# Patient Record
Sex: MALE | Race: WHITE | NOT HISPANIC OR LATINO | Employment: OTHER | URBAN - METROPOLITAN AREA
[De-identification: names, ages, dates, MRNs, and addresses within clinical notes are randomized per-mention and may not be internally consistent; named-entity substitution may affect disease eponyms.]

---

## 2017-01-01 ENCOUNTER — APPOINTMENT (OUTPATIENT)
Dept: NUCLEAR MEDICINE | Facility: HOSPITAL | Age: 77
End: 2017-01-01

## 2017-01-01 ENCOUNTER — HOSPITAL ENCOUNTER (INPATIENT)
Facility: HOSPITAL | Age: 77
LOS: 6 days | End: 2017-07-28
Attending: INTERNAL MEDICINE | Admitting: INTERNAL MEDICINE

## 2017-01-01 ENCOUNTER — HOSPITAL ENCOUNTER (OUTPATIENT)
Dept: INFUSION THERAPY | Facility: HOSPITAL | Age: 77
Discharge: HOME | End: 2017-03-14
Attending: INTERNAL MEDICINE | Admitting: PHYSICAL MEDICINE & REHABILITATION

## 2017-01-01 ENCOUNTER — INFUSION (OUTPATIENT)
Dept: ONCOLOGY | Facility: HOSPITAL | Age: 77
End: 2017-01-01

## 2017-01-01 ENCOUNTER — APPOINTMENT (OUTPATIENT)
Dept: CT IMAGING | Facility: HOSPITAL | Age: 77
End: 2017-01-01

## 2017-01-01 ENCOUNTER — ANESTHESIA EVENT (OUTPATIENT)
Dept: GASTROENTEROLOGY | Facility: HOSPITAL | Age: 77
End: 2017-01-01

## 2017-01-01 ENCOUNTER — TRANSCRIBE ORDERS (OUTPATIENT)
Dept: ADMINISTRATIVE | Facility: HOSPITAL | Age: 77
End: 2017-01-01

## 2017-01-01 ENCOUNTER — APPOINTMENT (OUTPATIENT)
Dept: RESPIRATORY THERAPY | Facility: HOSPITAL | Age: 77
End: 2017-01-01
Attending: INTERNAL MEDICINE

## 2017-01-01 ENCOUNTER — APPOINTMENT (OUTPATIENT)
Dept: ONCOLOGY | Facility: HOSPITAL | Age: 77
End: 2017-01-01

## 2017-01-01 ENCOUNTER — APPOINTMENT (OUTPATIENT)
Dept: MRI IMAGING | Facility: HOSPITAL | Age: 77
End: 2017-01-01
Attending: INTERNAL MEDICINE

## 2017-01-01 ENCOUNTER — APPOINTMENT (OUTPATIENT)
Dept: ONCOLOGY | Facility: CLINIC | Age: 77
End: 2017-01-01

## 2017-01-01 ENCOUNTER — INPATIENT HOSPITAL (OUTPATIENT)
Dept: URBAN - METROPOLITAN AREA HOSPITAL 113 | Facility: HOSPITAL | Age: 77
End: 2017-01-01

## 2017-01-01 ENCOUNTER — LAB (OUTPATIENT)
Dept: LAB | Facility: HOSPITAL | Age: 77
End: 2017-01-01

## 2017-01-01 ENCOUNTER — APPOINTMENT (OUTPATIENT)
Dept: CARDIOLOGY | Facility: HOSPITAL | Age: 77
End: 2017-01-01
Attending: INTERNAL MEDICINE

## 2017-01-01 ENCOUNTER — APPOINTMENT (OUTPATIENT)
Dept: GENERAL RADIOLOGY | Facility: HOSPITAL | Age: 77
End: 2017-01-01
Attending: INTERNAL MEDICINE

## 2017-01-01 ENCOUNTER — APPOINTMENT (OUTPATIENT)
Dept: NEUROLOGY | Facility: HOSPITAL | Age: 77
End: 2017-01-01
Attending: INTERNAL MEDICINE

## 2017-01-01 ENCOUNTER — TELEPHONE (OUTPATIENT)
Dept: ONCOLOGY | Facility: CLINIC | Age: 77
End: 2017-01-01

## 2017-01-01 ENCOUNTER — OFFICE VISIT (OUTPATIENT)
Dept: ONCOLOGY | Facility: CLINIC | Age: 77
End: 2017-01-01

## 2017-01-01 ENCOUNTER — APPOINTMENT (OUTPATIENT)
Dept: ULTRASOUND IMAGING | Facility: HOSPITAL | Age: 77
End: 2017-01-01

## 2017-01-01 ENCOUNTER — HOSPITAL ENCOUNTER (OUTPATIENT)
Dept: CARDIOLOGY | Facility: HOSPITAL | Age: 77
Discharge: HOME | End: 2017-04-28
Attending: INTERNAL MEDICINE | Admitting: INTERNAL MEDICINE

## 2017-01-01 ENCOUNTER — APPOINTMENT (OUTPATIENT)
Dept: LAB | Facility: HOSPITAL | Age: 77
End: 2017-01-01

## 2017-01-01 ENCOUNTER — ANESTHESIA (OUTPATIENT)
Dept: GASTROENTEROLOGY | Facility: HOSPITAL | Age: 77
End: 2017-01-01

## 2017-01-01 ENCOUNTER — HOSPITAL ENCOUNTER (INPATIENT)
Facility: HOSPITAL | Age: 77
LOS: 8 days | Discharge: NURSING, REHAB OR LONG-TERM CARE | End: 2017-07-11
Attending: EMERGENCY MEDICINE | Admitting: INTERNAL MEDICINE

## 2017-01-01 ENCOUNTER — HOSPITAL ENCOUNTER (OUTPATIENT)
Dept: PET IMAGING | Facility: HOSPITAL | Age: 77
Discharge: HOME | End: 2017-03-29
Attending: INTERNAL MEDICINE | Admitting: INTERNAL MEDICINE

## 2017-01-01 VITALS
RESPIRATION RATE: 17 BRPM | HEIGHT: 69 IN | TEMPERATURE: 97.5 F | DIASTOLIC BLOOD PRESSURE: 62 MMHG | OXYGEN SATURATION: 93 % | BODY MASS INDEX: 23.52 KG/M2 | HEART RATE: 88 BPM | SYSTOLIC BLOOD PRESSURE: 112 MMHG | WEIGHT: 158.8 LBS

## 2017-01-01 VITALS
DIASTOLIC BLOOD PRESSURE: 41 MMHG | OXYGEN SATURATION: 93 % | HEIGHT: 68 IN | BODY MASS INDEX: 24.32 KG/M2 | WEIGHT: 160.5 LBS | SYSTOLIC BLOOD PRESSURE: 68 MMHG | TEMPERATURE: 97 F

## 2017-01-01 VITALS
BODY MASS INDEX: 25.33 KG/M2 | HEART RATE: 73 BPM | HEIGHT: 69 IN | OXYGEN SATURATION: 95 % | DIASTOLIC BLOOD PRESSURE: 68 MMHG | WEIGHT: 171 LBS | RESPIRATION RATE: 12 BRPM | TEMPERATURE: 98.2 F | SYSTOLIC BLOOD PRESSURE: 128 MMHG

## 2017-01-01 VITALS — WEIGHT: 163.6 LBS | BODY MASS INDEX: 24.16 KG/M2

## 2017-01-01 VITALS — SYSTOLIC BLOOD PRESSURE: 137 MMHG | HEART RATE: 60 BPM | DIASTOLIC BLOOD PRESSURE: 72 MMHG

## 2017-01-01 VITALS
BODY MASS INDEX: 24.5 KG/M2 | RESPIRATION RATE: 14 BRPM | OXYGEN SATURATION: 95 % | HEART RATE: 58 BPM | TEMPERATURE: 98 F | SYSTOLIC BLOOD PRESSURE: 112 MMHG | HEIGHT: 69 IN | WEIGHT: 165.4 LBS | DIASTOLIC BLOOD PRESSURE: 62 MMHG

## 2017-01-01 VITALS
TEMPERATURE: 97.6 F | OXYGEN SATURATION: 96 % | DIASTOLIC BLOOD PRESSURE: 76 MMHG | HEART RATE: 76 BPM | RESPIRATION RATE: 18 BRPM | HEIGHT: 69 IN | WEIGHT: 165.6 LBS | SYSTOLIC BLOOD PRESSURE: 144 MMHG | BODY MASS INDEX: 24.53 KG/M2

## 2017-01-01 VITALS
HEIGHT: 69 IN | DIASTOLIC BLOOD PRESSURE: 72 MMHG | OXYGEN SATURATION: 98 % | SYSTOLIC BLOOD PRESSURE: 110 MMHG | RESPIRATION RATE: 16 BRPM | WEIGHT: 168.8 LBS | HEART RATE: 62 BPM | BODY MASS INDEX: 25 KG/M2 | TEMPERATURE: 98 F

## 2017-01-01 VITALS — DIASTOLIC BLOOD PRESSURE: 84 MMHG | SYSTOLIC BLOOD PRESSURE: 168 MMHG

## 2017-01-01 DIAGNOSIS — D50.9 MICROCYTIC HYPOCHROMIC ANEMIA: Primary | ICD-10-CM

## 2017-01-01 DIAGNOSIS — D50.9 MICROCYTIC HYPOCHROMIC ANEMIA: ICD-10-CM

## 2017-01-01 DIAGNOSIS — D51.8 OTHER VITAMIN B12 DEFICIENCY ANEMIA: ICD-10-CM

## 2017-01-01 DIAGNOSIS — I73.9 PAD (PERIPHERAL ARTERY DISEASE) (HCC): Primary | ICD-10-CM

## 2017-01-01 DIAGNOSIS — D51.9 ANEMIA DUE TO VITAMIN B12 DEFICIENCY, UNSPECIFIED B12 DEFICIENCY TYPE: ICD-10-CM

## 2017-01-01 DIAGNOSIS — K26.9 DUODENAL ULCER, UNSPECIFIED AS ACUTE OR CHRONIC, WITHOUT HEM: ICD-10-CM

## 2017-01-01 DIAGNOSIS — D51.8 OTHER VITAMIN B12 DEFICIENCY ANEMIA: Primary | ICD-10-CM

## 2017-01-01 DIAGNOSIS — R53.1 GENERAL WEAKNESS: ICD-10-CM

## 2017-01-01 DIAGNOSIS — R53.1 WEAKNESS GENERALIZED: Primary | ICD-10-CM

## 2017-01-01 DIAGNOSIS — D50.9 IRON DEFICIENCY ANEMIA, UNSPECIFIED IRON DEFICIENCY ANEMIA TYPE: Primary | ICD-10-CM

## 2017-01-01 DIAGNOSIS — D50.9 IRON DEFICIENCY ANEMIA, UNSPECIFIED IRON DEFICIENCY ANEMIA TYPE: ICD-10-CM

## 2017-01-01 DIAGNOSIS — G60.8 PERIPHERAL SENSORY NEUROPATHY: ICD-10-CM

## 2017-01-01 DIAGNOSIS — D50.9 IRON DEFICIENCY ANEMIA, UNSPECIFIED: ICD-10-CM

## 2017-01-01 DIAGNOSIS — I85.00 ESOPHAGEAL VARICES WITHOUT BLEEDING: ICD-10-CM

## 2017-01-01 DIAGNOSIS — R10.9 UNSPECIFIED ABDOMINAL PAIN: ICD-10-CM

## 2017-01-01 DIAGNOSIS — I82.90 ACUTE VENOUS THROMBOSIS: Primary | ICD-10-CM

## 2017-01-01 DIAGNOSIS — C34.90 NON-SMALL CELL LUNG CANCER, UNSPECIFIED LATERALITY (HCC): Primary | ICD-10-CM

## 2017-01-01 DIAGNOSIS — D51.0 PERNICIOUS ANEMIA: ICD-10-CM

## 2017-01-01 DIAGNOSIS — R63.4 ABNORMAL WEIGHT LOSS: ICD-10-CM

## 2017-01-01 DIAGNOSIS — I81 PORTAL VEIN THROMBOSIS: ICD-10-CM

## 2017-01-01 DIAGNOSIS — R63.0 ANOREXIA: ICD-10-CM

## 2017-01-01 DIAGNOSIS — K29.70 GASTRITIS, UNSPECIFIED, WITHOUT BLEEDING: ICD-10-CM

## 2017-01-01 DIAGNOSIS — C34.90 NON-SMALL CELL LUNG CANCER, UNSPECIFIED LATERALITY (HCC): ICD-10-CM

## 2017-01-01 DIAGNOSIS — I82.0 HEPATIC VEIN THROMBOSIS (HCC): ICD-10-CM

## 2017-01-01 DIAGNOSIS — G60.8 PERIPHERAL SENSORY NEUROPATHY: Primary | ICD-10-CM

## 2017-01-01 DIAGNOSIS — R10.84 GENERALIZED ABDOMINAL PAIN: ICD-10-CM

## 2017-01-01 DIAGNOSIS — I86.4 GASTRIC VARICES: ICD-10-CM

## 2017-01-01 DIAGNOSIS — D68.51 ACTIVATED PROTEIN C RESISTANCE: ICD-10-CM

## 2017-01-01 DIAGNOSIS — K55.9 VASCULAR DISORDER OF INTESTINE, UNSPECIFIED: ICD-10-CM

## 2017-01-01 DIAGNOSIS — I73.9 PAD (PERIPHERAL ARTERY DISEASE) (HCC): ICD-10-CM

## 2017-01-01 LAB
AFP-TM SERPL-MCNC: 1.2 NG/ML (ref 0–8.3)
ALBUMIN SERPL-MCNC: 2.2 G/DL (ref 3.5–5.2)
ALBUMIN SERPL-MCNC: 2.3 G/DL (ref 3.5–5.2)
ALBUMIN SERPL-MCNC: 2.3 G/DL (ref 3.5–5.2)
ALBUMIN SERPL-MCNC: 2.4 G/DL (ref 3.5–5.2)
ALBUMIN SERPL-MCNC: 2.5 G/DL (ref 2.9–4.4)
ALBUMIN SERPL-MCNC: 2.7 G/DL (ref 3.5–5.2)
ALBUMIN SERPL-MCNC: 2.9 G/DL (ref 3.5–5.2)
ALBUMIN SERPL-MCNC: 3 G/DL (ref 3.5–5.2)
ALBUMIN SERPL-MCNC: 3 G/DL (ref 3.5–5.2)
ALBUMIN/GLOB SERPL: 0.6 G/DL
ALBUMIN/GLOB SERPL: 0.6 G/DL
ALBUMIN/GLOB SERPL: 0.7 G/DL
ALBUMIN/GLOB SERPL: 0.8 G/DL
ALBUMIN/GLOB SERPL: 0.8 G/DL
ALBUMIN/GLOB SERPL: 0.8 {RATIO} (ref 0.7–1.7)
ALBUMIN/GLOB SERPL: 0.9 G/DL
ALBUMIN/GLOB SERPL: 0.9 G/DL
ALP SERPL-CCNC: 111 U/L (ref 39–117)
ALP SERPL-CCNC: 122 U/L (ref 39–117)
ALP SERPL-CCNC: 126 U/L (ref 39–117)
ALP SERPL-CCNC: 129 U/L (ref 39–117)
ALP SERPL-CCNC: 142 U/L (ref 39–117)
ALP SERPL-CCNC: 153 U/L (ref 39–117)
ALP SERPL-CCNC: 158 U/L (ref 39–117)
ALP SERPL-CCNC: 171 U/L (ref 39–117)
ALP SERPL-CCNC: 184 U/L (ref 39–117)
ALP SERPL-CCNC: 201 U/L (ref 39–117)
ALPHA1 GLOB FLD ELPH-MCNC: 0.4 G/DL (ref 0–0.4)
ALPHA2 GLOB SERPL ELPH-MCNC: 1 G/DL (ref 0.4–1)
ALT SERPL W P-5'-P-CCNC: 18 U/L (ref 1–41)
ALT SERPL W P-5'-P-CCNC: 19 U/L (ref 1–41)
ALT SERPL W P-5'-P-CCNC: 19 U/L (ref 1–41)
ALT SERPL W P-5'-P-CCNC: 20 U/L (ref 1–41)
ALT SERPL W P-5'-P-CCNC: 20 U/L (ref 1–41)
ALT SERPL W P-5'-P-CCNC: 24 U/L (ref 1–41)
ALT SERPL W P-5'-P-CCNC: 26 U/L (ref 1–41)
ALT SERPL W P-5'-P-CCNC: 34 U/L (ref 1–41)
ALT SERPL W P-5'-P-CCNC: 47 U/L (ref 1–41)
ALT SERPL W P-5'-P-CCNC: 60 U/L (ref 1–41)
AMMONIA BLD-SCNC: 37 UMOL/L (ref 16–60)
AMMONIA BLD-SCNC: 46 UMOL/L (ref 16–60)
ANION GAP SERPL CALCULATED.3IONS-SCNC: 10.6 MMOL/L
ANION GAP SERPL CALCULATED.3IONS-SCNC: 12.1 MMOL/L
ANION GAP SERPL CALCULATED.3IONS-SCNC: 12.3 MMOL/L
ANION GAP SERPL CALCULATED.3IONS-SCNC: 12.5 MMOL/L
ANION GAP SERPL CALCULATED.3IONS-SCNC: 12.5 MMOL/L
ANION GAP SERPL CALCULATED.3IONS-SCNC: 13 MMOL/L
ANION GAP SERPL CALCULATED.3IONS-SCNC: 13.4 MMOL/L
ANION GAP SERPL CALCULATED.3IONS-SCNC: 13.7 MMOL/L
ANION GAP SERPL CALCULATED.3IONS-SCNC: 13.9 MMOL/L
ANION GAP SERPL CALCULATED.3IONS-SCNC: 14.3 MMOL/L
ANION GAP SERPL CALCULATED.3IONS-SCNC: 14.5 MMOL/L
ANION GAP SERPL CALCULATED.3IONS-SCNC: 15 MMOL/L
ANION GAP SERPL CALCULATED.3IONS-SCNC: 16.5 MMOL/L
ANISOCYTOSIS BLD QL: NORMAL
APPEARANCE FLD: CLEAR
APPEARANCE FLD: CLEAR
APTT HEX PL PPP: 8 SEC
APTT IMM NP PPP: 28.7 SEC
APTT PPP 1:1 SALINE: 51.1 SEC
APTT PPP: 101.2 SECONDS (ref 22.7–35.4)
APTT PPP: 105.5 SECONDS (ref 22.7–35.4)
APTT PPP: 116.8 SECONDS (ref 22.7–35.4)
APTT PPP: 127.7 SECONDS (ref 22.7–35.4)
APTT PPP: 135.8 SECONDS (ref 22.7–35.4)
APTT PPP: 143.1 SECONDS (ref 22.7–35.4)
APTT PPP: 38.8 SECONDS (ref 22.7–35.4)
APTT PPP: 39.8 SECONDS (ref 22.7–35.4)
APTT PPP: 40.9 SECONDS (ref 22.7–35.4)
APTT PPP: 41.1 SECONDS (ref 22.7–35.4)
APTT PPP: 51.5 SEC
APTT PPP: 58.8 SECONDS (ref 22.7–35.4)
APTT PPP: 59.6 SECONDS (ref 22.7–35.4)
APTT PPP: 64.3 SECONDS (ref 22.7–35.4)
APTT PPP: 64.5 SECONDS (ref 22.7–35.4)
APTT PPP: 65.4 SECONDS (ref 22.7–35.4)
APTT PPP: 66.9 SECONDS (ref 22.7–35.4)
APTT PPP: 67.4 SECONDS (ref 22.7–35.4)
APTT PPP: 69.5 SECONDS (ref 22.7–35.4)
APTT PPP: 71.8 SECONDS (ref 22.7–35.4)
APTT PPP: 75 SECONDS (ref 22.7–35.4)
APTT PPP: 77.9 SECONDS (ref 22.7–35.4)
APTT PPP: 78.4 SECONDS (ref 22.7–35.4)
APTT PPP: 79.1 SECONDS (ref 22.7–35.4)
APTT PPP: 79.2 SECONDS (ref 22.7–35.4)
APTT PPP: 80.1 SECONDS (ref 22.7–35.4)
APTT PPP: 82.2 SECONDS (ref 22.7–35.4)
APTT PPP: 86.1 SECONDS (ref 22.7–35.4)
APTT PPP: 94.6 SECONDS (ref 22.7–35.4)
APTT PPP: 95.6 SECONDS (ref 22.7–35.4)
AST SERPL-CCNC: 26 U/L (ref 1–40)
AST SERPL-CCNC: 34 U/L (ref 1–40)
AST SERPL-CCNC: 34 U/L (ref 1–40)
AST SERPL-CCNC: 35 U/L (ref 1–40)
AST SERPL-CCNC: 36 U/L (ref 1–40)
AST SERPL-CCNC: 40 U/L (ref 1–40)
AST SERPL-CCNC: 40 U/L (ref 1–40)
AST SERPL-CCNC: 46 U/L (ref 1–40)
AST SERPL-CCNC: 57 U/L (ref 1–40)
AST SERPL-CCNC: 67 U/L (ref 1–40)
B-GLOBULIN SERPL ELPH-MCNC: 0.6 G/DL (ref 0.7–1.3)
B2 GLYCOPROT1 IGA SER-ACNC: <10 SAU
B2 GLYCOPROT1 IGA SER-ACNC: <9 GPI IGA UNITS (ref 0–25)
B2 GLYCOPROT1 IGG SER-ACNC: <10 SGU
B2 GLYCOPROT1 IGG SER-ACNC: <9 GPI IGG UNITS (ref 0–20)
B2 GLYCOPROT1 IGM SER-ACNC: <10 SMU
B2 GLYCOPROT1 IGM SER-ACNC: <9 GPI IGM UNITS (ref 0–32)
BACTERIA FLD CULT: NORMAL
BACTERIA SPEC AEROBE CULT: NORMAL
BACTERIA SPEC AEROBE CULT: NORMAL
BACTERIA UR QL AUTO: ABNORMAL /HPF
BACTERIA UR QL AUTO: ABNORMAL /HPF
BASOPHILS # BLD AUTO: 0.05 10*3/MM3 (ref 0–0.2)
BASOPHILS # BLD AUTO: 0.07 10*3/MM3 (ref 0–0.2)
BASOPHILS # BLD AUTO: 0.08 10*3/MM3 (ref 0–0.2)
BASOPHILS # BLD AUTO: 0.08 10*3/MM3 (ref 0–0.2)
BASOPHILS # BLD AUTO: 0.09 10*3/MM3 (ref 0–0.2)
BASOPHILS # BLD AUTO: 0.1 10*3/MM3 (ref 0–0.2)
BASOPHILS # BLD AUTO: 0.11 10*3/MM3 (ref 0–0.2)
BASOPHILS # BLD AUTO: 0.12 10*3/MM3 (ref 0–0.2)
BASOPHILS # BLD AUTO: 0.13 10*3/MM3 (ref 0–0.2)
BASOPHILS # BLD AUTO: 0.14 10*3/MM3 (ref 0–0.2)
BASOPHILS # BLD AUTO: 0.15 10*3/MM3 (ref 0–0.2)
BASOPHILS # BLD AUTO: 0.16 10*3/MM3 (ref 0–0.2)
BASOPHILS # BLD AUTO: 0.17 10*3/MM3 (ref 0–0.1)
BASOPHILS # BLD AUTO: 0.19 10*3/MM3 (ref 0–0.1)
BASOPHILS # BLD AUTO: 0.2 10*3/MM3 (ref 0–0.1)
BASOPHILS # BLD AUTO: 0.21 10*3/MM3 (ref 0–0.1)
BASOPHILS NFR BLD AUTO: 0.4 % (ref 0–1.5)
BASOPHILS NFR BLD AUTO: 0.5 % (ref 0–1.5)
BASOPHILS NFR BLD AUTO: 0.5 % (ref 0–1.5)
BASOPHILS NFR BLD AUTO: 0.6 % (ref 0–1.5)
BASOPHILS NFR BLD AUTO: 0.7 % (ref 0–1.5)
BASOPHILS NFR BLD AUTO: 0.8 % (ref 0–1.5)
BASOPHILS NFR BLD AUTO: 1.1 % (ref 0–1.1)
BASOPHILS NFR BLD AUTO: 1.2 % (ref 0–1.1)
BASOPHILS NFR BLD AUTO: 1.4 % (ref 0–1.1)
BASOPHILS NFR BLD AUTO: 1.5 % (ref 0–1.1)
BH CV ECHO MEAS - ACS: 1.5 CM
BH CV ECHO MEAS - AO MAX PG: 9 MMHG
BH CV ECHO MEAS - AO MEAN PG (FULL): 3 MMHG
BH CV ECHO MEAS - AO MEAN PG: 5 MMHG
BH CV ECHO MEAS - AO ROOT AREA (BSA CORRECTED): 1.8
BH CV ECHO MEAS - AO ROOT AREA: 9.1 CM^2
BH CV ECHO MEAS - AO ROOT DIAM: 3.4 CM
BH CV ECHO MEAS - AO V2 MAX: 149 CM/SEC
BH CV ECHO MEAS - AO V2 MEAN: 105 CM/SEC
BH CV ECHO MEAS - AO V2 VTI: 36 CM
BH CV ECHO MEAS - AVA(I,A): 2.2 CM^2
BH CV ECHO MEAS - AVA(I,D): 2.2 CM^2
BH CV ECHO MEAS - BSA(HAYCOCK): 1.9 M^2
BH CV ECHO MEAS - BSA: 1.9 M^2
BH CV ECHO MEAS - BZI_BMI: 23.3 KILOGRAMS/M^2
BH CV ECHO MEAS - BZI_METRIC_HEIGHT: 175.3 CM
BH CV ECHO MEAS - BZI_METRIC_WEIGHT: 71.7 KG
BH CV ECHO MEAS - CONTRAST EF (2CH): 57.9 ML/M^2
BH CV ECHO MEAS - CONTRAST EF 4CH: 48.9 ML/M^2
BH CV ECHO MEAS - EDV(CUBED): 85.2 ML
BH CV ECHO MEAS - EDV(MOD-SP2): 95 ML
BH CV ECHO MEAS - EDV(MOD-SP4): 139 ML
BH CV ECHO MEAS - EDV(TEICH): 87.7 ML
BH CV ECHO MEAS - EF(CUBED): 57.8 %
BH CV ECHO MEAS - EF(MOD-SP2): 57.9 %
BH CV ECHO MEAS - EF(MOD-SP4): 48.9 %
BH CV ECHO MEAS - EF(TEICH): 49.7 %
BH CV ECHO MEAS - ESV(CUBED): 35.9 ML
BH CV ECHO MEAS - ESV(MOD-SP2): 40 ML
BH CV ECHO MEAS - ESV(MOD-SP4): 71 ML
BH CV ECHO MEAS - ESV(TEICH): 44.1 ML
BH CV ECHO MEAS - FS: 25 %
BH CV ECHO MEAS - IVS/LVPW: 1
BH CV ECHO MEAS - IVSD: 1.1 CM
BH CV ECHO MEAS - LA DIMENSION(2D): 18 CM
BH CV ECHO MEAS - LA DIMENSION: 6 CM
BH CV ECHO MEAS - LAT PEAK E' VEL: 9 CM/SEC
BH CV ECHO MEAS - LV DIASTOLIC VOL/BSA (35-75): 74.4 ML/M^2
BH CV ECHO MEAS - LV MASS(C)D: 168.9 GRAMS
BH CV ECHO MEAS - LV MASS(C)DI: 90.4 GRAMS/M^2
BH CV ECHO MEAS - LV MEAN PG: 2 MMHG
BH CV ECHO MEAS - LV SYSTOLIC VOL/BSA (12-30): 38 ML/M^2
BH CV ECHO MEAS - LV V1 MEAN: 68.6 CM/SEC
BH CV ECHO MEAS - LV V1 VTI: 25.3 CM
BH CV ECHO MEAS - LVIDD: 4.4 CM
BH CV ECHO MEAS - LVIDS: 3.3 CM
BH CV ECHO MEAS - LVLD AP2: 7.7 CM
BH CV ECHO MEAS - LVLD AP4: 8.4 CM
BH CV ECHO MEAS - LVLS AP2: 6.3 CM
BH CV ECHO MEAS - LVLS AP4: 7.6 CM
BH CV ECHO MEAS - LVOT AREA (M): 3.1 CM^2
BH CV ECHO MEAS - LVOT AREA: 3.1 CM^2
BH CV ECHO MEAS - LVOT DIAM: 2 CM
BH CV ECHO MEAS - LVPWD: 1.1 CM
BH CV ECHO MEAS - MED PEAK E' VEL: 7 CM/SEC
BH CV ECHO MEAS - MV A DUR: 0.13 SEC
BH CV ECHO MEAS - MV A MAX VEL: 87.5 CM/SEC
BH CV ECHO MEAS - MV DEC SLOPE: 387 CM/SEC^2
BH CV ECHO MEAS - MV DEC TIME: 0.28 SEC
BH CV ECHO MEAS - MV E MAX VEL: 74 CM/SEC
BH CV ECHO MEAS - MV E/A: 0.85
BH CV ECHO MEAS - MV MEAN PG: 1 MMHG
BH CV ECHO MEAS - MV P1/2T MAX VEL: 106 CM/SEC
BH CV ECHO MEAS - MV P1/2T: 80.2 MSEC
BH CV ECHO MEAS - MV V2 MEAN: 52.6 CM/SEC
BH CV ECHO MEAS - MV V2 VTI: 41.3 CM
BH CV ECHO MEAS - MVA P1/2T LCG: 2.1 CM^2
BH CV ECHO MEAS - MVA(P1/2T): 2.7 CM^2
BH CV ECHO MEAS - MVA(VTI): 1.9 CM^2
BH CV ECHO MEAS - PA ACC SLOPE: 883 CM/SEC^2
BH CV ECHO MEAS - PA ACC TIME: 0.11 SEC
BH CV ECHO MEAS - PA MAX PG (FULL): 2.4 MMHG
BH CV ECHO MEAS - PA MAX PG: 3.7 MMHG
BH CV ECHO MEAS - PA PR(ACCEL): 30 MMHG
BH CV ECHO MEAS - PA V2 MAX: 95.7 CM/SEC
BH CV ECHO MEAS - PULM A REVS DUR: 0.12 SEC
BH CV ECHO MEAS - PULM A REVS VEL: 30.5 CM/SEC
BH CV ECHO MEAS - PULM DIAS VEL: 52.4 CM/SEC
BH CV ECHO MEAS - PULM S/D: 1.3
BH CV ECHO MEAS - PULM SYS VEL: 69 CM/SEC
BH CV ECHO MEAS - PVA(V,A): 1.5 CM^2
BH CV ECHO MEAS - PVA(V,D): 1.5 CM^2
BH CV ECHO MEAS - QP/QS: 0.38
BH CV ECHO MEAS - RV MAX PG: 1.3 MMHG
BH CV ECHO MEAS - RV MEAN PG: 1 MMHG
BH CV ECHO MEAS - RV V1 MAX: 57.1 CM/SEC
BH CV ECHO MEAS - RV V1 MEAN: 34.4 CM/SEC
BH CV ECHO MEAS - RV V1 VTI: 12 CM
BH CV ECHO MEAS - RVOT AREA: 2.5 CM^2
BH CV ECHO MEAS - RVOT DIAM: 1.8 CM
BH CV ECHO MEAS - SI(AO): 174.9 ML/M^2
BH CV ECHO MEAS - SI(CUBED): 26.4 ML/M^2
BH CV ECHO MEAS - SI(LVOT): 42.5 ML/M^2
BH CV ECHO MEAS - SI(MOD-SP2): 29.4 ML/M^2
BH CV ECHO MEAS - SI(MOD-SP4): 36.4 ML/M^2
BH CV ECHO MEAS - SI(TEICH): 23.3 ML/M^2
BH CV ECHO MEAS - SV(AO): 326.9 ML
BH CV ECHO MEAS - SV(CUBED): 49.2 ML
BH CV ECHO MEAS - SV(LVOT): 79.5 ML
BH CV ECHO MEAS - SV(MOD-SP2): 55 ML
BH CV ECHO MEAS - SV(MOD-SP4): 68 ML
BH CV ECHO MEAS - SV(RVOT): 30.5 ML
BH CV ECHO MEAS - SV(TEICH): 43.6 ML
BH CV ECHO MEAS - TAPSE (>1.6): 2.4 CM2
BH CV ECHO MEAS - TR MAX VEL: 222 CM/SEC
BH CV LOWER ARTERIAL LEFT ABI RATIO: 1.05
BH CV LOWER ARTERIAL LEFT DORSALIS PEDIS SYS MAX: 176 MMHG
BH CV LOWER ARTERIAL LEFT GREAT TOE SYS MAX: 162 MMHG
BH CV LOWER ARTERIAL LEFT HIGH THIGH SYS MAX: 201 MMHG
BH CV LOWER ARTERIAL LEFT LOW THIGH SYS MAX: 180 MMHG
BH CV LOWER ARTERIAL LEFT POPLITEAL SYS MAX: 175 MMHG
BH CV LOWER ARTERIAL LEFT POST TIBIAL SYS MAX: 175 MMHG
BH CV LOWER ARTERIAL LEFT TBI RATIO: 0.96
BH CV LOWER ARTERIAL RIGHT ABI RATIO: 0.69
BH CV LOWER ARTERIAL RIGHT DORSALIS PEDIS SYS MAX: 100 MMHG
BH CV LOWER ARTERIAL RIGHT GREAT TOE SYS MAX: 47 MMHG
BH CV LOWER ARTERIAL RIGHT HIGH THIGH SYS MAX: 150 MMHG
BH CV LOWER ARTERIAL RIGHT LOW THIGH SYS MAX: 164 MMHG
BH CV LOWER ARTERIAL RIGHT POPLITEAL SYS MAX: 120 MMHG
BH CV LOWER ARTERIAL RIGHT POST TIBIAL SYS MAX: 116 MMHG
BH CV LOWER ARTERIAL RIGHT TBI RATIO: 0.28
BH CV LOWER VASCULAR LEFT COMMON FEMORAL AUGMENT: NORMAL
BH CV LOWER VASCULAR LEFT COMMON FEMORAL COMPETENT: NORMAL
BH CV LOWER VASCULAR LEFT COMMON FEMORAL COMPRESS: NORMAL
BH CV LOWER VASCULAR LEFT COMMON FEMORAL PHASIC: NORMAL
BH CV LOWER VASCULAR LEFT COMMON FEMORAL SPONT: NORMAL
BH CV LOWER VASCULAR LEFT DISTAL FEMORAL COMPRESS: NORMAL
BH CV LOWER VASCULAR LEFT GASTRONEMIUS COMPRESS: NORMAL
BH CV LOWER VASCULAR LEFT GREATER SAPH AK COMPRESS: NORMAL
BH CV LOWER VASCULAR LEFT GREATER SAPH BK COMPRESS: NORMAL
BH CV LOWER VASCULAR LEFT MID FEMORAL AUGMENT: NORMAL
BH CV LOWER VASCULAR LEFT MID FEMORAL COMPETENT: NORMAL
BH CV LOWER VASCULAR LEFT MID FEMORAL COMPRESS: NORMAL
BH CV LOWER VASCULAR LEFT MID FEMORAL PHASIC: NORMAL
BH CV LOWER VASCULAR LEFT MID FEMORAL SPONT: NORMAL
BH CV LOWER VASCULAR LEFT PERONEAL COMPRESS: NORMAL
BH CV LOWER VASCULAR LEFT POPLITEAL AUGMENT: NORMAL
BH CV LOWER VASCULAR LEFT POPLITEAL COMPETENT: NORMAL
BH CV LOWER VASCULAR LEFT POPLITEAL COMPRESS: NORMAL
BH CV LOWER VASCULAR LEFT POPLITEAL PHASIC: NORMAL
BH CV LOWER VASCULAR LEFT POPLITEAL SPONT: NORMAL
BH CV LOWER VASCULAR LEFT POSTERIOR TIBIAL COMPRESS: NORMAL
BH CV LOWER VASCULAR LEFT PROXIMAL FEMORAL COMPRESS: NORMAL
BH CV LOWER VASCULAR LEFT SAPHENOFEMORAL JUNCTION AUGMENT: NORMAL
BH CV LOWER VASCULAR LEFT SAPHENOFEMORAL JUNCTION COMPETENT: NORMAL
BH CV LOWER VASCULAR LEFT SAPHENOFEMORAL JUNCTION COMPRESS: NORMAL
BH CV LOWER VASCULAR LEFT SAPHENOFEMORAL JUNCTION PHASIC: NORMAL
BH CV LOWER VASCULAR LEFT SAPHENOFEMORAL JUNCTION SPONT: NORMAL
BH CV LOWER VASCULAR RIGHT COMMON FEMORAL AUGMENT: NORMAL
BH CV LOWER VASCULAR RIGHT COMMON FEMORAL COMPETENT: NORMAL
BH CV LOWER VASCULAR RIGHT COMMON FEMORAL COMPRESS: NORMAL
BH CV LOWER VASCULAR RIGHT COMMON FEMORAL PHASIC: NORMAL
BH CV LOWER VASCULAR RIGHT COMMON FEMORAL SPONT: NORMAL
BH CV LOWER VASCULAR RIGHT DISTAL FEMORAL COMPRESS: NORMAL
BH CV LOWER VASCULAR RIGHT GASTRONEMIUS COMPRESS: NORMAL
BH CV LOWER VASCULAR RIGHT GREATER SAPH AK COMPRESS: NORMAL
BH CV LOWER VASCULAR RIGHT GREATER SAPH BK COMPRESS: NORMAL
BH CV LOWER VASCULAR RIGHT MID FEMORAL AUGMENT: NORMAL
BH CV LOWER VASCULAR RIGHT MID FEMORAL COMPETENT: NORMAL
BH CV LOWER VASCULAR RIGHT MID FEMORAL COMPRESS: NORMAL
BH CV LOWER VASCULAR RIGHT MID FEMORAL PHASIC: NORMAL
BH CV LOWER VASCULAR RIGHT MID FEMORAL SPONT: NORMAL
BH CV LOWER VASCULAR RIGHT PERONEAL COMPRESS: NORMAL
BH CV LOWER VASCULAR RIGHT POPLITEAL AUGMENT: NORMAL
BH CV LOWER VASCULAR RIGHT POPLITEAL COMPETENT: NORMAL
BH CV LOWER VASCULAR RIGHT POPLITEAL COMPRESS: NORMAL
BH CV LOWER VASCULAR RIGHT POPLITEAL PHASIC: NORMAL
BH CV LOWER VASCULAR RIGHT POPLITEAL SPONT: NORMAL
BH CV LOWER VASCULAR RIGHT POSTERIOR TIBIAL COMPRESS: NORMAL
BH CV LOWER VASCULAR RIGHT PROXIMAL FEMORAL COMPRESS: NORMAL
BH CV LOWER VASCULAR RIGHT SAPHENOFEMORAL JUNCTION AUGMENT: NORMAL
BH CV LOWER VASCULAR RIGHT SAPHENOFEMORAL JUNCTION COMPETENT: NORMAL
BH CV LOWER VASCULAR RIGHT SAPHENOFEMORAL JUNCTION COMPRESS: NORMAL
BH CV LOWER VASCULAR RIGHT SAPHENOFEMORAL JUNCTION PHASIC: NORMAL
BH CV LOWER VASCULAR RIGHT SAPHENOFEMORAL JUNCTION SPONT: NORMAL
BH CV VAS BP RIGHT ARM: NORMAL MMHG
BH CV VAS HEPATIC - EXTRAHEPATIC: NORMAL
BH CV VAS HEPATIC - MAIN: NORMAL
BH CV VAS SMA 1ST PP TIME: 15 MIN
BH CV VAS SMA 2ND PP TIME: 30 MIN
BH CV VAS SMA 3RD PP TIME: 45 MIN
BH CV VAS SMA AORTA PSV: 74 CM/S
BH CV VAS SMA CELIAC DIST EDV: 15 CM/S
BH CV VAS SMA CELIAC DIST PSV: 129 CM/S
BH CV VAS SMA CELIAC MID EDV: 22 CM/S
BH CV VAS SMA CELIAC MID PSV: 123 CM/S
BH CV VAS SMA CELIAC ORIGIN EDV: 24 CM/S
BH CV VAS SMA CELIAC ORIGIN PSV: 180 CM/S
BH CV VAS SMA CELIAC PROX EDV: 20 CM/S
BH CV VAS SMA CELIAC PROX PSV: 153 CM/S
BH CV VAS SMA HEPATIC EDV: 36 CM/S
BH CV VAS SMA HEPATIC PSV: 113 CM/S
BH CV VAS SMA IMA PSV: 137 CM/S
BH CV VAS SMA ORIGIN EDV: 18 CM/S
BH CV VAS SMA ORIGIN PSV: 152 CM/S
BH CV VAS SMA SMA DIST PSV: 140 CM/S
BH CV VAS SMA SMA MID EDV: 20 CM/S
BH CV VAS SMA SMA MID PSV: 117 CM/S
BH CV VAS SMA SMA PROX EDV: 25 CM/S
BH CV VAS SMA SMA PROX PSV: 174 CM/S
BH CV VAS SMA SPLENIC EDV: 35 CM/S
BH CV VAS SMA SPLENIC PSV: 114 CM/S
BH CV XLRA - RV BASE: 4 CM
BH CV XLRA - TDI S': 14 CM/SEC
BILIRUB SERPL-MCNC: 0.3 MG/DL (ref 0.1–1.2)
BILIRUB SERPL-MCNC: 0.4 MG/DL (ref 0.1–1.2)
BILIRUB SERPL-MCNC: 0.4 MG/DL (ref 0.1–1.2)
BILIRUB SERPL-MCNC: 0.5 MG/DL (ref 0.1–1.2)
BILIRUB SERPL-MCNC: 0.5 MG/DL (ref 0.1–1.2)
BILIRUB SERPL-MCNC: 0.6 MG/DL (ref 0.1–1.2)
BILIRUB SERPL-MCNC: 0.7 MG/DL (ref 0.1–1.2)
BILIRUB SERPL-MCNC: 0.7 MG/DL (ref 0.1–1.2)
BILIRUB SERPL-MCNC: 0.9 MG/DL (ref 0.1–1.2)
BILIRUB SERPL-MCNC: 0.9 MG/DL (ref 0.1–1.2)
BILIRUB UR QL STRIP: NEGATIVE
BILIRUB UR QL STRIP: NEGATIVE
BUN BLD-MCNC: 17 MG/DL (ref 8–23)
BUN BLD-MCNC: 21 MG/DL (ref 8–23)
BUN BLD-MCNC: 24 MG/DL (ref 8–23)
BUN BLD-MCNC: 27 MG/DL (ref 8–23)
BUN BLD-MCNC: 27 MG/DL (ref 8–23)
BUN BLD-MCNC: 29 MG/DL (ref 8–23)
BUN BLD-MCNC: 30 MG/DL (ref 8–23)
BUN BLD-MCNC: 31 MG/DL (ref 8–23)
BUN BLD-MCNC: 32 MG/DL (ref 8–23)
BUN BLD-MCNC: 34 MG/DL (ref 8–23)
BUN BLD-MCNC: 34 MG/DL (ref 8–23)
BUN BLD-MCNC: 40 MG/DL (ref 8–23)
BUN BLD-MCNC: 42 MG/DL (ref 8–23)
BUN/CREAT SERPL: 11.6 (ref 7–25)
BUN/CREAT SERPL: 12.1 (ref 7–25)
BUN/CREAT SERPL: 12.5 (ref 7–25)
BUN/CREAT SERPL: 14.3 (ref 7–25)
BUN/CREAT SERPL: 14.9 (ref 7–25)
BUN/CREAT SERPL: 15.1 (ref 7–25)
BUN/CREAT SERPL: 15.7 (ref 7–25)
BUN/CREAT SERPL: 16.4 (ref 7–25)
BUN/CREAT SERPL: 18.6 (ref 7–25)
BUN/CREAT SERPL: 18.7 (ref 7–25)
BUN/CREAT SERPL: 20.3 (ref 7–25)
BUN/CREAT SERPL: 21.5 (ref 7–25)
BUN/CREAT SERPL: 22.8 (ref 7–25)
CALCIUM SPEC-SCNC: 8 MG/DL (ref 8.6–10.5)
CALCIUM SPEC-SCNC: 8.1 MG/DL (ref 8.6–10.5)
CALCIUM SPEC-SCNC: 8.3 MG/DL (ref 8.6–10.5)
CALCIUM SPEC-SCNC: 8.5 MG/DL (ref 8.6–10.5)
CALCIUM SPEC-SCNC: 8.6 MG/DL (ref 8.6–10.5)
CALCIUM SPEC-SCNC: 8.6 MG/DL (ref 8.6–10.5)
CALCIUM SPEC-SCNC: 8.9 MG/DL (ref 8.6–10.5)
CALCIUM SPEC-SCNC: 9.3 MG/DL (ref 8.6–10.5)
CALCIUM SPEC-SCNC: 9.4 MG/DL (ref 8.6–10.5)
CANCER AG19-9 SERPL-ACNC: 16 U/ML (ref 0–35)
CARDIOLIPIN IGA SER IA-ACNC: <9 APL U/ML (ref 0–11)
CARDIOLIPIN IGA SER IA-ACNC: <9 APL U/ML (ref 0–11)
CARDIOLIPIN IGG SER IA-ACNC: <10 GPL
CARDIOLIPIN IGG SER IA-ACNC: <9 GPL U/ML (ref 0–14)
CARDIOLIPIN IGM SER IA-ACNC: <10 MPL
CARDIOLIPIN IGM SER IA-ACNC: <9 MPL U/ML (ref 0–12)
CEA SERPL-MCNC: 1.34 NG/ML
CENTROMERE B AB SER-ACNC: <0.2 AI (ref 0–0.9)
CHLORIDE SERPL-SCNC: 100 MMOL/L (ref 98–107)
CHLORIDE SERPL-SCNC: 101 MMOL/L (ref 98–107)
CHLORIDE SERPL-SCNC: 101 MMOL/L (ref 98–107)
CHLORIDE SERPL-SCNC: 102 MMOL/L (ref 98–107)
CHLORIDE SERPL-SCNC: 103 MMOL/L (ref 98–107)
CHLORIDE SERPL-SCNC: 103 MMOL/L (ref 98–107)
CHLORIDE SERPL-SCNC: 104 MMOL/L (ref 98–107)
CHLORIDE SERPL-SCNC: 105 MMOL/L (ref 98–107)
CHLORIDE SERPL-SCNC: 96 MMOL/L (ref 98–107)
CHLORIDE SERPL-SCNC: 99 MMOL/L (ref 98–107)
CHROMATIN AB SERPL-ACNC: <0.2 AI (ref 0–0.9)
CLARITY UR: ABNORMAL
CLARITY UR: ABNORMAL
CLUMPED PLATELETS: PRESENT
CO2 SERPL-SCNC: 18.5 MMOL/L (ref 22–29)
CO2 SERPL-SCNC: 21.3 MMOL/L (ref 22–29)
CO2 SERPL-SCNC: 21.9 MMOL/L (ref 22–29)
CO2 SERPL-SCNC: 22.6 MMOL/L (ref 22–29)
CO2 SERPL-SCNC: 22.7 MMOL/L (ref 22–29)
CO2 SERPL-SCNC: 23 MMOL/L (ref 22–29)
CO2 SERPL-SCNC: 23.1 MMOL/L (ref 22–29)
CO2 SERPL-SCNC: 23.5 MMOL/L (ref 22–29)
CO2 SERPL-SCNC: 23.5 MMOL/L (ref 22–29)
CO2 SERPL-SCNC: 23.7 MMOL/L (ref 22–29)
CO2 SERPL-SCNC: 24.4 MMOL/L (ref 22–29)
CO2 SERPL-SCNC: 25 MMOL/L (ref 22–29)
CO2 SERPL-SCNC: 25.5 MMOL/L (ref 22–29)
COLOR FLD: YELLOW
COLOR FLD: YELLOW
COLOR UR: ABNORMAL
COLOR UR: ABNORMAL
CONFIRM DRVVT: 66.5 SEC
CREAT BLD-MCNC: 1.28 MG/DL (ref 0.76–1.27)
CREAT BLD-MCNC: 1.33 MG/DL (ref 0.76–1.27)
CREAT BLD-MCNC: 1.44 MG/DL (ref 0.76–1.27)
CREAT BLD-MCNC: 1.47 MG/DL (ref 0.76–1.27)
CREAT BLD-MCNC: 1.53 MG/DL (ref 0.76–1.27)
CREAT BLD-MCNC: 1.55 MG/DL (ref 0.76–1.27)
CREAT BLD-MCNC: 1.81 MG/DL (ref 0.76–1.27)
CREAT BLD-MCNC: 1.83 MG/DL (ref 0.76–1.27)
CREAT BLD-MCNC: 1.84 MG/DL (ref 0.76–1.27)
CREAT BLD-MCNC: 1.99 MG/DL (ref 0.76–1.27)
CREAT BLD-MCNC: 2.24 MG/DL (ref 0.76–1.27)
CREAT BLD-MCNC: 2.81 MG/DL (ref 0.76–1.27)
CREAT BLD-MCNC: 3.19 MG/DL (ref 0.76–1.27)
CREAT UR-MCNC: 340.8 MG/DL
CRP SERPL-MCNC: 10.73 MG/DL (ref 0–0.5)
CRP SERPL-MCNC: 2.76 MG/DL (ref 0–0.5)
CYTO UR: NORMAL
CYTO UR: NORMAL
D-LACTATE SERPL-SCNC: 1.3 MMOL/L (ref 0.5–2)
D-LACTATE SERPL-SCNC: 1.5 MMOL/L (ref 0.5–2)
D-LACTATE SERPL-SCNC: 2.1 MMOL/L (ref 0.5–2)
DACRYOCYTES BLD QL SMEAR: NORMAL
DEPRECATED RDW RBC AUTO: 45.9 FL (ref 37–54)
DEPRECATED RDW RBC AUTO: 46.2 FL (ref 37–54)
DEPRECATED RDW RBC AUTO: 46.4 FL (ref 37–54)
DEPRECATED RDW RBC AUTO: 46.6 FL (ref 37–54)
DEPRECATED RDW RBC AUTO: 47 FL (ref 37–54)
DEPRECATED RDW RBC AUTO: 47.8 FL (ref 37–54)
DEPRECATED RDW RBC AUTO: 48.3 FL (ref 37–54)
DEPRECATED RDW RBC AUTO: 48.4 FL (ref 37–54)
DEPRECATED RDW RBC AUTO: 48.7 FL (ref 37–54)
DEPRECATED RDW RBC AUTO: 50.4 FL (ref 37–49)
DEPRECATED RDW RBC AUTO: 52 FL (ref 37–54)
DEPRECATED RDW RBC AUTO: 52.3 FL (ref 37–54)
DEPRECATED RDW RBC AUTO: 52.8 FL (ref 37–54)
DEPRECATED RDW RBC AUTO: 53.4 FL (ref 37–54)
DEPRECATED RDW RBC AUTO: 53.6 FL (ref 37–54)
DEPRECATED RDW RBC AUTO: 53.7 FL (ref 37–54)
DEPRECATED RDW RBC AUTO: 54.1 FL (ref 37–54)
DEPRECATED RDW RBC AUTO: 54.5 FL (ref 37–49)
DEPRECATED RDW RBC AUTO: 58.3 FL (ref 37–49)
DEPRECATED RDW RBC AUTO: 73.9 FL (ref 37–49)
DRVVT IMM 1:2 NP PPP: 42.4 SEC (ref 0–47)
DSDNA AB SER-ACNC: 1 IU/ML (ref 0–9)
E/E' RATIO: 10
ENA JO1 AB SER-ACNC: <0.2 AI (ref 0–0.9)
ENA RNP AB SER-ACNC: <0.2 AI (ref 0–0.9)
ENA SCL70 AB SER-ACNC: <0.2 AI (ref 0–0.9)
ENA SM AB SER-ACNC: <0.2 AI (ref 0–0.9)
ENA SS-A AB SER-ACNC: <0.2 AI (ref 0–0.9)
ENA SS-B AB SER-ACNC: <0.2 AI (ref 0–0.9)
EOSINOPHIL # BLD AUTO: 0.17 10*3/MM3 (ref 0–0.7)
EOSINOPHIL # BLD AUTO: 0.21 10*3/MM3 (ref 0–0.7)
EOSINOPHIL # BLD AUTO: 0.21 10*3/MM3 (ref 0–0.7)
EOSINOPHIL # BLD AUTO: 0.24 10*3/MM3 (ref 0–0.7)
EOSINOPHIL # BLD AUTO: 0.26 10*3/MM3 (ref 0–0.7)
EOSINOPHIL # BLD AUTO: 0.26 10*3/MM3 (ref 0–0.7)
EOSINOPHIL # BLD AUTO: 0.27 10*3/MM3 (ref 0–0.7)
EOSINOPHIL # BLD AUTO: 0.27 10*3/MM3 (ref 0–0.7)
EOSINOPHIL # BLD AUTO: 0.3 10*3/MM3 (ref 0–0.7)
EOSINOPHIL # BLD AUTO: 0.32 10*3/MM3 (ref 0–0.7)
EOSINOPHIL # BLD AUTO: 0.32 10*3/MM3 (ref 0–0.7)
EOSINOPHIL # BLD AUTO: 0.35 10*3/MM3 (ref 0–0.7)
EOSINOPHIL # BLD AUTO: 0.37 10*3/MM3 (ref 0–0.36)
EOSINOPHIL # BLD AUTO: 0.38 10*3/MM3 (ref 0–0.7)
EOSINOPHIL # BLD AUTO: 0.4 10*3/MM3 (ref 0–0.36)
EOSINOPHIL # BLD AUTO: 0.43 10*3/MM3 (ref 0–0.7)
EOSINOPHIL # BLD AUTO: 0.54 10*3/MM3 (ref 0–0.36)
EOSINOPHIL # BLD AUTO: 0.56 10*3/MM3 (ref 0–0.36)
EOSINOPHIL NFR BLD AUTO: 1 % (ref 0.3–6.2)
EOSINOPHIL NFR BLD AUTO: 1.4 % (ref 0.3–6.2)
EOSINOPHIL NFR BLD AUTO: 1.6 % (ref 0.3–6.2)
EOSINOPHIL NFR BLD AUTO: 1.6 % (ref 0.3–6.2)
EOSINOPHIL NFR BLD AUTO: 1.8 % (ref 0.3–6.2)
EOSINOPHIL NFR BLD AUTO: 1.8 % (ref 0.3–6.2)
EOSINOPHIL NFR BLD AUTO: 1.9 % (ref 0.3–6.2)
EOSINOPHIL NFR BLD AUTO: 2 % (ref 0.3–6.2)
EOSINOPHIL NFR BLD AUTO: 2 % (ref 0.3–6.2)
EOSINOPHIL NFR BLD AUTO: 2.1 % (ref 0.3–6.2)
EOSINOPHIL NFR BLD AUTO: 2.2 % (ref 0.3–6.2)
EOSINOPHIL NFR BLD AUTO: 2.2 % (ref 1–5)
EOSINOPHIL NFR BLD AUTO: 2.4 % (ref 0.3–6.2)
EOSINOPHIL NFR BLD AUTO: 2.7 % (ref 0.3–6.2)
EOSINOPHIL NFR BLD AUTO: 2.7 % (ref 1–5)
EOSINOPHIL NFR BLD AUTO: 3.4 % (ref 0.3–6.2)
EOSINOPHIL NFR BLD AUTO: 3.6 % (ref 1–5)
EOSINOPHIL NFR BLD AUTO: 4.4 % (ref 1–5)
ERYTHROCYTE [DISTWIDTH] IN BLOOD BY AUTOMATED COUNT: 15.4 % (ref 11.5–14.5)
ERYTHROCYTE [DISTWIDTH] IN BLOOD BY AUTOMATED COUNT: 15.5 % (ref 11.5–14.5)
ERYTHROCYTE [DISTWIDTH] IN BLOOD BY AUTOMATED COUNT: 15.6 % (ref 11.5–14.5)
ERYTHROCYTE [DISTWIDTH] IN BLOOD BY AUTOMATED COUNT: 15.6 % (ref 11.5–14.5)
ERYTHROCYTE [DISTWIDTH] IN BLOOD BY AUTOMATED COUNT: 15.8 % (ref 11.5–14.5)
ERYTHROCYTE [DISTWIDTH] IN BLOOD BY AUTOMATED COUNT: 15.8 % (ref 11.5–14.5)
ERYTHROCYTE [DISTWIDTH] IN BLOOD BY AUTOMATED COUNT: 16 % (ref 11.5–14.5)
ERYTHROCYTE [DISTWIDTH] IN BLOOD BY AUTOMATED COUNT: 16.1 % (ref 11.5–14.5)
ERYTHROCYTE [DISTWIDTH] IN BLOOD BY AUTOMATED COUNT: 16.2 % (ref 11.5–14.5)
ERYTHROCYTE [DISTWIDTH] IN BLOOD BY AUTOMATED COUNT: 16.4 % (ref 11.7–14.5)
ERYTHROCYTE [DISTWIDTH] IN BLOOD BY AUTOMATED COUNT: 17 % (ref 11.5–14.5)
ERYTHROCYTE [DISTWIDTH] IN BLOOD BY AUTOMATED COUNT: 17.1 % (ref 11.5–14.5)
ERYTHROCYTE [DISTWIDTH] IN BLOOD BY AUTOMATED COUNT: 17.1 % (ref 11.5–14.5)
ERYTHROCYTE [DISTWIDTH] IN BLOOD BY AUTOMATED COUNT: 17.4 % (ref 11.5–14.5)
ERYTHROCYTE [DISTWIDTH] IN BLOOD BY AUTOMATED COUNT: 17.5 % (ref 11.5–14.5)
ERYTHROCYTE [DISTWIDTH] IN BLOOD BY AUTOMATED COUNT: 17.5 % (ref 11.5–14.5)
ERYTHROCYTE [DISTWIDTH] IN BLOOD BY AUTOMATED COUNT: 17.6 % (ref 11.5–14.5)
ERYTHROCYTE [DISTWIDTH] IN BLOOD BY AUTOMATED COUNT: 19.9 % (ref 11.7–14.5)
ERYTHROCYTE [DISTWIDTH] IN BLOOD BY AUTOMATED COUNT: 21.8 % (ref 11.7–14.5)
ERYTHROCYTE [DISTWIDTH] IN BLOOD BY AUTOMATED COUNT: 28.1 % (ref 11.7–14.5)
ERYTHROCYTE [SEDIMENTATION RATE] IN BLOOD: 6 MM/HR (ref 0–20)
FERRITIN SERPL-MCNC: 228.3 NG/ML (ref 30–400)
FERRITIN SERPL-MCNC: 32.6 NG/ML (ref 30–400)
GAMMA GLOB SERPL ELPH-MCNC: 1.1 G/DL (ref 0.4–1.8)
GFR SERPL CREATININE-BSD FRML MDRD: 19 ML/MIN/1.73
GFR SERPL CREATININE-BSD FRML MDRD: 22 ML/MIN/1.73
GFR SERPL CREATININE-BSD FRML MDRD: 29 ML/MIN/1.73
GFR SERPL CREATININE-BSD FRML MDRD: 33 ML/MIN/1.73
GFR SERPL CREATININE-BSD FRML MDRD: 36 ML/MIN/1.73
GFR SERPL CREATININE-BSD FRML MDRD: 36 ML/MIN/1.73
GFR SERPL CREATININE-BSD FRML MDRD: 37 ML/MIN/1.73
GFR SERPL CREATININE-BSD FRML MDRD: 44 ML/MIN/1.73
GFR SERPL CREATININE-BSD FRML MDRD: 44 ML/MIN/1.73
GFR SERPL CREATININE-BSD FRML MDRD: 46 ML/MIN/1.73
GFR SERPL CREATININE-BSD FRML MDRD: 48 ML/MIN/1.73
GFR SERPL CREATININE-BSD FRML MDRD: 52 ML/MIN/1.73
GFR SERPL CREATININE-BSD FRML MDRD: 54 ML/MIN/1.73
GLOBULIN SER CALC-MCNC: 3.2 G/DL (ref 2.2–3.9)
GLOBULIN UR ELPH-MCNC: 3.2 GM/DL
GLOBULIN UR ELPH-MCNC: 3.3 GM/DL
GLOBULIN UR ELPH-MCNC: 3.3 GM/DL
GLOBULIN UR ELPH-MCNC: 3.4 GM/DL
GLOBULIN UR ELPH-MCNC: 3.5 GM/DL
GLOBULIN UR ELPH-MCNC: 3.5 GM/DL
GLOBULIN UR ELPH-MCNC: 3.6 GM/DL
GLOBULIN UR ELPH-MCNC: 3.8 GM/DL
GLUCOSE BLD-MCNC: 151 MG/DL (ref 65–99)
GLUCOSE BLD-MCNC: 167 MG/DL (ref 65–99)
GLUCOSE BLD-MCNC: 177 MG/DL (ref 65–99)
GLUCOSE BLD-MCNC: 183 MG/DL (ref 65–99)
GLUCOSE BLD-MCNC: 189 MG/DL (ref 65–99)
GLUCOSE BLD-MCNC: 192 MG/DL (ref 65–99)
GLUCOSE BLD-MCNC: 195 MG/DL (ref 65–99)
GLUCOSE BLD-MCNC: 199 MG/DL (ref 65–99)
GLUCOSE BLD-MCNC: 206 MG/DL (ref 65–99)
GLUCOSE BLD-MCNC: 286 MG/DL (ref 65–99)
GLUCOSE BLD-MCNC: 339 MG/DL (ref 65–99)
GLUCOSE BLD-MCNC: 417 MG/DL (ref 65–99)
GLUCOSE BLD-MCNC: 85 MG/DL (ref 65–99)
GLUCOSE BLDC GLUCOMTR-MCNC: 115 MG/DL (ref 70–130)
GLUCOSE BLDC GLUCOMTR-MCNC: 116 MG/DL (ref 70–130)
GLUCOSE BLDC GLUCOMTR-MCNC: 116 MG/DL (ref 70–130)
GLUCOSE BLDC GLUCOMTR-MCNC: 126 MG/DL (ref 70–130)
GLUCOSE BLDC GLUCOMTR-MCNC: 130 MG/DL (ref 70–130)
GLUCOSE BLDC GLUCOMTR-MCNC: 134 MG/DL (ref 70–130)
GLUCOSE BLDC GLUCOMTR-MCNC: 138 MG/DL (ref 70–130)
GLUCOSE BLDC GLUCOMTR-MCNC: 149 MG/DL (ref 70–130)
GLUCOSE BLDC GLUCOMTR-MCNC: 149 MG/DL (ref 70–130)
GLUCOSE BLDC GLUCOMTR-MCNC: 153 MG/DL (ref 70–130)
GLUCOSE BLDC GLUCOMTR-MCNC: 156 MG/DL (ref 70–130)
GLUCOSE BLDC GLUCOMTR-MCNC: 166 MG/DL (ref 70–130)
GLUCOSE BLDC GLUCOMTR-MCNC: 168 MG/DL (ref 70–130)
GLUCOSE BLDC GLUCOMTR-MCNC: 169 MG/DL (ref 70–130)
GLUCOSE BLDC GLUCOMTR-MCNC: 173 MG/DL (ref 70–130)
GLUCOSE BLDC GLUCOMTR-MCNC: 174 MG/DL (ref 70–130)
GLUCOSE BLDC GLUCOMTR-MCNC: 174 MG/DL (ref 70–130)
GLUCOSE BLDC GLUCOMTR-MCNC: 181 MG/DL (ref 70–130)
GLUCOSE BLDC GLUCOMTR-MCNC: 184 MG/DL (ref 70–130)
GLUCOSE BLDC GLUCOMTR-MCNC: 185 MG/DL (ref 70–130)
GLUCOSE BLDC GLUCOMTR-MCNC: 187 MG/DL (ref 70–130)
GLUCOSE BLDC GLUCOMTR-MCNC: 195 MG/DL (ref 70–130)
GLUCOSE BLDC GLUCOMTR-MCNC: 197 MG/DL (ref 70–130)
GLUCOSE BLDC GLUCOMTR-MCNC: 207 MG/DL (ref 70–130)
GLUCOSE BLDC GLUCOMTR-MCNC: 211 MG/DL (ref 70–130)
GLUCOSE BLDC GLUCOMTR-MCNC: 213 MG/DL (ref 70–130)
GLUCOSE BLDC GLUCOMTR-MCNC: 213 MG/DL (ref 70–130)
GLUCOSE BLDC GLUCOMTR-MCNC: 214 MG/DL (ref 70–130)
GLUCOSE BLDC GLUCOMTR-MCNC: 216 MG/DL (ref 70–130)
GLUCOSE BLDC GLUCOMTR-MCNC: 217 MG/DL (ref 70–130)
GLUCOSE BLDC GLUCOMTR-MCNC: 218 MG/DL (ref 70–130)
GLUCOSE BLDC GLUCOMTR-MCNC: 220 MG/DL (ref 70–130)
GLUCOSE BLDC GLUCOMTR-MCNC: 221 MG/DL (ref 70–130)
GLUCOSE BLDC GLUCOMTR-MCNC: 223 MG/DL (ref 70–130)
GLUCOSE BLDC GLUCOMTR-MCNC: 227 MG/DL (ref 70–130)
GLUCOSE BLDC GLUCOMTR-MCNC: 233 MG/DL (ref 70–130)
GLUCOSE BLDC GLUCOMTR-MCNC: 237 MG/DL (ref 70–130)
GLUCOSE BLDC GLUCOMTR-MCNC: 238 MG/DL (ref 70–130)
GLUCOSE BLDC GLUCOMTR-MCNC: 241 MG/DL (ref 70–130)
GLUCOSE BLDC GLUCOMTR-MCNC: 246 MG/DL (ref 70–130)
GLUCOSE BLDC GLUCOMTR-MCNC: 252 MG/DL (ref 70–130)
GLUCOSE BLDC GLUCOMTR-MCNC: 255 MG/DL (ref 70–130)
GLUCOSE BLDC GLUCOMTR-MCNC: 261 MG/DL (ref 70–130)
GLUCOSE BLDC GLUCOMTR-MCNC: 263 MG/DL (ref 70–130)
GLUCOSE BLDC GLUCOMTR-MCNC: 273 MG/DL (ref 70–130)
GLUCOSE BLDC GLUCOMTR-MCNC: 274 MG/DL (ref 70–130)
GLUCOSE BLDC GLUCOMTR-MCNC: 276 MG/DL (ref 70–130)
GLUCOSE BLDC GLUCOMTR-MCNC: 297 MG/DL (ref 70–130)
GLUCOSE BLDC GLUCOMTR-MCNC: 298 MG/DL (ref 70–130)
GLUCOSE BLDC GLUCOMTR-MCNC: 303 MG/DL (ref 70–130)
GLUCOSE BLDC GLUCOMTR-MCNC: 320 MG/DL (ref 70–130)
GLUCOSE BLDC GLUCOMTR-MCNC: 339 MG/DL (ref 70–130)
GLUCOSE BLDC GLUCOMTR-MCNC: 39 MG/DL (ref 70–130)
GLUCOSE BLDC GLUCOMTR-MCNC: 41 MG/DL (ref 70–130)
GLUCOSE BLDC GLUCOMTR-MCNC: 81 MG/DL (ref 70–130)
GLUCOSE BLDC GLUCOMTR-MCNC: 85 MG/DL (ref 70–130)
GLUCOSE BLDC GLUCOMTR-MCNC: 87 MG/DL (ref 70–130)
GLUCOSE BLDC GLUCOMTR-MCNC: 91 MG/DL (ref 70–130)
GLUCOSE BLDC GLUCOMTR-MCNC: 92 MG/DL (ref 70–130)
GLUCOSE UR STRIP-MCNC: NEGATIVE MG/DL
GLUCOSE UR STRIP-MCNC: NEGATIVE MG/DL
GRAM STN SPEC: NORMAL
GRAM STN SPEC: NORMAL
HBA1C MFR BLD: 7.56 % (ref 4.8–5.6)
HBA1C MFR BLD: 8.62 % (ref 4.8–5.6)
HCT VFR BLD AUTO: 37.6 % (ref 40.4–52.2)
HCT VFR BLD AUTO: 39.7 % (ref 40.4–52.2)
HCT VFR BLD AUTO: 39.8 % (ref 40.4–52.2)
HCT VFR BLD AUTO: 40.2 % (ref 40.4–52.2)
HCT VFR BLD AUTO: 40.5 % (ref 40.4–52.2)
HCT VFR BLD AUTO: 40.9 % (ref 40.4–52.2)
HCT VFR BLD AUTO: 41.1 % (ref 40.4–52.2)
HCT VFR BLD AUTO: 41.2 % (ref 40.4–52.2)
HCT VFR BLD AUTO: 41.3 % (ref 40.4–52.2)
HCT VFR BLD AUTO: 41.9 % (ref 40.4–52.2)
HCT VFR BLD AUTO: 43.3 % (ref 40.4–52.2)
HCT VFR BLD AUTO: 43.4 % (ref 40.4–52.2)
HCT VFR BLD AUTO: 43.4 % (ref 40.4–52.2)
HCT VFR BLD AUTO: 43.8 % (ref 40.4–52.2)
HCT VFR BLD AUTO: 43.9 % (ref 40.4–52.2)
HCT VFR BLD AUTO: 45.2 % (ref 40–49)
HCT VFR BLD AUTO: 47 % (ref 40.4–52.2)
HCT VFR BLD AUTO: 47.3 % (ref 40–49)
HCT VFR BLD AUTO: 48.8 % (ref 40–49)
HCT VFR BLD AUTO: 49.6 % (ref 40–49)
HGB BLD-MCNC: 12.1 G/DL (ref 13.7–17.6)
HGB BLD-MCNC: 12.2 G/DL (ref 13.7–17.6)
HGB BLD-MCNC: 12.5 G/DL (ref 13.7–17.6)
HGB BLD-MCNC: 12.6 G/DL (ref 13.7–17.6)
HGB BLD-MCNC: 12.6 G/DL (ref 13.7–17.6)
HGB BLD-MCNC: 13 G/DL (ref 13.7–17.6)
HGB BLD-MCNC: 13 G/DL (ref 13.7–17.6)
HGB BLD-MCNC: 13.1 G/DL (ref 13.7–17.6)
HGB BLD-MCNC: 13.2 G/DL (ref 13.7–17.6)
HGB BLD-MCNC: 13.3 G/DL (ref 13.7–17.6)
HGB BLD-MCNC: 13.4 G/DL (ref 13.7–17.6)
HGB BLD-MCNC: 13.5 G/DL (ref 13.5–16.5)
HGB BLD-MCNC: 13.5 G/DL (ref 13.7–17.6)
HGB BLD-MCNC: 13.9 G/DL (ref 13.7–17.6)
HGB BLD-MCNC: 14.1 G/DL (ref 13.7–17.6)
HGB BLD-MCNC: 15.2 G/DL (ref 13.5–16.5)
HGB BLD-MCNC: 15.7 G/DL (ref 13.5–16.5)
HGB BLD-MCNC: 15.9 G/DL (ref 13.5–16.5)
HGB RETIC QN: 25.2 PG (ref 29.8–36.1)
HGB RETIC QN: 29.7 PG (ref 29.8–36.1)
HGB RETIC QN: 32.1 PG (ref 29.8–36.1)
HGB UR QL STRIP.AUTO: NEGATIVE
HGB UR QL STRIP.AUTO: NEGATIVE
HOLD SPECIMEN: NORMAL
HOLD SPECIMEN: NORMAL
HYALINE CASTS UR QL AUTO: ABNORMAL /LPF
HYALINE CASTS UR QL AUTO: ABNORMAL /LPF
IF BLOCK AB SER-ACNC: 1.1 AU/ML (ref 0–1.1)
IGA SERPL-MCNC: 129 MG/DL (ref 61–437)
IGG SERPL-MCNC: 1129 MG/DL (ref 700–1600)
IGM SERPL-MCNC: 46 MG/DL (ref 15–143)
IMM GRANULOCYTES # BLD: 0.07 10*3/MM3 (ref 0–0.03)
IMM GRANULOCYTES # BLD: 0.09 10*3/MM3 (ref 0–0.03)
IMM GRANULOCYTES # BLD: 0.1 10*3/MM3 (ref 0–0.03)
IMM GRANULOCYTES # BLD: 0.1 10*3/MM3 (ref 0–0.03)
IMM GRANULOCYTES # BLD: 0.11 10*3/MM3 (ref 0–0.03)
IMM GRANULOCYTES # BLD: 0.13 10*3/MM3 (ref 0–0.03)
IMM GRANULOCYTES # BLD: 0.14 10*3/MM3 (ref 0–0.03)
IMM GRANULOCYTES # BLD: 0.14 10*3/MM3 (ref 0–0.03)
IMM GRANULOCYTES # BLD: 0.15 10*3/MM3 (ref 0–0.03)
IMM GRANULOCYTES # BLD: 0.15 10*3/MM3 (ref 0–0.03)
IMM GRANULOCYTES # BLD: 0.18 10*3/MM3 (ref 0–0.03)
IMM GRANULOCYTES # BLD: 0.21 10*3/MM3 (ref 0–0.03)
IMM GRANULOCYTES # BLD: 0.25 10*3/MM3 (ref 0–0.03)
IMM GRANULOCYTES # BLD: 0.32 10*3/MM3 (ref 0–0.03)
IMM GRANULOCYTES # BLD: 0.35 10*3/MM3 (ref 0–0.03)
IMM GRANULOCYTES # BLD: 0.52 10*3/MM3 (ref 0–0.03)
IMM GRANULOCYTES # BLD: 0.63 10*3/MM3 (ref 0–0.03)
IMM GRANULOCYTES # BLD: 0.66 10*3/MM3 (ref 0–0.03)
IMM GRANULOCYTES # BLD: 0.66 10*3/MM3 (ref 0–0.03)
IMM GRANULOCYTES # BLD: 0.72 10*3/MM3 (ref 0–0.03)
IMM GRANULOCYTES NFR BLD: 0.6 % (ref 0–0.5)
IMM GRANULOCYTES NFR BLD: 0.8 % (ref 0–0.5)
IMM GRANULOCYTES NFR BLD: 0.8 % (ref 0–0.5)
IMM GRANULOCYTES NFR BLD: 1 % (ref 0–0.5)
IMM GRANULOCYTES NFR BLD: 1 % (ref 0–0.5)
IMM GRANULOCYTES NFR BLD: 1.1 % (ref 0–0.5)
IMM GRANULOCYTES NFR BLD: 1.2 % (ref 0–0.5)
IMM GRANULOCYTES NFR BLD: 1.2 % (ref 0–0.5)
IMM GRANULOCYTES NFR BLD: 1.4 % (ref 0–0.5)
IMM GRANULOCYTES NFR BLD: 1.5 % (ref 0–0.5)
IMM GRANULOCYTES NFR BLD: 1.6 % (ref 0–0.5)
IMM GRANULOCYTES NFR BLD: 2 % (ref 0–0.5)
IMM GRANULOCYTES NFR BLD: 2.4 % (ref 0–0.5)
IMM GRANULOCYTES NFR BLD: 3.1 % (ref 0–0.5)
IMM GRANULOCYTES NFR BLD: 3.3 % (ref 0–0.5)
IMM GRANULOCYTES NFR BLD: 3.6 % (ref 0–0.5)
IMM GRANULOCYTES NFR BLD: 3.7 % (ref 0–0.5)
IMM RETICS NFR: 4 % (ref 3–15.8)
IMM RETICS NFR: 4.9 % (ref 3–15.8)
IMM RETICS NFR: 5.4 % (ref 3–15.8)
INR PPP: 1.4 RATIO
INR PPP: 1.55 (ref 0.9–1.1)
INR PPP: 1.57 (ref 0.9–1.1)
INR PPP: 1.61 (ref 0.9–1.1)
INR PPP: 1.64 (ref 0.9–1.1)
INR PPP: 1.68 (ref 0.9–1.1)
INR PPP: 1.69 (ref 0.9–1.1)
INR PPP: 1.96 (ref 0.9–1.1)
INR PPP: 2.72 (ref 0.9–1.1)
INR PPP: 3.08 (ref 0.9–1.1)
INR PPP: 3.64 (ref 0.9–1.1)
INR PPP: 3.93 (ref 0.9–1.1)
INR PPP: 4.49 (ref 0.9–1.1)
INTERPRETATION SERPL IEP-IMP: ABNORMAL
IRON 24H UR-MRATE: 13 MCG/DL (ref 59–158)
IRON 24H UR-MRATE: 28 MCG/DL (ref 59–158)
IRON 24H UR-MRATE: 34 MCG/DL (ref 59–158)
IRON SATN MFR SERPL: 6 % (ref 20–50)
IRON SATN MFR SERPL: 8 % (ref 14–48)
IRON SATN MFR SERPL: 9 % (ref 14–48)
KAPPA LC SERPL-MCNC: 94.6 MG/L (ref 3.3–19.4)
KAPPA LC/LAMBDA SER: 2.62 {RATIO} (ref 0.26–1.65)
KETONES UR QL STRIP: ABNORMAL
KETONES UR QL STRIP: NEGATIVE
LA NT DPL PPP: 67.7 SEC (ref 0–55)
LA NT DPL/LA NT HPL PPP-RTO: 0.87 RATIO (ref 0–1.4)
LA NT PLATELET PPP: 48.8 SEC (ref 0–51.9)
LAB AP CASE REPORT: NORMAL
LAB AP CASE REPORT: NORMAL
LAC INTERPRETATION: ABNORMAL
LAMBDA LC FREE SERPL-MCNC: 36.1 MG/L (ref 5.7–26.3)
LARGE PLATELETS: NORMAL
LDH FLD-CCNC: 69 U/L
LEFT ATRIUM VOLUME INDEX: 29 ML/M2
LEFT ATRIUM VOLUME: 49 CM3
LEUKOCYTE ESTERASE UR QL STRIP.AUTO: NEGATIVE
LEUKOCYTE ESTERASE UR QL STRIP.AUTO: NEGATIVE
LIPASE SERPL-CCNC: 10 U/L (ref 13–60)
LIPASE SERPL-CCNC: 12 U/L (ref 13–60)
LIPASE SERPL-CCNC: 27 U/L (ref 13–60)
LUPUS ANTICOAGULANT REFLEX: ABNORMAL
LV EF 2D ECHO EST: 55 %
LYMPHOCYTES # BLD AUTO: 0.66 10*3/MM3 (ref 0.9–4.8)
LYMPHOCYTES # BLD AUTO: 0.81 10*3/MM3 (ref 0.9–4.8)
LYMPHOCYTES # BLD AUTO: 0.96 10*3/MM3 (ref 0.9–4.8)
LYMPHOCYTES # BLD AUTO: 1.02 10*3/MM3 (ref 0.9–4.8)
LYMPHOCYTES # BLD AUTO: 1.11 10*3/MM3 (ref 0.9–4.8)
LYMPHOCYTES # BLD AUTO: 1.16 10*3/MM3 (ref 0.9–4.8)
LYMPHOCYTES # BLD AUTO: 1.21 10*3/MM3 (ref 0.9–4.8)
LYMPHOCYTES # BLD AUTO: 1.35 10*3/MM3 (ref 1–3.5)
LYMPHOCYTES # BLD AUTO: 1.36 10*3/MM3 (ref 0.9–4.8)
LYMPHOCYTES # BLD AUTO: 1.48 10*3/MM3 (ref 1–3.5)
LYMPHOCYTES # BLD AUTO: 1.57 10*3/MM3 (ref 0.9–4.8)
LYMPHOCYTES # BLD AUTO: 1.69 10*3/MM3 (ref 1–3.5)
LYMPHOCYTES # BLD AUTO: 1.69 10*3/MM3 (ref 1–3.5)
LYMPHOCYTES # BLD AUTO: 1.73 10*3/MM3 (ref 0.9–4.8)
LYMPHOCYTES # BLD AUTO: 1.9 10*3/MM3 (ref 0.9–4.8)
LYMPHOCYTES # BLD AUTO: 2.06 10*3/MM3 (ref 0.9–4.8)
LYMPHOCYTES # BLD AUTO: 2.24 10*3/MM3 (ref 0.9–4.8)
LYMPHOCYTES # BLD AUTO: 2.3 10*3/MM3 (ref 0.9–4.8)
LYMPHOCYTES NFR BLD AUTO: 10.1 % (ref 19.6–45.3)
LYMPHOCYTES NFR BLD AUTO: 10.4 % (ref 19.6–45.3)
LYMPHOCYTES NFR BLD AUTO: 10.9 % (ref 19.6–45.3)
LYMPHOCYTES NFR BLD AUTO: 10.9 % (ref 19.6–45.3)
LYMPHOCYTES NFR BLD AUTO: 11.3 % (ref 20–49)
LYMPHOCYTES NFR BLD AUTO: 11.5 % (ref 19.6–45.3)
LYMPHOCYTES NFR BLD AUTO: 12.2 % (ref 19.6–45.3)
LYMPHOCYTES NFR BLD AUTO: 13.2 % (ref 20–49)
LYMPHOCYTES NFR BLD AUTO: 5.4 % (ref 19.6–45.3)
LYMPHOCYTES NFR BLD AUTO: 6.7 % (ref 19.6–45.3)
LYMPHOCYTES NFR BLD AUTO: 7.1 % (ref 19.6–45.3)
LYMPHOCYTES NFR BLD AUTO: 7.5 % (ref 19.6–45.3)
LYMPHOCYTES NFR BLD AUTO: 8.3 % (ref 19.6–45.3)
LYMPHOCYTES NFR BLD AUTO: 8.4 % (ref 19.6–45.3)
LYMPHOCYTES NFR BLD AUTO: 8.7 % (ref 19.6–45.3)
LYMPHOCYTES NFR BLD AUTO: 8.9 % (ref 19.6–45.3)
LYMPHOCYTES NFR BLD AUTO: 9 % (ref 20–49)
LYMPHOCYTES NFR BLD AUTO: 9 % (ref 20–49)
LYMPHOCYTES NFR BLD AUTO: 9.6 % (ref 19.6–45.3)
LYMPHOCYTES NFR BLD AUTO: 9.9 % (ref 19.6–45.3)
LYMPHOCYTES NFR FLD MANUAL: 43 %
LYMPHOCYTES NFR FLD MANUAL: 46 %
Lab: ABNORMAL
Lab: NORMAL
M-SPIKE: ABNORMAL G/DL
MAGNESIUM SERPL-MCNC: 1.9 MG/DL (ref 1.6–2.4)
MAGNESIUM SERPL-MCNC: 2 MG/DL (ref 1.6–2.4)
MCH RBC QN AUTO: 22.9 PG (ref 27–33)
MCH RBC QN AUTO: 25.2 PG (ref 27–33)
MCH RBC QN AUTO: 25.5 PG (ref 27–32.7)
MCH RBC QN AUTO: 25.7 PG (ref 27–32.7)
MCH RBC QN AUTO: 25.7 PG (ref 27–33)
MCH RBC QN AUTO: 25.8 PG (ref 27–32.7)
MCH RBC QN AUTO: 25.9 PG (ref 27–32.7)
MCH RBC QN AUTO: 26 PG (ref 27–32.7)
MCH RBC QN AUTO: 26.1 PG (ref 27–32.7)
MCH RBC QN AUTO: 26.2 PG (ref 27–32.7)
MCH RBC QN AUTO: 26.5 PG (ref 27–32.7)
MCH RBC QN AUTO: 26.7 PG (ref 27–32.7)
MCH RBC QN AUTO: 26.7 PG (ref 27–32.7)
MCH RBC QN AUTO: 27.4 PG (ref 27–33)
MCHC RBC AUTO-ENTMCNC: 29.9 G/DL (ref 32.6–36.4)
MCHC RBC AUTO-ENTMCNC: 29.9 G/DL (ref 32–35)
MCHC RBC AUTO-ENTMCNC: 30 G/DL (ref 32.6–36.4)
MCHC RBC AUTO-ENTMCNC: 30 G/DL (ref 32.6–36.4)
MCHC RBC AUTO-ENTMCNC: 30.1 G/DL (ref 32.6–36.4)
MCHC RBC AUTO-ENTMCNC: 30.1 G/DL (ref 32.6–36.4)
MCHC RBC AUTO-ENTMCNC: 30.3 G/DL (ref 32.6–36.4)
MCHC RBC AUTO-ENTMCNC: 30.5 G/DL (ref 32.6–36.4)
MCHC RBC AUTO-ENTMCNC: 31.5 G/DL (ref 32.6–36.4)
MCHC RBC AUTO-ENTMCNC: 31.7 G/DL (ref 32.6–36.4)
MCHC RBC AUTO-ENTMCNC: 32 G/DL (ref 32.6–36.4)
MCHC RBC AUTO-ENTMCNC: 32 G/DL (ref 32.6–36.4)
MCHC RBC AUTO-ENTMCNC: 32.1 G/DL (ref 32.6–36.4)
MCHC RBC AUTO-ENTMCNC: 32.1 G/DL (ref 32–35)
MCHC RBC AUTO-ENTMCNC: 32.1 G/DL (ref 32–35)
MCHC RBC AUTO-ENTMCNC: 32.2 G/DL (ref 32.6–36.4)
MCHC RBC AUTO-ENTMCNC: 32.2 G/DL (ref 32–35)
MCHC RBC AUTO-ENTMCNC: 32.5 G/DL (ref 32.6–36.4)
MCHC RBC AUTO-ENTMCNC: 32.5 G/DL (ref 32.6–36.4)
MCHC RBC AUTO-ENTMCNC: 32.8 G/DL (ref 32.6–36.4)
MCV RBC AUTO: 76.7 FL (ref 83–97)
MCV RBC AUTO: 78.3 FL (ref 83–97)
MCV RBC AUTO: 80.3 FL (ref 83–97)
MCV RBC AUTO: 81.2 FL (ref 79.8–96.2)
MCV RBC AUTO: 81.4 FL (ref 79.8–96.2)
MCV RBC AUTO: 81.5 FL (ref 79.8–96.2)
MCV RBC AUTO: 81.5 FL (ref 79.8–96.2)
MCV RBC AUTO: 81.7 FL (ref 79.8–96.2)
MCV RBC AUTO: 81.8 FL (ref 79.8–96.2)
MCV RBC AUTO: 82.1 FL (ref 79.8–96.2)
MCV RBC AUTO: 82.2 FL (ref 79.8–96.2)
MCV RBC AUTO: 82.4 FL (ref 79.8–96.2)
MCV RBC AUTO: 84.4 FL (ref 79.8–96.2)
MCV RBC AUTO: 84.6 FL (ref 79.8–96.2)
MCV RBC AUTO: 84.9 FL (ref 79.8–96.2)
MCV RBC AUTO: 85 FL (ref 79.8–96.2)
MCV RBC AUTO: 85 FL (ref 79.8–96.2)
MCV RBC AUTO: 85.2 FL (ref 79.8–96.2)
MCV RBC AUTO: 85.2 FL (ref 83–97)
MCV RBC AUTO: 85.3 FL (ref 79.8–96.2)
METHOD: NORMAL
METHOD: NORMAL
METHYLMALONATE SERPL-SCNC: 286 NMOL/L (ref 0–378)
MONOCYTES # BLD AUTO: 0.97 10*3/MM3 (ref 0.2–1.2)
MONOCYTES # BLD AUTO: 1.11 10*3/MM3 (ref 0.2–1.2)
MONOCYTES # BLD AUTO: 1.13 10*3/MM3 (ref 0.2–1.2)
MONOCYTES # BLD AUTO: 1.13 10*3/MM3 (ref 0.2–1.2)
MONOCYTES # BLD AUTO: 1.14 10*3/MM3 (ref 0.25–0.8)
MONOCYTES # BLD AUTO: 1.16 10*3/MM3 (ref 0.2–1.2)
MONOCYTES # BLD AUTO: 1.18 10*3/MM3 (ref 0.2–1.2)
MONOCYTES # BLD AUTO: 1.18 10*3/MM3 (ref 0.2–1.2)
MONOCYTES # BLD AUTO: 1.19 10*3/MM3 (ref 0.25–0.8)
MONOCYTES # BLD AUTO: 1.23 10*3/MM3 (ref 0.25–0.8)
MONOCYTES # BLD AUTO: 1.3 10*3/MM3 (ref 0.25–0.8)
MONOCYTES # BLD AUTO: 1.35 10*3/MM3 (ref 0.2–1.2)
MONOCYTES # BLD AUTO: 1.43 10*3/MM3 (ref 0.2–1.2)
MONOCYTES # BLD AUTO: 1.62 10*3/MM3 (ref 0.2–1.2)
MONOCYTES # BLD AUTO: 1.94 10*3/MM3 (ref 0.2–1.2)
MONOCYTES # BLD AUTO: 2.04 10*3/MM3 (ref 0.2–1.2)
MONOCYTES # BLD AUTO: 2.15 10*3/MM3 (ref 0.2–1.2)
MONOCYTES # BLD AUTO: 2.33 10*3/MM3 (ref 0.2–1.2)
MONOCYTES NFR BLD AUTO: 10 % (ref 5–12)
MONOCYTES NFR BLD AUTO: 10.1 % (ref 5–12)
MONOCYTES NFR BLD AUTO: 10.4 % (ref 5–12)
MONOCYTES NFR BLD AUTO: 10.8 % (ref 5–12)
MONOCYTES NFR BLD AUTO: 11.1 % (ref 5–12)
MONOCYTES NFR BLD AUTO: 11.3 % (ref 5–12)
MONOCYTES NFR BLD AUTO: 11.7 % (ref 5–12)
MONOCYTES NFR BLD AUTO: 13.2 % (ref 5–12)
MONOCYTES NFR BLD AUTO: 5.3 % (ref 5–12)
MONOCYTES NFR BLD AUTO: 6.7 % (ref 5–12)
MONOCYTES NFR BLD AUTO: 7.5 % (ref 4–12)
MONOCYTES NFR BLD AUTO: 8 % (ref 4–12)
MONOCYTES NFR BLD AUTO: 8.7 % (ref 4–12)
MONOCYTES NFR BLD AUTO: 8.7 % (ref 5–12)
MONOCYTES NFR BLD AUTO: 8.9 % (ref 4–12)
MONOCYTES NFR BLD AUTO: 9 % (ref 5–12)
MONOCYTES NFR BLD AUTO: 9.2 % (ref 5–12)
MONOCYTES NFR BLD AUTO: 9.2 % (ref 5–12)
MONOCYTES NFR BLD AUTO: 9.6 % (ref 5–12)
MONOCYTES NFR BLD AUTO: 9.8 % (ref 5–12)
MONOCYTES NFR FLD: 14 %
MONOCYTES NFR FLD: 21 %
MONOS+MACROS NFR FLD: 8 %
NEUTROPHILS # BLD AUTO: 10.39 10*3/MM3 (ref 1.9–8.1)
NEUTROPHILS # BLD AUTO: 10.9 10*3/MM3 (ref 1.9–8.1)
NEUTROPHILS # BLD AUTO: 11.14 10*3/MM3 (ref 1.5–7)
NEUTROPHILS # BLD AUTO: 11.46 10*3/MM3 (ref 1.5–7)
NEUTROPHILS # BLD AUTO: 12.88 10*3/MM3 (ref 1.9–8.1)
NEUTROPHILS # BLD AUTO: 12.91 10*3/MM3 (ref 1.9–8.1)
NEUTROPHILS # BLD AUTO: 12.92 10*3/MM3 (ref 1.5–7)
NEUTROPHILS # BLD AUTO: 13.69 10*3/MM3 (ref 1.9–8.1)
NEUTROPHILS # BLD AUTO: 15.59 10*3/MM3 (ref 1.9–8.1)
NEUTROPHILS # BLD AUTO: 16.58 10*3/MM3 (ref 1.9–8.1)
NEUTROPHILS # BLD AUTO: 16.74 10*3/MM3 (ref 1.9–8.1)
NEUTROPHILS # BLD AUTO: 17.8 10*3/MM3 (ref 1.9–8.1)
NEUTROPHILS # BLD AUTO: 6.89 10*3/MM3 (ref 1.9–8.1)
NEUTROPHILS # BLD AUTO: 8.26 10*3/MM3 (ref 1.9–8.1)
NEUTROPHILS # BLD AUTO: 8.26 10*3/MM3 (ref 1.9–8.1)
NEUTROPHILS # BLD AUTO: 8.79 10*3/MM3 (ref 1.9–8.1)
NEUTROPHILS # BLD AUTO: 9.08 10*3/MM3 (ref 1.5–7)
NEUTROPHILS # BLD AUTO: 9.37 10*3/MM3 (ref 1.9–8.1)
NEUTROPHILS # BLD AUTO: 9.8 10*3/MM3 (ref 1.9–8.1)
NEUTROPHILS # BLD AUTO: 9.83 10*3/MM3 (ref 1.9–8.1)
NEUTROPHILS NFR BLD AUTO: 70.3 % (ref 42.7–76)
NEUTROPHILS NFR BLD AUTO: 70.8 % (ref 39–75)
NEUTROPHILS NFR BLD AUTO: 71.9 % (ref 42.7–76)
NEUTROPHILS NFR BLD AUTO: 73.5 % (ref 42.7–76)
NEUTROPHILS NFR BLD AUTO: 74.7 % (ref 39–75)
NEUTROPHILS NFR BLD AUTO: 74.8 % (ref 42.7–76)
NEUTROPHILS NFR BLD AUTO: 74.8 % (ref 42.7–76)
NEUTROPHILS NFR BLD AUTO: 75.6 % (ref 42.7–76)
NEUTROPHILS NFR BLD AUTO: 76.3 % (ref 42.7–76)
NEUTROPHILS NFR BLD AUTO: 76.5 % (ref 42.7–76)
NEUTROPHILS NFR BLD AUTO: 76.5 % (ref 42.7–76)
NEUTROPHILS NFR BLD AUTO: 76.9 % (ref 39–75)
NEUTROPHILS NFR BLD AUTO: 77.6 % (ref 42.7–76)
NEUTROPHILS NFR BLD AUTO: 77.9 % (ref 42.7–76)
NEUTROPHILS NFR BLD AUTO: 77.9 % (ref 42.7–76)
NEUTROPHILS NFR BLD AUTO: 78.6 % (ref 39–75)
NEUTROPHILS NFR BLD AUTO: 80.5 % (ref 42.7–76)
NEUTROPHILS NFR BLD AUTO: 80.7 % (ref 42.7–76)
NEUTROPHILS NFR BLD AUTO: 81 % (ref 42.7–76)
NEUTROPHILS NFR BLD AUTO: 82.6 % (ref 42.7–76)
NEUTROPHILS NFR FLD MANUAL: 32 %
NEUTROPHILS NFR FLD MANUAL: 36 %
NITRITE UR QL STRIP: NEGATIVE
NITRITE UR QL STRIP: NEGATIVE
NORMAL PLASMA PTT: 13.7 SECONDS
NRBC BLD MANUAL-RTO: 0 /100 WBC (ref 0–0)
NUC CELL # FLD: 24 /MM3
NUC CELL # FLD: 419 /MM3
OVALOCYTES BLD QL SMEAR: NORMAL
PATH REPORT.FINAL DX SPEC: NORMAL
PATH REPORT.FINAL DX SPEC: NORMAL
PATH REPORT.GROSS SPEC: NORMAL
PATH REPORT.GROSS SPEC: NORMAL
PCA AB SER-ACNC: 79.4 UNITS (ref 0–20)
PH UR STRIP.AUTO: 5.5 [PH] (ref 5–8)
PH UR STRIP.AUTO: <=5 [PH] (ref 5–8)
PHOSPHATE SERPL-MCNC: 2.6 MG/DL (ref 2.5–4.5)
PHOSPHATE SERPL-MCNC: 4.9 MG/DL (ref 2.5–4.5)
PLAT MORPH BLD: NORMAL
PLAT MORPH BLD: NORMAL
PLATELET # BLD AUTO: 161 10*3/MM3 (ref 140–500)
PLATELET # BLD AUTO: 163 10*3/MM3 (ref 140–500)
PLATELET # BLD AUTO: 165 10*3/MM3 (ref 140–500)
PLATELET # BLD AUTO: 168 10*3/MM3 (ref 140–500)
PLATELET # BLD AUTO: 170 10*3/MM3 (ref 140–500)
PLATELET # BLD AUTO: 183 10*3/MM3 (ref 140–500)
PLATELET # BLD AUTO: 183 10*3/MM3 (ref 140–500)
PLATELET # BLD AUTO: 185 10*3/MM3 (ref 140–500)
PLATELET # BLD AUTO: 186 10*3/MM3 (ref 140–500)
PLATELET # BLD AUTO: 191 10*3/MM3 (ref 140–500)
PLATELET # BLD AUTO: 193 10*3/MM3 (ref 140–500)
PLATELET # BLD AUTO: 193 10*3/MM3 (ref 140–500)
PLATELET # BLD AUTO: 202 10*3/MM3 (ref 140–500)
PLATELET # BLD AUTO: 203 10*3/MM3 (ref 140–500)
PLATELET # BLD AUTO: 211 10*3/MM3 (ref 140–500)
PLATELET # BLD AUTO: 216 10*3/MM3 (ref 140–500)
PLATELET # BLD AUTO: 299 10*3/MM3 (ref 150–375)
PLATELET # BLD AUTO: 325 10*3/MM3 (ref 150–375)
PLATELET # BLD AUTO: 349 10*3/MM3 (ref 150–375)
PLATELET # BLD AUTO: 359 10*3/MM3 (ref 150–375)
PMV BLD AUTO: 12.8 FL (ref 8.9–12.1)
PMV BLD AUTO: ABNORMAL FL (ref 6–12)
POTASSIUM BLD-SCNC: 3.6 MMOL/L (ref 3.5–5.2)
POTASSIUM BLD-SCNC: 3.8 MMOL/L (ref 3.5–5.2)
POTASSIUM BLD-SCNC: 3.9 MMOL/L (ref 3.5–5.2)
POTASSIUM BLD-SCNC: 3.9 MMOL/L (ref 3.5–5.2)
POTASSIUM BLD-SCNC: 4.1 MMOL/L (ref 3.5–5.2)
POTASSIUM BLD-SCNC: 4.1 MMOL/L (ref 3.5–5.2)
POTASSIUM BLD-SCNC: 4.2 MMOL/L (ref 3.5–5.2)
POTASSIUM BLD-SCNC: 4.2 MMOL/L (ref 3.5–5.2)
POTASSIUM BLD-SCNC: 4.3 MMOL/L (ref 3.5–5.2)
POTASSIUM BLD-SCNC: 4.4 MMOL/L (ref 3.5–5.2)
POTASSIUM BLD-SCNC: 4.5 MMOL/L (ref 3.5–5.2)
POTASSIUM BLD-SCNC: 4.5 MMOL/L (ref 3.5–5.2)
POTASSIUM BLD-SCNC: 4.8 MMOL/L (ref 3.5–5.2)
PROCALCITONIN SERPL-MCNC: 0.22 NG/ML (ref 0.1–0.25)
PROT FLD-MCNC: <1 G/DL
PROT SERPL-MCNC: 5.5 G/DL (ref 6–8.5)
PROT SERPL-MCNC: 5.6 G/DL (ref 6–8.5)
PROT SERPL-MCNC: 5.7 G/DL (ref 6–8.5)
PROT SERPL-MCNC: 5.8 G/DL (ref 6–8.5)
PROT SERPL-MCNC: 5.9 G/DL (ref 6–8.5)
PROT SERPL-MCNC: 6.2 G/DL (ref 6–8.5)
PROT SERPL-MCNC: 6.3 G/DL (ref 6–8.5)
PROT SERPL-MCNC: 6.5 G/DL (ref 6–8.5)
PROT SERPL-MCNC: 6.8 G/DL (ref 6–8.5)
PROT UR QL STRIP: ABNORMAL
PROT UR QL STRIP: ABNORMAL
PROTHROMBIN TIME: 14.6 SEC
PROTHROMBIN TIME: 18 SECONDS (ref 11.7–14.2)
PROTHROMBIN TIME: 18.2 SECONDS (ref 11.7–14.2)
PROTHROMBIN TIME: 18.6 SECONDS (ref 11.7–14.2)
PROTHROMBIN TIME: 18.8 SECONDS (ref 11.7–14.2)
PROTHROMBIN TIME: 19.2 SECONDS (ref 11.7–14.2)
PROTHROMBIN TIME: 19.2 SECONDS (ref 11.7–14.2)
PROTHROMBIN TIME: 21.6 SECONDS (ref 11.7–14.2)
PROTHROMBIN TIME: 23.7 SECONDS (ref 11.7–14.2)
PROTHROMBIN TIME: 27.9 SECONDS (ref 11.7–14.2)
PROTHROMBIN TIME: 30.9 SECONDS (ref 11.7–14.2)
PROTHROMBIN TIME: 35.1 SECONDS (ref 11.7–14.2)
PROTHROMBIN TIME: 37.3 SECONDS (ref 11.7–14.2)
PROTHROMBIN TIME: 41.4 SECONDS (ref 11.7–14.2)
PSA SERPL-MCNC: <0.014 NG/ML (ref 0–4)
PT MIX W PLASMA: 15.5 SECONDS (ref 11.7–14.2)
RBC # BLD AUTO: 4.62 10*6/MM3 (ref 4.6–6)
RBC # BLD AUTO: 4.73 10*6/MM3 (ref 4.6–6)
RBC # BLD AUTO: 4.82 10*6/MM3 (ref 4.6–6)
RBC # BLD AUTO: 4.84 10*6/MM3 (ref 4.6–6)
RBC # BLD AUTO: 4.95 10*6/MM3 (ref 4.6–6)
RBC # BLD AUTO: 4.97 10*6/MM3 (ref 4.6–6)
RBC # BLD AUTO: 5.02 10*6/MM3 (ref 4.6–6)
RBC # BLD AUTO: 5.02 10*6/MM3 (ref 4.6–6)
RBC # BLD AUTO: 5.05 10*6/MM3 (ref 4.6–6)
RBC # BLD AUTO: 5.06 10*6/MM3 (ref 4.6–6)
RBC # BLD AUTO: 5.09 10*6/MM3 (ref 4.6–6)
RBC # BLD AUTO: 5.13 10*6/MM3 (ref 4.6–6)
RBC # BLD AUTO: 5.14 10*6/MM3 (ref 4.6–6)
RBC # BLD AUTO: 5.17 10*6/MM3 (ref 4.6–6)
RBC # BLD AUTO: 5.31 10*6/MM3 (ref 4.6–6)
RBC # BLD AUTO: 5.53 10*6/MM3 (ref 4.6–6)
RBC # BLD AUTO: 5.73 10*6/MM3 (ref 4.3–5.5)
RBC # BLD AUTO: 5.89 10*6/MM3 (ref 4.3–5.5)
RBC # BLD AUTO: 6.04 10*6/MM3 (ref 4.3–5.5)
RBC # BLD AUTO: 6.18 10*6/MM3 (ref 4.3–5.5)
RBC # FLD AUTO: 136 /MM3
RBC # FLD AUTO: 1688 /MM3
RBC # UR: ABNORMAL /HPF
RBC # UR: ABNORMAL /HPF
REF LAB TEST METHOD: ABNORMAL
REF LAB TEST METHOD: ABNORMAL
REF LAB TEST METHOD: NORMAL
REF LAB TEST METHOD: NORMAL
RETICS/RBC NFR AUTO: 1.06 % (ref 0.6–2)
RETICS/RBC NFR AUTO: 1.3 % (ref 0.6–2)
RETICS/RBC NFR AUTO: 1.45 % (ref 0.6–2)
SCHISTOCYTES BLD QL SMEAR: NORMAL
SCREEN DRVVT/NORMAL: 0.8 RATIO
SCREEN DRVVT: 55.6 SEC (ref 0–47)
SCREEN DRVVT: 61.1 SEC
SODIUM BLD-SCNC: 134 MMOL/L (ref 136–145)
SODIUM BLD-SCNC: 135 MMOL/L (ref 136–145)
SODIUM BLD-SCNC: 136 MMOL/L (ref 136–145)
SODIUM BLD-SCNC: 138 MMOL/L (ref 136–145)
SODIUM BLD-SCNC: 139 MMOL/L (ref 136–145)
SODIUM BLD-SCNC: 140 MMOL/L (ref 136–145)
SODIUM BLD-SCNC: 140 MMOL/L (ref 136–145)
SODIUM BLD-SCNC: 142 MMOL/L (ref 136–145)
SODIUM UR-SCNC: <20 MMOL/L
SP GR UR STRIP: 1.02 (ref 1–1.03)
SP GR UR STRIP: 1.03 (ref 1–1.03)
SPHEROCYTES BLD QL SMEAR: NORMAL
SQUAMOUS #/AREA URNS HPF: ABNORMAL /HPF
SQUAMOUS #/AREA URNS HPF: ABNORMAL /HPF
TARGETS BLD QL SMEAR: NORMAL
THROMBIN TIME: 20.6 SEC (ref 0–23)
THROMBIN TIME: 29.5 SEC
THROMBIN TIME: 30 SECONDS (ref 15.7–20.4)
TIBC SERPL-MCNC: 207 MCG/DL (ref 298–536)
TIBC SERPL-MCNC: 356 MCG/DL (ref 249–505)
TIBC SERPL-MCNC: 365 MCG/DL (ref 249–505)
TRANSFERRIN SERPL-MCNC: 139 MG/DL (ref 200–360)
TRANSFERRIN SERPL-MCNC: 254 MG/DL (ref 200–360)
TRANSFERRIN SERPL-MCNC: 261 MG/DL (ref 200–360)
TSH SERPL DL<=0.05 MIU/L-ACNC: 1.61 MIU/ML (ref 0.27–4.2)
UPPER ARTERIAL LEFT ARM BRACHIAL SYS MAX: 154 MMHG
UPPER ARTERIAL RIGHT ARM BRACHIAL SYS MAX: 168 MMHG
URATE SERPL-MCNC: 9.9 MG/DL (ref 3.4–7)
UROBILINOGEN UR QL STRIP: ABNORMAL
UROBILINOGEN UR QL STRIP: ABNORMAL
VIT B12 BLD-MCNC: 1633 PG/ML (ref 250–999)
VIT B12 BLD-MCNC: >2000 PG/ML (ref 211–946)
WBC MORPH BLD: NORMAL
WBC NRBC COR # BLD: 10.83 10*3/MM3 (ref 4.5–10.7)
WBC NRBC COR # BLD: 11.06 10*3/MM3 (ref 4.5–10.7)
WBC NRBC COR # BLD: 11.5 10*3/MM3 (ref 4.5–10.7)
WBC NRBC COR # BLD: 12.17 10*3/MM3 (ref 4.5–10.7)
WBC NRBC COR # BLD: 12.25 10*3/MM3 (ref 4.5–10.7)
WBC NRBC COR # BLD: 12.61 10*3/MM3 (ref 4.5–10.7)
WBC NRBC COR # BLD: 12.81 10*3/MM3 (ref 4–10)
WBC NRBC COR # BLD: 13.39 10*3/MM3 (ref 4.5–10.7)
WBC NRBC COR # BLD: 13.51 10*3/MM3 (ref 4.5–10.7)
WBC NRBC COR # BLD: 14.92 10*3/MM3 (ref 4–10)
WBC NRBC COR # BLD: 14.92 10*3/MM3 (ref 4–10)
WBC NRBC COR # BLD: 16.45 10*3/MM3 (ref 4–10)
WBC NRBC COR # BLD: 17.58 10*3/MM3 (ref 4.5–10.7)
WBC NRBC COR # BLD: 17.96 10*3/MM3 (ref 4.5–10.7)
WBC NRBC COR # BLD: 18.34 10*3/MM3 (ref 4.5–10.7)
WBC NRBC COR # BLD: 20.25 10*3/MM3 (ref 4.5–10.7)
WBC NRBC COR # BLD: 20.61 10*3/MM3 (ref 4.5–10.7)
WBC NRBC COR # BLD: 21.95 10*3/MM3 (ref 4.5–10.7)
WBC NRBC COR # BLD: 22.15 10*3/MM3 (ref 4.5–10.7)
WBC NRBC COR # BLD: 9.37 10*3/MM3 (ref 4.5–10.7)
WBC UR QL AUTO: ABNORMAL /HPF
WBC UR QL AUTO: ABNORMAL /HPF
WHOLE BLOOD HOLD SPECIMEN: NORMAL
WHOLE BLOOD HOLD SPECIMEN: NORMAL

## 2017-01-01 PROCEDURE — 63710000001 DIPHENHYDRAMINE PER 50 MG: Performed by: INTERNAL MEDICINE

## 2017-01-01 PROCEDURE — 86334 IMMUNOFIX E-PHORESIS SERUM: CPT | Performed by: INTERNAL MEDICINE

## 2017-01-01 PROCEDURE — 96372 THER/PROPH/DIAG INJ SC/IM: CPT | Performed by: INTERNAL MEDICINE

## 2017-01-01 PROCEDURE — 85007 BL SMEAR W/DIFF WBC COUNT: CPT | Performed by: EMERGENCY MEDICINE

## 2017-01-01 PROCEDURE — 85730 THROMBOPLASTIN TIME PARTIAL: CPT | Performed by: SURGERY

## 2017-01-01 PROCEDURE — 85730 THROMBOPLASTIN TIME PARTIAL: CPT | Performed by: INTERNAL MEDICINE

## 2017-01-01 PROCEDURE — 80053 COMPREHEN METABOLIC PANEL: CPT | Performed by: INTERNAL MEDICINE

## 2017-01-01 PROCEDURE — 85046 RETICYTE/HGB CONCENTRATE: CPT | Performed by: INTERNAL MEDICINE

## 2017-01-01 PROCEDURE — 25010000002 HEPARIN (PORCINE) PER 1000 UNITS: Performed by: INTERNAL MEDICINE

## 2017-01-01 PROCEDURE — 85025 COMPLETE CBC W/AUTO DIFF WBC: CPT | Performed by: INTERNAL MEDICINE

## 2017-01-01 PROCEDURE — 71010 HC CHEST PA OR AP: CPT

## 2017-01-01 PROCEDURE — 25010000002 ENOXAPARIN PER 10 MG: Performed by: INTERNAL MEDICINE

## 2017-01-01 PROCEDURE — 63710000001 INSULIN ASPART PER 5 UNITS: Performed by: INTERNAL MEDICINE

## 2017-01-01 PROCEDURE — 95819 EEG AWAKE AND ASLEEP: CPT

## 2017-01-01 PROCEDURE — 87040 BLOOD CULTURE FOR BACTERIA: CPT | Performed by: INTERNAL MEDICINE

## 2017-01-01 PROCEDURE — 97110 THERAPEUTIC EXERCISES: CPT

## 2017-01-01 PROCEDURE — 83036 HEMOGLOBIN GLYCOSYLATED A1C: CPT | Performed by: INTERNAL MEDICINE

## 2017-01-01 PROCEDURE — 94010 BREATHING CAPACITY TEST: CPT

## 2017-01-01 PROCEDURE — 84466 ASSAY OF TRANSFERRIN: CPT | Performed by: INTERNAL MEDICINE

## 2017-01-01 PROCEDURE — 82962 GLUCOSE BLOOD TEST: CPT

## 2017-01-01 PROCEDURE — 86235 NUCLEAR ANTIGEN ANTIBODY: CPT | Performed by: INTERNAL MEDICINE

## 2017-01-01 PROCEDURE — 25010000002 CYANOCOBALAMIN PER 1000 MCG: Performed by: INTERNAL MEDICINE

## 2017-01-01 PROCEDURE — 85610 PROTHROMBIN TIME: CPT | Performed by: INTERNAL MEDICINE

## 2017-01-01 PROCEDURE — 99284 EMERGENCY DEPT VISIT MOD MDM: CPT

## 2017-01-01 PROCEDURE — 96375 TX/PRO/DX INJ NEW DRUG ADDON: CPT

## 2017-01-01 PROCEDURE — 87015 SPECIMEN INFECT AGNT CONCNTJ: CPT | Performed by: INTERNAL MEDICINE

## 2017-01-01 PROCEDURE — 83735 ASSAY OF MAGNESIUM: CPT | Performed by: INTERNAL MEDICINE

## 2017-01-01 PROCEDURE — 99231 SBSQ HOSP IP/OBS SF/LOW 25: CPT | Performed by: INTERNAL MEDICINE

## 2017-01-01 PROCEDURE — 87205 SMEAR GRAM STAIN: CPT | Performed by: INTERNAL MEDICINE

## 2017-01-01 PROCEDURE — 85598 HEXAGNAL PHOSPH PLTLT NEUTRL: CPT | Performed by: INTERNAL MEDICINE

## 2017-01-01 PROCEDURE — 25010000002 HYDROMORPHONE PER 4 MG: Performed by: INTERNAL MEDICINE

## 2017-01-01 PROCEDURE — 88185 FLOWCYTOMETRY/TC ADD-ON: CPT | Performed by: INTERNAL MEDICINE

## 2017-01-01 PROCEDURE — 81001 URINALYSIS AUTO W/SCOPE: CPT | Performed by: INTERNAL MEDICINE

## 2017-01-01 PROCEDURE — 81219 CALR GENE COM VARIANTS: CPT | Performed by: INTERNAL MEDICINE

## 2017-01-01 PROCEDURE — 85007 BL SMEAR W/DIFF WBC COUNT: CPT | Performed by: INTERNAL MEDICINE

## 2017-01-01 PROCEDURE — 70551 MRI BRAIN STEM W/O DYE: CPT

## 2017-01-01 PROCEDURE — 74177 CT ABD & PELVIS W/CONTRAST: CPT

## 2017-01-01 PROCEDURE — 25010000002 PROPOFOL 10 MG/ML EMULSION: Performed by: ANESTHESIOLOGY

## 2017-01-01 PROCEDURE — 25010000003 CEFTRIAXONE PER 250 MG: Performed by: INTERNAL MEDICINE

## 2017-01-01 PROCEDURE — 84157 ASSAY OF PROTEIN OTHER: CPT | Performed by: INTERNAL MEDICINE

## 2017-01-01 PROCEDURE — 25810000003 POTASSIUM CHLORIDE PER 2 MEQ: Performed by: INTERNAL MEDICINE

## 2017-01-01 PROCEDURE — 71250 CT THORAX DX C-: CPT

## 2017-01-01 PROCEDURE — 83690 ASSAY OF LIPASE: CPT | Performed by: INTERNAL MEDICINE

## 2017-01-01 PROCEDURE — 25010000002 HEPARIN (PORCINE) PER 1000 UNITS: Performed by: EMERGENCY MEDICINE

## 2017-01-01 PROCEDURE — 83605 ASSAY OF LACTIC ACID: CPT | Performed by: INTERNAL MEDICINE

## 2017-01-01 PROCEDURE — 86301 IMMUNOASSAY TUMOR CA 19-9: CPT | Performed by: INTERNAL MEDICINE

## 2017-01-01 PROCEDURE — 82570 ASSAY OF URINE CREATININE: CPT | Performed by: INTERNAL MEDICINE

## 2017-01-01 PROCEDURE — 99214 OFFICE O/P EST MOD 30 MIN: CPT | Performed by: INTERNAL MEDICINE

## 2017-01-01 PROCEDURE — 83615 LACTATE (LD) (LDH) ENZYME: CPT | Performed by: INTERNAL MEDICINE

## 2017-01-01 PROCEDURE — 99232 SBSQ HOSP IP/OBS MODERATE 35: CPT | Performed by: INTERNAL MEDICINE

## 2017-01-01 PROCEDURE — 80053 COMPREHEN METABOLIC PANEL: CPT | Performed by: EMERGENCY MEDICINE

## 2017-01-01 PROCEDURE — 96375 TX/PRO/DX INJ NEW DRUG ADDON: CPT | Performed by: INTERNAL MEDICINE

## 2017-01-01 PROCEDURE — 84153 ASSAY OF PSA TOTAL: CPT | Performed by: INTERNAL MEDICINE

## 2017-01-01 PROCEDURE — 36416 COLLJ CAPILLARY BLOOD SPEC: CPT

## 2017-01-01 PROCEDURE — 36415 COLL VENOUS BLD VENIPUNCTURE: CPT

## 2017-01-01 PROCEDURE — 84100 ASSAY OF PHOSPHORUS: CPT | Performed by: INTERNAL MEDICINE

## 2017-01-01 PROCEDURE — 85025 COMPLETE CBC W/AUTO DIFF WBC: CPT | Performed by: EMERGENCY MEDICINE

## 2017-01-01 PROCEDURE — 81270 JAK2 GENE: CPT | Performed by: INTERNAL MEDICINE

## 2017-01-01 PROCEDURE — 93010 ELECTROCARDIOGRAM REPORT: CPT | Performed by: INTERNAL MEDICINE

## 2017-01-01 PROCEDURE — 88305 TISSUE EXAM BY PATHOLOGIST: CPT | Performed by: INTERNAL MEDICINE

## 2017-01-01 PROCEDURE — 85046 RETICYTE/HGB CONCENTRATE: CPT

## 2017-01-01 PROCEDURE — 96374 THER/PROPH/DIAG INJ IV PUSH: CPT

## 2017-01-01 PROCEDURE — 25010000002 FERUMOXYTOL 510 MG/17ML SOLUTION 510 MG VIAL: Performed by: INTERNAL MEDICINE

## 2017-01-01 PROCEDURE — 83883 ASSAY NEPHELOMETRY NOT SPEC: CPT | Performed by: INTERNAL MEDICINE

## 2017-01-01 PROCEDURE — 85705 THROMBOPLASTIN INHIBITION: CPT | Performed by: INTERNAL MEDICINE

## 2017-01-01 PROCEDURE — 93005 ELECTROCARDIOGRAM TRACING: CPT | Performed by: INTERNAL MEDICINE

## 2017-01-01 PROCEDURE — 0W9G3ZX DRAINAGE OF PERITONEAL CAVITY, PERCUTANEOUS APPROACH, DIAGNOSTIC: ICD-10-PCS | Performed by: RADIOLOGY

## 2017-01-01 PROCEDURE — 83690 ASSAY OF LIPASE: CPT | Performed by: EMERGENCY MEDICINE

## 2017-01-01 PROCEDURE — 99291 CRITICAL CARE FIRST HOUR: CPT

## 2017-01-01 PROCEDURE — 88189 FLOWCYTOMETRY/READ 16 & >: CPT | Performed by: INTERNAL MEDICINE

## 2017-01-01 PROCEDURE — 85025 COMPLETE CBC W/AUTO DIFF WBC: CPT

## 2017-01-01 PROCEDURE — 76942 ECHO GUIDE FOR BIOPSY: CPT

## 2017-01-01 PROCEDURE — 85732 THROMBOPLASTIN TIME PARTIAL: CPT | Performed by: INTERNAL MEDICINE

## 2017-01-01 PROCEDURE — 86140 C-REACTIVE PROTEIN: CPT | Performed by: INTERNAL MEDICINE

## 2017-01-01 PROCEDURE — 0 IOPAMIDOL 61 % SOLUTION: Performed by: EMERGENCY MEDICINE

## 2017-01-01 PROCEDURE — 94799 UNLISTED PULMONARY SVC/PX: CPT

## 2017-01-01 PROCEDURE — 0 DIATRIZOATE MEGLUMINE & SODIUM PER 1 ML: Performed by: INTERNAL MEDICINE

## 2017-01-01 PROCEDURE — 93923 UPR/LXTR ART STDY 3+ LVLS: CPT

## 2017-01-01 PROCEDURE — 93979 VASCULAR STUDY: CPT

## 2017-01-01 PROCEDURE — 86225 DNA ANTIBODY NATIVE: CPT | Performed by: INTERNAL MEDICINE

## 2017-01-01 PROCEDURE — 99222 1ST HOSP IP/OBS MODERATE 55: CPT | Performed by: INTERNAL MEDICINE

## 2017-01-01 PROCEDURE — 88112 CYTOPATH CELL ENHANCE TECH: CPT | Performed by: INTERNAL MEDICINE

## 2017-01-01 PROCEDURE — 74176 CT ABD & PELVIS W/O CONTRAST: CPT

## 2017-01-01 PROCEDURE — 82607 VITAMIN B-12: CPT | Performed by: INTERNAL MEDICINE

## 2017-01-01 PROCEDURE — 87070 CULTURE OTHR SPECIMN AEROBIC: CPT | Performed by: INTERNAL MEDICINE

## 2017-01-01 PROCEDURE — 85611 PROTHROMBIN TEST: CPT | Performed by: INTERNAL MEDICINE

## 2017-01-01 PROCEDURE — 71020 HC CHEST PA AND LATERAL: CPT

## 2017-01-01 PROCEDURE — 99223 1ST HOSP IP/OBS HIGH 75: CPT | Performed by: INTERNAL MEDICINE

## 2017-01-01 PROCEDURE — 82140 ASSAY OF AMMONIA: CPT | Performed by: INTERNAL MEDICINE

## 2017-01-01 PROCEDURE — 85613 RUSSELL VIPER VENOM DILUTED: CPT | Performed by: INTERNAL MEDICINE

## 2017-01-01 PROCEDURE — 82378 CARCINOEMBRYONIC ANTIGEN: CPT | Performed by: INTERNAL MEDICINE

## 2017-01-01 PROCEDURE — 99233 SBSQ HOSP IP/OBS HIGH 50: CPT | Performed by: INTERNAL MEDICINE

## 2017-01-01 PROCEDURE — 80048 BASIC METABOLIC PNL TOTAL CA: CPT | Performed by: INTERNAL MEDICINE

## 2017-01-01 PROCEDURE — G0378 HOSPITAL OBSERVATION PER HR: HCPCS

## 2017-01-01 PROCEDURE — 89051 BODY FLUID CELL COUNT: CPT | Performed by: INTERNAL MEDICINE

## 2017-01-01 PROCEDURE — 83540 ASSAY OF IRON: CPT | Performed by: INTERNAL MEDICINE

## 2017-01-01 PROCEDURE — 96374 THER/PROPH/DIAG INJ IV PUSH: CPT | Performed by: INTERNAL MEDICINE

## 2017-01-01 PROCEDURE — 95909 NRV CNDJ TST 5-6 STUDIES: CPT

## 2017-01-01 PROCEDURE — 88184 FLOWCYTOMETRY/ TC 1 MARKER: CPT | Performed by: INTERNAL MEDICINE

## 2017-01-01 PROCEDURE — 85670 THROMBIN TIME PLASMA: CPT | Performed by: INTERNAL MEDICINE

## 2017-01-01 PROCEDURE — 82728 ASSAY OF FERRITIN: CPT | Performed by: INTERNAL MEDICINE

## 2017-01-01 PROCEDURE — 94729 DIFFUSING CAPACITY: CPT

## 2017-01-01 PROCEDURE — 95816 EEG AWAKE AND DROWSY: CPT | Performed by: PSYCHIATRY & NEUROLOGY

## 2017-01-01 PROCEDURE — 0DJ08ZZ INSPECTION OF UPPER INTESTINAL TRACT, VIA NATURAL OR ARTIFICIAL OPENING ENDOSCOPIC: ICD-10-PCS | Performed by: INTERNAL MEDICINE

## 2017-01-01 PROCEDURE — 88291 CYTO/MOLECULAR REPORT: CPT | Performed by: INTERNAL MEDICINE

## 2017-01-01 PROCEDURE — 63710000001 INSULIN DETEMER PER 5 UNITS: Performed by: INTERNAL MEDICINE

## 2017-01-01 PROCEDURE — 84300 ASSAY OF URINE SODIUM: CPT | Performed by: INTERNAL MEDICINE

## 2017-01-01 PROCEDURE — 97162 PT EVAL MOD COMPLEX 30 MIN: CPT

## 2017-01-01 PROCEDURE — 88271 CYTOGENETICS DNA PROBE: CPT | Performed by: INTERNAL MEDICINE

## 2017-01-01 PROCEDURE — 36415 COLL VENOUS BLD VENIPUNCTURE: CPT | Performed by: INTERNAL MEDICINE

## 2017-01-01 PROCEDURE — 84550 ASSAY OF BLOOD/URIC ACID: CPT | Performed by: INTERNAL MEDICINE

## 2017-01-01 PROCEDURE — 81402 MOPATH PROCEDURE LEVEL 3: CPT

## 2017-01-01 PROCEDURE — 88275 CYTOGENETICS 100-300: CPT | Performed by: INTERNAL MEDICINE

## 2017-01-01 PROCEDURE — 99222 1ST HOSP IP/OBS MODERATE 55: CPT

## 2017-01-01 PROCEDURE — 43235 EGD DIAGNOSTIC BRUSH WASH: CPT

## 2017-01-01 PROCEDURE — 84155 ASSAY OF PROTEIN SERUM: CPT | Performed by: INTERNAL MEDICINE

## 2017-01-01 PROCEDURE — 93306 TTE W/DOPPLER COMPLETE: CPT | Performed by: INTERNAL MEDICINE

## 2017-01-01 PROCEDURE — 97110 THERAPEUTIC EXERCISES: CPT | Performed by: PHYSICAL THERAPIST

## 2017-01-01 PROCEDURE — 99231 SBSQ HOSP IP/OBS SF/LOW 25: CPT | Performed by: SURGERY

## 2017-01-01 PROCEDURE — 94726 PLETHYSMOGRAPHY LUNG VOLUMES: CPT

## 2017-01-01 PROCEDURE — 93005 ELECTROCARDIOGRAM TRACING: CPT | Performed by: EMERGENCY MEDICINE

## 2017-01-01 PROCEDURE — 84145 PROCALCITONIN (PCT): CPT | Performed by: INTERNAL MEDICINE

## 2017-01-01 PROCEDURE — 93306 TTE W/DOPPLER COMPLETE: CPT

## 2017-01-01 PROCEDURE — 99213 OFFICE O/P EST LOW 20 MIN: CPT | Performed by: INTERNAL MEDICINE

## 2017-01-01 PROCEDURE — 84443 ASSAY THYROID STIM HORMONE: CPT | Performed by: INTERNAL MEDICINE

## 2017-01-01 PROCEDURE — 88237 TISSUE CULTURE BONE MARROW: CPT | Performed by: INTERNAL MEDICINE

## 2017-01-01 PROCEDURE — 95886 MUSC TEST DONE W/N TEST COMP: CPT

## 2017-01-01 PROCEDURE — 99232 SBSQ HOSP IP/OBS MODERATE 35: CPT

## 2017-01-01 PROCEDURE — 82105 ALPHA-FETOPROTEIN SERUM: CPT | Performed by: INTERNAL MEDICINE

## 2017-01-01 PROCEDURE — 25010000002 CEFTRIAXONE PER 250 MG: Performed by: INTERNAL MEDICINE

## 2017-01-01 PROCEDURE — 85652 RBC SED RATE AUTOMATED: CPT | Performed by: INTERNAL MEDICINE

## 2017-01-01 PROCEDURE — 25010000002 ONDANSETRON PER 1 MG: Performed by: INTERNAL MEDICINE

## 2017-01-01 PROCEDURE — 93970 EXTREMITY STUDY: CPT

## 2017-01-01 PROCEDURE — 84165 PROTEIN E-PHORESIS SERUM: CPT | Performed by: INTERNAL MEDICINE

## 2017-01-01 PROCEDURE — 81402 MOPATH PROCEDURE LEVEL 3: CPT | Performed by: INTERNAL MEDICINE

## 2017-01-01 PROCEDURE — 97161 PT EVAL LOW COMPLEX 20 MIN: CPT

## 2017-01-01 PROCEDURE — 81001 URINALYSIS AUTO W/SCOPE: CPT | Performed by: EMERGENCY MEDICINE

## 2017-01-01 RX ORDER — CYANOCOBALAMIN 1000 UG/ML
1000 INJECTION, SOLUTION INTRAMUSCULAR; SUBCUTANEOUS ONCE
Status: COMPLETED | OUTPATIENT
Start: 2017-01-01 | End: 2017-01-01

## 2017-01-01 RX ORDER — LEVETIRACETAM 500 MG/1
500 TABLET ORAL 2 TIMES DAILY
Status: DISCONTINUED | OUTPATIENT
Start: 2017-01-01 | End: 2017-01-01 | Stop reason: HOSPADM

## 2017-01-01 RX ORDER — HEPARIN SODIUM 5000 [USP'U]/ML
40-80 INJECTION, SOLUTION INTRAVENOUS; SUBCUTANEOUS EVERY 6 HOURS PRN
Status: DISCONTINUED | OUTPATIENT
Start: 2017-01-01 | End: 2017-01-01

## 2017-01-01 RX ORDER — DIPHENHYDRAMINE HCL 25 MG
25 CAPSULE ORAL ONCE
Status: CANCELLED | OUTPATIENT
Start: 2017-01-01

## 2017-01-01 RX ORDER — LORAZEPAM 2 MG/ML
2 CONCENTRATE ORAL
Status: DISCONTINUED | OUTPATIENT
Start: 2017-01-01 | End: 2017-07-29 | Stop reason: HOSPADM

## 2017-01-01 RX ORDER — MORPHINE SULFATE 2 MG/ML
1 INJECTION, SOLUTION INTRAMUSCULAR; INTRAVENOUS EVERY 4 HOURS PRN
Status: DISCONTINUED | OUTPATIENT
Start: 2017-01-01 | End: 2017-01-01 | Stop reason: HOSPADM

## 2017-01-01 RX ORDER — HYDROCODONE BITARTRATE AND ACETAMINOPHEN 5; 325 MG/1; MG/1
1 TABLET ORAL EVERY 4 HOURS PRN
Status: DISCONTINUED | OUTPATIENT
Start: 2017-01-01 | End: 2017-01-01 | Stop reason: HOSPADM

## 2017-01-01 RX ORDER — LORAZEPAM 2 MG/ML
1 INJECTION INTRAMUSCULAR
Status: DISCONTINUED | OUTPATIENT
Start: 2017-01-01 | End: 2017-07-29 | Stop reason: HOSPADM

## 2017-01-01 RX ORDER — LORAZEPAM 0.5 MG/1
0.5 TABLET ORAL
Status: DISCONTINUED | OUTPATIENT
Start: 2017-01-01 | End: 2017-07-29 | Stop reason: HOSPADM

## 2017-01-01 RX ORDER — LEVETIRACETAM 500 MG/1
500 TABLET ORAL EVERY 12 HOURS SCHEDULED
Status: DISCONTINUED | OUTPATIENT
Start: 2017-01-01 | End: 2017-01-01

## 2017-01-01 RX ORDER — PANTOPRAZOLE SODIUM 40 MG/1
40 TABLET, DELAYED RELEASE ORAL
Status: CANCELLED | OUTPATIENT
Start: 2017-01-01

## 2017-01-01 RX ORDER — LORAZEPAM 2 MG/ML
0.5 INJECTION INTRAMUSCULAR
Status: DISCONTINUED | OUTPATIENT
Start: 2017-01-01 | End: 2017-07-29 | Stop reason: HOSPADM

## 2017-01-01 RX ORDER — SODIUM CHLORIDE 0.9 % (FLUSH) 0.9 %
1-10 SYRINGE (ML) INJECTION AS NEEDED
Status: DISCONTINUED | OUTPATIENT
Start: 2017-01-01 | End: 2017-07-29 | Stop reason: HOSPADM

## 2017-01-01 RX ORDER — SODIUM CHLORIDE 9 MG/ML
250 INJECTION, SOLUTION INTRAVENOUS ONCE
Status: COMPLETED | OUTPATIENT
Start: 2017-01-01 | End: 2017-01-01

## 2017-01-01 RX ORDER — NITROGLYCERIN 0.4 MG/1
0.4 TABLET SUBLINGUAL
Status: DISCONTINUED | OUTPATIENT
Start: 2017-01-01 | End: 2017-07-29 | Stop reason: HOSPADM

## 2017-01-01 RX ORDER — DONEPEZIL HYDROCHLORIDE 5 MG/1
5 TABLET, FILM COATED ORAL NIGHTLY
Status: CANCELLED | OUTPATIENT
Start: 2017-01-01

## 2017-01-01 RX ORDER — CILOSTAZOL 50 MG/1
TABLET ORAL
COMMUNITY
Start: 2017-01-01 | End: 2017-01-01 | Stop reason: HOSPADM

## 2017-01-01 RX ORDER — CEFTRIAXONE SODIUM 2 G/50ML
2 INJECTION, SOLUTION INTRAVENOUS EVERY 24 HOURS
Status: DISCONTINUED | OUTPATIENT
Start: 2017-01-01 | End: 2017-01-01

## 2017-01-01 RX ORDER — ONDANSETRON 2 MG/ML
4 INJECTION INTRAMUSCULAR; INTRAVENOUS EVERY 6 HOURS PRN
Status: DISCONTINUED | OUTPATIENT
Start: 2017-01-01 | End: 2017-07-29 | Stop reason: HOSPADM

## 2017-01-01 RX ORDER — DIPHENHYDRAMINE HCL 25 MG
25 CAPSULE ORAL ONCE
Status: COMPLETED | OUTPATIENT
Start: 2017-01-01 | End: 2017-01-01

## 2017-01-01 RX ORDER — INSULIN GLARGINE 100 [IU]/ML
12 INJECTION, SOLUTION SUBCUTANEOUS
COMMUNITY
End: 2017-01-01

## 2017-01-01 RX ORDER — CYANOCOBALAMIN 1000 UG/ML
1000 INJECTION, SOLUTION INTRAMUSCULAR; SUBCUTANEOUS ONCE
Status: CANCELLED | OUTPATIENT
Start: 2017-01-01

## 2017-01-01 RX ORDER — DEXTROSE, SODIUM CHLORIDE, AND POTASSIUM CHLORIDE 5; .45; .15 G/100ML; G/100ML; G/100ML
100 INJECTION INTRAVENOUS CONTINUOUS
Status: DISCONTINUED | OUTPATIENT
Start: 2017-01-01 | End: 2017-01-01

## 2017-01-01 RX ORDER — CHOLECALCIFEROL (VITAMIN D3) 125 MCG
5 CAPSULE ORAL NIGHTLY PRN
COMMUNITY

## 2017-01-01 RX ORDER — LEVETIRACETAM 500 MG/1
500 TABLET ORAL 2 TIMES DAILY
Status: CANCELLED | OUTPATIENT
Start: 2017-01-01

## 2017-01-01 RX ORDER — HEPARIN SODIUM 5000 [USP'U]/ML
80 INJECTION, SOLUTION INTRAVENOUS; SUBCUTANEOUS ONCE
Status: COMPLETED | OUTPATIENT
Start: 2017-01-01 | End: 2017-01-01

## 2017-01-01 RX ORDER — VALSARTAN 160 MG/1
160 TABLET ORAL
Status: DISCONTINUED | OUTPATIENT
Start: 2017-01-01 | End: 2017-01-01 | Stop reason: HOSPADM

## 2017-01-01 RX ORDER — HYDROMORPHONE HYDROCHLORIDE 1 MG/ML
0.5 INJECTION, SOLUTION INTRAMUSCULAR; INTRAVENOUS; SUBCUTANEOUS
Status: DISCONTINUED | OUTPATIENT
Start: 2017-01-01 | End: 2017-07-29 | Stop reason: HOSPADM

## 2017-01-01 RX ORDER — DIPHENOXYLATE HYDROCHLORIDE AND ATROPINE SULFATE 2.5; .025 MG/1; MG/1
1 TABLET ORAL
Status: DISCONTINUED | OUTPATIENT
Start: 2017-01-01 | End: 2017-07-29 | Stop reason: HOSPADM

## 2017-01-01 RX ORDER — LIDOCAINE HYDROCHLORIDE 10 MG/ML
0.5 INJECTION, SOLUTION INFILTRATION; PERINEURAL ONCE AS NEEDED
Status: DISCONTINUED | OUTPATIENT
Start: 2017-01-01 | End: 2017-01-01

## 2017-01-01 RX ORDER — NALOXONE HCL 0.4 MG/ML
0.4 VIAL (ML) INJECTION
Status: DISCONTINUED | OUTPATIENT
Start: 2017-01-01 | End: 2017-01-01 | Stop reason: HOSPADM

## 2017-01-01 RX ORDER — WARFARIN SODIUM 5 MG/1
5 TABLET ORAL
Status: DISCONTINUED | OUTPATIENT
Start: 2017-01-01 | End: 2017-01-01

## 2017-01-01 RX ORDER — SODIUM CHLORIDE 0.9 % (FLUSH) 0.9 %
3 SYRINGE (ML) INJECTION AS NEEDED
Status: DISCONTINUED | OUTPATIENT
Start: 2017-01-01 | End: 2017-01-01

## 2017-01-01 RX ORDER — DONEPEZIL HYDROCHLORIDE 5 MG/1
TABLET, FILM COATED ORAL
COMMUNITY
Start: 2017-01-01 | End: 2017-01-01

## 2017-01-01 RX ORDER — NALOXONE HCL 0.4 MG/ML
0.4 VIAL (ML) INJECTION
Status: CANCELLED | OUTPATIENT
Start: 2017-01-01

## 2017-01-01 RX ORDER — DONEPEZIL HYDROCHLORIDE 5 MG/1
5 TABLET, FILM COATED ORAL NIGHTLY
Status: DISCONTINUED | OUTPATIENT
Start: 2017-01-01 | End: 2017-01-01 | Stop reason: HOSPADM

## 2017-01-01 RX ORDER — OXYBUTYNIN CHLORIDE 5 MG/1
5 TABLET ORAL DAILY
Status: DISCONTINUED | OUTPATIENT
Start: 2017-01-01 | End: 2017-01-01 | Stop reason: HOSPADM

## 2017-01-01 RX ORDER — PANTOPRAZOLE SODIUM 40 MG/1
40 TABLET, DELAYED RELEASE ORAL
Status: DISCONTINUED | OUTPATIENT
Start: 2017-01-01 | End: 2017-07-29 | Stop reason: HOSPADM

## 2017-01-01 RX ORDER — LORAZEPAM 1 MG/1
1 TABLET ORAL
Status: DISCONTINUED | OUTPATIENT
Start: 2017-01-01 | End: 2017-07-29 | Stop reason: HOSPADM

## 2017-01-01 RX ORDER — TRAZODONE HYDROCHLORIDE 50 MG/1
50 TABLET ORAL NIGHTLY
Status: DISCONTINUED | OUTPATIENT
Start: 2017-01-01 | End: 2017-01-01 | Stop reason: HOSPADM

## 2017-01-01 RX ORDER — SODIUM CHLORIDE 9 MG/ML
250 INJECTION, SOLUTION INTRAVENOUS ONCE
Status: CANCELLED | OUTPATIENT
Start: 2017-01-01

## 2017-01-01 RX ORDER — TELMISARTAN AND HYDROCHLORTHIAZIDE 80; 12.5 MG/1; MG/1
1 TABLET ORAL
COMMUNITY
End: 2017-01-01

## 2017-01-01 RX ORDER — WARFARIN SODIUM 1 MG/1
0.5 TABLET ORAL
Qty: 30 TABLET | Refills: 6 | Status: SHIPPED | OUTPATIENT
Start: 2017-01-01

## 2017-01-01 RX ORDER — SACCHAROMYCES BOULARDII 250 MG
250 CAPSULE ORAL 2 TIMES DAILY
Status: DISCONTINUED | OUTPATIENT
Start: 2017-01-01 | End: 2017-01-01

## 2017-01-01 RX ORDER — OXYBUTYNIN CHLORIDE 5 MG/1
5 TABLET ORAL DAILY
Status: CANCELLED | OUTPATIENT
Start: 2017-01-01

## 2017-01-01 RX ORDER — SODIUM CHLORIDE 0.9 % (FLUSH) 0.9 %
10 SYRINGE (ML) INJECTION AS NEEDED
Status: DISCONTINUED | OUTPATIENT
Start: 2017-01-01 | End: 2017-01-01 | Stop reason: HOSPADM

## 2017-01-01 RX ORDER — CEFTRIAXONE SODIUM 1 G/50ML
1 INJECTION, SOLUTION INTRAVENOUS EVERY 24 HOURS
Status: DISCONTINUED | OUTPATIENT
Start: 2017-01-01 | End: 2017-01-01

## 2017-01-01 RX ORDER — NICOTINE POLACRILEX 4 MG
15 LOZENGE BUCCAL
Status: DISCONTINUED | OUTPATIENT
Start: 2017-01-01 | End: 2017-01-01 | Stop reason: HOSPADM

## 2017-01-01 RX ORDER — CARVEDILOL 12.5 MG/1
TABLET ORAL
COMMUNITY
Start: 2017-01-01 | End: 2017-01-01 | Stop reason: HOSPADM

## 2017-01-01 RX ORDER — LIDOCAINE HYDROCHLORIDE 10 MG/ML
20 INJECTION, SOLUTION INFILTRATION; PERINEURAL ONCE
Status: COMPLETED | OUTPATIENT
Start: 2017-01-01 | End: 2017-01-01

## 2017-01-01 RX ORDER — LEVETIRACETAM 500 MG/1
500 TABLET ORAL 2 TIMES DAILY
Qty: 60 TABLET | Refills: 11 | Status: SHIPPED | OUTPATIENT
Start: 2017-01-01

## 2017-01-01 RX ORDER — LORAZEPAM 2 MG/ML
0.5 CONCENTRATE ORAL
Status: DISCONTINUED | OUTPATIENT
Start: 2017-01-01 | End: 2017-07-29 | Stop reason: HOSPADM

## 2017-01-01 RX ORDER — DEXTROSE MONOHYDRATE 25 G/50ML
25 INJECTION, SOLUTION INTRAVENOUS
Status: DISCONTINUED | OUTPATIENT
Start: 2017-01-01 | End: 2017-01-01 | Stop reason: HOSPADM

## 2017-01-01 RX ORDER — SODIUM CHLORIDE 450 MG/100ML
100 INJECTION, SOLUTION INTRAVENOUS CONTINUOUS
Status: DISCONTINUED | OUTPATIENT
Start: 2017-01-01 | End: 2017-01-01

## 2017-01-01 RX ORDER — PROPOFOL 10 MG/ML
VIAL (ML) INTRAVENOUS AS NEEDED
Status: DISCONTINUED | OUTPATIENT
Start: 2017-01-01 | End: 2017-01-01 | Stop reason: SURG

## 2017-01-01 RX ORDER — SODIUM CHLORIDE AND POTASSIUM CHLORIDE 150; 450 MG/100ML; MG/100ML
100 INJECTION, SOLUTION INTRAVENOUS CONTINUOUS
Status: DISCONTINUED | OUTPATIENT
Start: 2017-01-01 | End: 2017-01-01

## 2017-01-01 RX ORDER — NICOTINE POLACRILEX 4 MG
15 LOZENGE BUCCAL
Qty: 5 EACH | Refills: 6 | Status: SHIPPED | OUTPATIENT
Start: 2017-01-01

## 2017-01-01 RX ORDER — PANTOPRAZOLE SODIUM 40 MG/1
40 TABLET, DELAYED RELEASE ORAL DAILY
Qty: 30 TABLET | Refills: 11 | Status: SHIPPED | OUTPATIENT
Start: 2017-01-01

## 2017-01-01 RX ORDER — VALSARTAN 160 MG/1
160 TABLET ORAL DAILY
Status: DISCONTINUED | OUTPATIENT
Start: 2017-01-01 | End: 2017-01-01

## 2017-01-01 RX ORDER — DEXTROSE, SODIUM CHLORIDE, AND POTASSIUM CHLORIDE 5; .45; .15 G/100ML; G/100ML; G/100ML
100 INJECTION INTRAVENOUS CONTINUOUS
Status: CANCELLED | OUTPATIENT
Start: 2017-01-01

## 2017-01-01 RX ORDER — PANTOPRAZOLE SODIUM 40 MG/1
40 TABLET, DELAYED RELEASE ORAL
Status: DISCONTINUED | OUTPATIENT
Start: 2017-01-01 | End: 2017-01-01 | Stop reason: HOSPADM

## 2017-01-01 RX ORDER — OXYBUTYNIN CHLORIDE 5 MG/1
5 TABLET ORAL DAILY
Qty: 30 TABLET | Refills: 11 | Status: SHIPPED | OUTPATIENT
Start: 2017-01-01

## 2017-01-01 RX ORDER — TRAZODONE HYDROCHLORIDE 50 MG/1
50 TABLET ORAL NIGHTLY
Qty: 30 TABLET | Refills: 11 | Status: SHIPPED | OUTPATIENT
Start: 2017-01-01

## 2017-01-01 RX ORDER — VALSARTAN 160 MG/1
160 TABLET ORAL
Status: CANCELLED | OUTPATIENT
Start: 2017-01-01

## 2017-01-01 RX ORDER — LORAZEPAM 2 MG/ML
2 INJECTION INTRAMUSCULAR
Status: DISCONTINUED | OUTPATIENT
Start: 2017-01-01 | End: 2017-07-29 | Stop reason: HOSPADM

## 2017-01-01 RX ORDER — SODIUM CHLORIDE, SODIUM LACTATE, POTASSIUM CHLORIDE, CALCIUM CHLORIDE 600; 310; 30; 20 MG/100ML; MG/100ML; MG/100ML; MG/100ML
1000 INJECTION, SOLUTION INTRAVENOUS CONTINUOUS PRN
Status: DISCONTINUED | OUTPATIENT
Start: 2017-01-01 | End: 2017-01-01

## 2017-01-01 RX ORDER — HYDROCODONE BITARTRATE AND ACETAMINOPHEN 5; 325 MG/1; MG/1
1 TABLET ORAL EVERY 4 HOURS PRN
Status: CANCELLED | OUTPATIENT
Start: 2017-01-01 | End: 2017-01-01

## 2017-01-01 RX ORDER — WARFARIN SODIUM 2.5 MG/1
2.5 TABLET ORAL
Status: DISCONTINUED | OUTPATIENT
Start: 2017-01-01 | End: 2017-01-01

## 2017-01-01 RX ORDER — SODIUM CHLORIDE 0.9 % (FLUSH) 0.9 %
1-10 SYRINGE (ML) INJECTION AS NEEDED
Status: CANCELLED | OUTPATIENT
Start: 2017-01-01

## 2017-01-01 RX ORDER — VALSARTAN 160 MG/1
160 TABLET ORAL
Qty: 30 TABLET | Refills: 11 | Status: SHIPPED | OUTPATIENT
Start: 2017-01-01

## 2017-01-01 RX ORDER — MORPHINE SULFATE 2 MG/ML
1 INJECTION, SOLUTION INTRAMUSCULAR; INTRAVENOUS EVERY 4 HOURS PRN
Status: CANCELLED | OUTPATIENT
Start: 2017-01-01 | End: 2017-01-01

## 2017-01-01 RX ORDER — WARFARIN SODIUM 3 MG/1
3 TABLET ORAL
COMMUNITY
End: 2017-01-01

## 2017-01-01 RX ORDER — NICOTINE POLACRILEX 4 MG
15 LOZENGE BUCCAL
Status: DISCONTINUED | OUTPATIENT
Start: 2017-01-01 | End: 2017-07-29 | Stop reason: HOSPADM

## 2017-01-01 RX ORDER — LORAZEPAM 2 MG/ML
1 CONCENTRATE ORAL
Status: DISCONTINUED | OUTPATIENT
Start: 2017-01-01 | End: 2017-07-29 | Stop reason: HOSPADM

## 2017-01-01 RX ORDER — ACETAMINOPHEN 650 MG/1
650 SUPPOSITORY RECTAL EVERY 4 HOURS PRN
Status: DISCONTINUED | OUTPATIENT
Start: 2017-01-01 | End: 2017-07-29 | Stop reason: HOSPADM

## 2017-01-01 RX ORDER — CLOTRIMAZOLE AND BETAMETHASONE DIPROPIONATE 10; .64 MG/G; MG/G
CREAM TOPICAL
COMMUNITY
Start: 2017-01-01 | End: 2017-01-01 | Stop reason: HOSPADM

## 2017-01-01 RX ORDER — ACETAMINOPHEN 325 MG/1
650 TABLET ORAL EVERY 4 HOURS PRN
Status: DISCONTINUED | OUTPATIENT
Start: 2017-01-01 | End: 2017-07-29 | Stop reason: HOSPADM

## 2017-01-01 RX ORDER — MELOXICAM 7.5 MG/1
7.5 TABLET ORAL
COMMUNITY
End: 2017-01-01

## 2017-01-01 RX ORDER — LOPERAMIDE HYDROCHLORIDE 2 MG/1
2 CAPSULE ORAL 4 TIMES DAILY PRN
Status: DISCONTINUED | OUTPATIENT
Start: 2017-01-01 | End: 2017-07-29 | Stop reason: HOSPADM

## 2017-01-01 RX ORDER — NADOLOL 20 MG/1
20 TABLET ORAL
Qty: 30 TABLET | Refills: 11 | Status: SHIPPED | OUTPATIENT
Start: 2017-01-01

## 2017-01-01 RX ORDER — PROPOFOL 10 MG/ML
VIAL (ML) INTRAVENOUS CONTINUOUS PRN
Status: DISCONTINUED | OUTPATIENT
Start: 2017-01-01 | End: 2017-01-01 | Stop reason: SURG

## 2017-01-01 RX ORDER — TRAZODONE HYDROCHLORIDE 50 MG/1
50 TABLET ORAL NIGHTLY
Status: CANCELLED | OUTPATIENT
Start: 2017-01-01

## 2017-01-01 RX ORDER — ACETAMINOPHEN 160 MG/5ML
650 SOLUTION ORAL EVERY 4 HOURS PRN
Status: DISCONTINUED | OUTPATIENT
Start: 2017-01-01 | End: 2017-07-29 | Stop reason: HOSPADM

## 2017-01-01 RX ORDER — SODIUM CHLORIDE 9 MG/ML
75 INJECTION, SOLUTION INTRAVENOUS CONTINUOUS
Status: DISCONTINUED | OUTPATIENT
Start: 2017-01-01 | End: 2017-01-01

## 2017-01-01 RX ORDER — ATORVASTATIN CALCIUM 10 MG/1
10 TABLET, FILM COATED ORAL DAILY
Status: DISCONTINUED | OUTPATIENT
Start: 2017-01-01 | End: 2017-01-01

## 2017-01-01 RX ORDER — DEXTROSE MONOHYDRATE 25 G/50ML
25 INJECTION, SOLUTION INTRAVENOUS
Status: DISCONTINUED | OUTPATIENT
Start: 2017-01-01 | End: 2017-07-29 | Stop reason: HOSPADM

## 2017-01-01 RX ORDER — SODIUM CHLORIDE 0.9 % (FLUSH) 0.9 %
1-10 SYRINGE (ML) INJECTION AS NEEDED
Status: DISCONTINUED | OUTPATIENT
Start: 2017-01-01 | End: 2017-01-01 | Stop reason: HOSPADM

## 2017-01-01 RX ORDER — CEFTRIAXONE SODIUM 2 G/50ML
2 INJECTION, SOLUTION INTRAVENOUS ONCE
Status: COMPLETED | OUTPATIENT
Start: 2017-01-01 | End: 2017-01-01

## 2017-01-01 RX ORDER — TRAZODONE HYDROCHLORIDE 50 MG/1
50 TABLET ORAL NIGHTLY
Status: DISCONTINUED | OUTPATIENT
Start: 2017-01-01 | End: 2017-07-29 | Stop reason: HOSPADM

## 2017-01-01 RX ORDER — NADOLOL 40 MG/1
20 TABLET ORAL
Status: DISCONTINUED | OUTPATIENT
Start: 2017-01-01 | End: 2017-01-01

## 2017-01-01 RX ORDER — WARFARIN SODIUM 1 MG/1
1 TABLET ORAL
Status: COMPLETED | OUTPATIENT
Start: 2017-01-01 | End: 2017-01-01

## 2017-01-01 RX ORDER — WARFARIN SODIUM 4 MG/1
4 TABLET ORAL
Status: DISCONTINUED | OUTPATIENT
Start: 2017-01-01 | End: 2017-01-01

## 2017-01-01 RX ORDER — HYDROCODONE BITARTRATE AND ACETAMINOPHEN 10; 325 MG/1; MG/1
1 TABLET ORAL EVERY 6 HOURS PRN
Status: DISCONTINUED | OUTPATIENT
Start: 2017-01-01 | End: 2017-07-29 | Stop reason: HOSPADM

## 2017-01-01 RX ORDER — DONEPEZIL HYDROCHLORIDE 5 MG/1
5 TABLET, FILM COATED ORAL NIGHTLY
Qty: 30 TABLET | Refills: 11 | Status: SHIPPED | OUTPATIENT
Start: 2017-01-01

## 2017-01-01 RX ORDER — NADOLOL 20 MG/1
20 TABLET ORAL
Status: DISCONTINUED | OUTPATIENT
Start: 2017-01-01 | End: 2017-01-01 | Stop reason: HOSPADM

## 2017-01-01 RX ORDER — WARFARIN SODIUM 1 MG/1
0.5 TABLET ORAL
Status: COMPLETED | OUTPATIENT
Start: 2017-01-01 | End: 2017-01-01

## 2017-01-01 RX ORDER — CYANOCOBALAMIN 1000 UG/ML
1000 INJECTION, SOLUTION INTRAMUSCULAR; SUBCUTANEOUS ONCE
OUTPATIENT
Start: 2017-01-01

## 2017-01-01 RX ORDER — WARFARIN SODIUM 2.5 MG/1
2.5 TABLET ORAL
Status: COMPLETED | OUTPATIENT
Start: 2017-01-01 | End: 2017-01-01

## 2017-01-01 RX ORDER — HEPARIN SODIUM 5000 [USP'U]/ML
40-80 INJECTION, SOLUTION INTRAVENOUS; SUBCUTANEOUS EVERY 6 HOURS PRN
Status: DISPENSED | OUTPATIENT
Start: 2017-01-01 | End: 2017-01-01

## 2017-01-01 RX ORDER — LORAZEPAM 1 MG/1
2 TABLET ORAL
Status: DISCONTINUED | OUTPATIENT
Start: 2017-01-01 | End: 2017-07-29 | Stop reason: HOSPADM

## 2017-01-01 RX ADMIN — INSULIN ASPART 3 UNITS: 100 INJECTION, SOLUTION INTRAVENOUS; SUBCUTANEOUS at 09:12

## 2017-01-01 RX ADMIN — SODIUM CHLORIDE 30 ML/HR: 4.5 INJECTION, SOLUTION INTRAVENOUS at 21:29

## 2017-01-01 RX ADMIN — LEVETIRACETAM 500 MG: 500 TABLET, FILM COATED ORAL at 09:35

## 2017-01-01 RX ADMIN — OXYBUTYNIN CHLORIDE 5 MG: 5 TABLET ORAL at 09:00

## 2017-01-01 RX ADMIN — TRAZODONE HYDROCHLORIDE 50 MG: 50 TABLET ORAL at 20:58

## 2017-01-01 RX ADMIN — WARFARIN SODIUM 2.5 MG: 2.5 TABLET ORAL at 19:09

## 2017-01-01 RX ADMIN — POTASSIUM CHLORIDE, DEXTROSE MONOHYDRATE AND SODIUM CHLORIDE 100 ML/HR: 150; 5; 450 INJECTION, SOLUTION INTRAVENOUS at 20:46

## 2017-01-01 RX ADMIN — NADOLOL 20 MG: 20 TABLET ORAL at 09:12

## 2017-01-01 RX ADMIN — FAMOTIDINE 20 MG: 10 INJECTION INTRAVENOUS at 13:34

## 2017-01-01 RX ADMIN — HYDROCODONE BITARTRATE AND ACETAMINOPHEN 1 TABLET: 10; 325 TABLET ORAL at 16:34

## 2017-01-01 RX ADMIN — INSULIN ASPART 3 UNITS: 100 INJECTION, SOLUTION INTRAVENOUS; SUBCUTANEOUS at 08:12

## 2017-01-01 RX ADMIN — PANTOPRAZOLE SODIUM 40 MG: 40 TABLET, DELAYED RELEASE ORAL at 05:34

## 2017-01-01 RX ADMIN — CYANOCOBALAMIN 1000 MCG: 1000 INJECTION, SOLUTION INTRAMUSCULAR; SUBCUTANEOUS at 11:22

## 2017-01-01 RX ADMIN — INSULIN ASPART 6 UNITS: 100 INJECTION, SOLUTION INTRAVENOUS; SUBCUTANEOUS at 07:31

## 2017-01-01 RX ADMIN — SODIUM CHLORIDE 100 ML/HR: 4.5 INJECTION, SOLUTION INTRAVENOUS at 15:04

## 2017-01-01 RX ADMIN — NYSTATIN 500000 UNITS: 100000 SUSPENSION ORAL at 12:03

## 2017-01-01 RX ADMIN — PANTOPRAZOLE SODIUM 40 MG: 40 TABLET, DELAYED RELEASE ORAL at 05:49

## 2017-01-01 RX ADMIN — WARFARIN SODIUM 2.5 MG: 2.5 TABLET ORAL at 19:15

## 2017-01-01 RX ADMIN — SODIUM CHLORIDE 250 ML: 900 INJECTION, SOLUTION INTRAVENOUS at 15:45

## 2017-01-01 RX ADMIN — LEVETIRACETAM 500 MG: 500 TABLET, FILM COATED ORAL at 00:11

## 2017-01-01 RX ADMIN — INSULIN ASPART 3 UNITS: 100 INJECTION, SOLUTION INTRAVENOUS; SUBCUTANEOUS at 13:38

## 2017-01-01 RX ADMIN — SODIUM CHLORIDE 100 ML/HR: 4.5 INJECTION, SOLUTION INTRAVENOUS at 12:05

## 2017-01-01 RX ADMIN — HEPARIN SODIUM 3000 UNITS: 5000 INJECTION, SOLUTION INTRAVENOUS; SUBCUTANEOUS at 15:32

## 2017-01-01 RX ADMIN — VALSARTAN 160 MG: 160 TABLET ORAL at 08:42

## 2017-01-01 RX ADMIN — HEPARIN SODIUM 3000 UNITS: 5000 INJECTION, SOLUTION INTRAVENOUS; SUBCUTANEOUS at 06:15

## 2017-01-01 RX ADMIN — SODIUM CHLORIDE, POTASSIUM CHLORIDE, SODIUM LACTATE AND CALCIUM CHLORIDE 1000 ML: 600; 310; 30; 20 INJECTION, SOLUTION INTRAVENOUS at 13:52

## 2017-01-01 RX ADMIN — HEPARIN SODIUM 15 UNITS/KG/HR: 10000 INJECTION, SOLUTION INTRAVENOUS at 12:57

## 2017-01-01 RX ADMIN — INSULIN DETEMIR 30 UNITS: 100 INJECTION, SOLUTION SUBCUTANEOUS at 21:15

## 2017-01-01 RX ADMIN — HEPARIN SODIUM 12 UNITS/KG/HR: 10000 INJECTION, SOLUTION INTRAVENOUS at 18:40

## 2017-01-01 RX ADMIN — MICONAZOLE NITRATE: 2 PASTE TOPICAL at 21:10

## 2017-01-01 RX ADMIN — VALSARTAN 160 MG: 160 TABLET ORAL at 09:38

## 2017-01-01 RX ADMIN — IOPAMIDOL 100 ML: 612 INJECTION, SOLUTION INTRAVENOUS at 14:47

## 2017-01-01 RX ADMIN — DEXTROSE MONOHYDRATE 25 G: 25 INJECTION, SOLUTION INTRAVENOUS at 07:51

## 2017-01-01 RX ADMIN — HYDROCODONE BITARTRATE AND ACETAMINOPHEN 1 TABLET: 5; 325 TABLET ORAL at 08:18

## 2017-01-01 RX ADMIN — SODIUM CHLORIDE 500 ML: 9 INJECTION, SOLUTION INTRAVENOUS at 12:29

## 2017-01-01 RX ADMIN — DONEPEZIL HYDROCHLORIDE 5 MG: 5 TABLET, FILM COATED ORAL at 20:51

## 2017-01-01 RX ADMIN — POTASSIUM CHLORIDE AND SODIUM CHLORIDE 100 ML/HR: 450; 150 INJECTION, SOLUTION INTRAVENOUS at 23:11

## 2017-01-01 RX ADMIN — INSULIN ASPART 4 UNITS: 100 INJECTION, SOLUTION INTRAVENOUS; SUBCUTANEOUS at 17:24

## 2017-01-01 RX ADMIN — POTASSIUM CHLORIDE AND SODIUM CHLORIDE 100 ML/HR: 450; 150 INJECTION, SOLUTION INTRAVENOUS at 11:00

## 2017-01-01 RX ADMIN — HYDROCODONE BITARTRATE AND ACETAMINOPHEN 1 TABLET: 5; 325 TABLET ORAL at 17:24

## 2017-01-01 RX ADMIN — LEVETIRACETAM 500 MG: 500 TABLET, FILM COATED ORAL at 11:26

## 2017-01-01 RX ADMIN — Medication 250 MG: at 12:03

## 2017-01-01 RX ADMIN — PANTOPRAZOLE SODIUM 40 MG: 40 TABLET, DELAYED RELEASE ORAL at 07:36

## 2017-01-01 RX ADMIN — VALSARTAN 160 MG: 160 TABLET ORAL at 09:07

## 2017-01-01 RX ADMIN — OXYBUTYNIN CHLORIDE 5 MG: 5 TABLET ORAL at 08:41

## 2017-01-01 RX ADMIN — LEVETIRACETAM 500 MG: 500 TABLET, FILM COATED ORAL at 20:16

## 2017-01-01 RX ADMIN — NYSTATIN 500000 UNITS: 100000 SUSPENSION ORAL at 09:18

## 2017-01-01 RX ADMIN — NADOLOL 20 MG: 40 TABLET ORAL at 08:22

## 2017-01-01 RX ADMIN — NYSTATIN 500000 UNITS: 100000 SUSPENSION ORAL at 23:43

## 2017-01-01 RX ADMIN — OXYBUTYNIN CHLORIDE 5 MG: 5 TABLET ORAL at 09:38

## 2017-01-01 RX ADMIN — INSULIN ASPART 6 UNITS: 100 INJECTION, SOLUTION INTRAVENOUS; SUBCUTANEOUS at 12:48

## 2017-01-01 RX ADMIN — INSULIN ASPART 3 UNITS: 100 INJECTION, SOLUTION INTRAVENOUS; SUBCUTANEOUS at 12:25

## 2017-01-01 RX ADMIN — VALSARTAN 160 MG: 160 TABLET ORAL at 09:35

## 2017-01-01 RX ADMIN — INSULIN ASPART 6 UNITS: 100 INJECTION, SOLUTION INTRAVENOUS; SUBCUTANEOUS at 17:32

## 2017-01-01 RX ADMIN — VALSARTAN 160 MG: 160 TABLET ORAL at 08:44

## 2017-01-01 RX ADMIN — OXYBUTYNIN CHLORIDE 5 MG: 5 TABLET ORAL at 09:35

## 2017-01-01 RX ADMIN — TRAZODONE HYDROCHLORIDE 50 MG: 50 TABLET ORAL at 20:52

## 2017-01-01 RX ADMIN — NYSTATIN 500000 UNITS: 100000 SUSPENSION ORAL at 17:32

## 2017-01-01 RX ADMIN — HYDROCODONE BITARTRATE AND ACETAMINOPHEN 1 TABLET: 5; 325 TABLET ORAL at 09:05

## 2017-01-01 RX ADMIN — HYDROCODONE BITARTRATE AND ACETAMINOPHEN 1 TABLET: 5; 325 TABLET ORAL at 20:58

## 2017-01-01 RX ADMIN — PANTOPRAZOLE SODIUM 40 MG: 40 TABLET, DELAYED RELEASE ORAL at 05:53

## 2017-01-01 RX ADMIN — MICONAZOLE NITRATE 1 APPLICATION: 2 PASTE TOPICAL at 08:19

## 2017-01-01 RX ADMIN — INSULIN ASPART 3 UNITS: 100 INJECTION, SOLUTION INTRAVENOUS; SUBCUTANEOUS at 13:49

## 2017-01-01 RX ADMIN — NYSTATIN 500000 UNITS: 100000 SUSPENSION ORAL at 18:01

## 2017-01-01 RX ADMIN — INSULIN ASPART 5 UNITS: 100 INJECTION, SOLUTION INTRAVENOUS; SUBCUTANEOUS at 17:12

## 2017-01-01 RX ADMIN — ENOXAPARIN SODIUM 60 MG: 60 INJECTION SUBCUTANEOUS at 12:21

## 2017-01-01 RX ADMIN — HYDROCODONE BITARTRATE AND ACETAMINOPHEN 1 TABLET: 5; 325 TABLET ORAL at 11:38

## 2017-01-01 RX ADMIN — LEVETIRACETAM 500 MG: 500 TABLET, FILM COATED ORAL at 21:04

## 2017-01-01 RX ADMIN — SODIUM CHLORIDE 30 ML/HR: 4.5 INJECTION, SOLUTION INTRAVENOUS at 13:45

## 2017-01-01 RX ADMIN — SODIUM CHLORIDE 75 ML/HR: 9 INJECTION, SOLUTION INTRAVENOUS at 17:22

## 2017-01-01 RX ADMIN — INSULIN ASPART 5 UNITS: 100 INJECTION, SOLUTION INTRAVENOUS; SUBCUTANEOUS at 11:37

## 2017-01-01 RX ADMIN — MICONAZOLE NITRATE: 2 PASTE TOPICAL at 08:21

## 2017-01-01 RX ADMIN — HEPARIN SODIUM 3000 UNITS: 5000 INJECTION, SOLUTION INTRAVENOUS; SUBCUTANEOUS at 07:32

## 2017-01-01 RX ADMIN — INSULIN ASPART 6 UNITS: 100 INJECTION, SOLUTION INTRAVENOUS; SUBCUTANEOUS at 21:21

## 2017-01-01 RX ADMIN — VALSARTAN 160 MG: 160 TABLET ORAL at 10:53

## 2017-01-01 RX ADMIN — NYSTATIN 500000 UNITS: 100000 SUSPENSION ORAL at 09:07

## 2017-01-01 RX ADMIN — FERUMOXYTOL 510 MG: 510 INJECTION INTRAVENOUS at 16:24

## 2017-01-01 RX ADMIN — Medication 250 MG: at 08:18

## 2017-01-01 RX ADMIN — SODIUM CHLORIDE 250 ML: 900 INJECTION, SOLUTION INTRAVENOUS at 13:35

## 2017-01-01 RX ADMIN — LEVETIRACETAM 500 MG: 500 TABLET, FILM COATED ORAL at 21:15

## 2017-01-01 RX ADMIN — PROPOFOL 150 MG: 10 INJECTION, EMULSION INTRAVENOUS at 14:05

## 2017-01-01 RX ADMIN — VALSARTAN 160 MG: 160 TABLET ORAL at 09:12

## 2017-01-01 RX ADMIN — NADOLOL 20 MG: 20 TABLET ORAL at 09:35

## 2017-01-01 RX ADMIN — OXYBUTYNIN CHLORIDE 5 MG: 5 TABLET ORAL at 09:12

## 2017-01-01 RX ADMIN — NYSTATIN 500000 UNITS: 100000 SUSPENSION ORAL at 21:51

## 2017-01-01 RX ADMIN — INSULIN ASPART 5 UNITS: 100 INJECTION, SOLUTION INTRAVENOUS; SUBCUTANEOUS at 21:10

## 2017-01-01 RX ADMIN — VALSARTAN 160 MG: 160 TABLET ORAL at 00:10

## 2017-01-01 RX ADMIN — INSULIN ASPART 2 UNITS: 100 INJECTION, SOLUTION INTRAVENOUS; SUBCUTANEOUS at 08:42

## 2017-01-01 RX ADMIN — LIDOCAINE HYDROCHLORIDE 20 ML: 10 INJECTION, SOLUTION INFILTRATION; PERINEURAL at 18:53

## 2017-01-01 RX ADMIN — OXYBUTYNIN CHLORIDE 5 MG: 5 TABLET ORAL at 10:53

## 2017-01-01 RX ADMIN — WARFARIN SODIUM 1 MG: 1 TABLET ORAL at 18:06

## 2017-01-01 RX ADMIN — ATORVASTATIN CALCIUM 10 MG: 10 TABLET, FILM COATED ORAL at 11:26

## 2017-01-01 RX ADMIN — LEVETIRACETAM 500 MG: 500 TABLET, FILM COATED ORAL at 09:00

## 2017-01-01 RX ADMIN — CYANOCOBALAMIN 1000 MCG: 1000 INJECTION, SOLUTION INTRAMUSCULAR; SUBCUTANEOUS at 14:31

## 2017-01-01 RX ADMIN — PANTOPRAZOLE SODIUM 40 MG: 40 TABLET, DELAYED RELEASE ORAL at 06:43

## 2017-01-01 RX ADMIN — ATORVASTATIN CALCIUM 10 MG: 10 TABLET, FILM COATED ORAL at 08:11

## 2017-01-01 RX ADMIN — LEVETIRACETAM 500 MG: 500 TABLET, FILM COATED ORAL at 19:09

## 2017-01-01 RX ADMIN — NADOLOL 20 MG: 40 TABLET ORAL at 09:06

## 2017-01-01 RX ADMIN — LEVETIRACETAM 500 MG: 500 TABLET, FILM COATED ORAL at 18:01

## 2017-01-01 RX ADMIN — NYSTATIN 500000 UNITS: 100000 SUSPENSION ORAL at 21:04

## 2017-01-01 RX ADMIN — HEPARIN SODIUM 15 UNITS/KG/HR: 10000 INJECTION, SOLUTION INTRAVENOUS at 15:32

## 2017-01-01 RX ADMIN — TRAZODONE HYDROCHLORIDE 50 MG: 50 TABLET ORAL at 21:04

## 2017-01-01 RX ADMIN — HEPARIN SODIUM 5900 UNITS: 5000 INJECTION, SOLUTION INTRAVENOUS; SUBCUTANEOUS at 17:06

## 2017-01-01 RX ADMIN — HYDROCODONE BITARTRATE AND ACETAMINOPHEN 1 TABLET: 5; 325 TABLET ORAL at 01:30

## 2017-01-01 RX ADMIN — INSULIN DETEMIR 30 UNITS: 100 INJECTION, SOLUTION SUBCUTANEOUS at 21:51

## 2017-01-01 RX ADMIN — SODIUM CHLORIDE 50 ML/HR: 4.5 INJECTION, SOLUTION INTRAVENOUS at 21:22

## 2017-01-01 RX ADMIN — HYDROMORPHONE HYDROCHLORIDE 0.5 MG: 1 INJECTION, SOLUTION INTRAMUSCULAR; INTRAVENOUS; SUBCUTANEOUS at 11:38

## 2017-01-01 RX ADMIN — CYANOCOBALAMIN 1000 MCG: 1000 INJECTION, SOLUTION INTRAMUSCULAR; SUBCUTANEOUS at 13:08

## 2017-01-01 RX ADMIN — INSULIN ASPART 6 UNITS: 100 INJECTION, SOLUTION INTRAVENOUS; SUBCUTANEOUS at 18:05

## 2017-01-01 RX ADMIN — Medication 250 MG: at 08:22

## 2017-01-01 RX ADMIN — OXYBUTYNIN CHLORIDE 5 MG: 5 TABLET ORAL at 08:43

## 2017-01-01 RX ADMIN — DONEPEZIL HYDROCHLORIDE 5 MG: 5 TABLET, FILM COATED ORAL at 21:21

## 2017-01-01 RX ADMIN — HYDROCODONE BITARTRATE AND ACETAMINOPHEN 1 TABLET: 10; 325 TABLET ORAL at 20:51

## 2017-01-01 RX ADMIN — INSULIN DETEMIR 20 UNITS: 100 INJECTION, SOLUTION SUBCUTANEOUS at 22:57

## 2017-01-01 RX ADMIN — MICONAZOLE NITRATE: 2 PASTE TOPICAL at 21:14

## 2017-01-01 RX ADMIN — MICONAZOLE NITRATE 1 APPLICATION: 2 PASTE TOPICAL at 08:13

## 2017-01-01 RX ADMIN — PANTOPRAZOLE SODIUM 40 MG: 40 TABLET, DELAYED RELEASE ORAL at 06:04

## 2017-01-01 RX ADMIN — PANTOPRAZOLE SODIUM 40 MG: 40 TABLET, DELAYED RELEASE ORAL at 06:15

## 2017-01-01 RX ADMIN — DONEPEZIL HYDROCHLORIDE 5 MG: 5 TABLET, FILM COATED ORAL at 20:58

## 2017-01-01 RX ADMIN — PANTOPRAZOLE SODIUM 40 MG: 40 TABLET, DELAYED RELEASE ORAL at 04:43

## 2017-01-01 RX ADMIN — HYDROMORPHONE HYDROCHLORIDE 1 MG: 1 INJECTION, SOLUTION INTRAMUSCULAR; INTRAVENOUS; SUBCUTANEOUS at 12:23

## 2017-01-01 RX ADMIN — INSULIN DETEMIR 30 UNITS: 100 INJECTION, SOLUTION SUBCUTANEOUS at 21:11

## 2017-01-01 RX ADMIN — ENOXAPARIN SODIUM 60 MG: 60 INJECTION SUBCUTANEOUS at 12:32

## 2017-01-01 RX ADMIN — PANTOPRAZOLE SODIUM 40 MG: 40 TABLET, DELAYED RELEASE ORAL at 06:46

## 2017-01-01 RX ADMIN — LEVETIRACETAM 500 MG: 500 TABLET, FILM COATED ORAL at 20:52

## 2017-01-01 RX ADMIN — INSULIN ASPART 5 UNITS: 100 INJECTION, SOLUTION INTRAVENOUS; SUBCUTANEOUS at 21:05

## 2017-01-01 RX ADMIN — HEPARIN SODIUM 18 UNITS/KG/HR: 10000 INJECTION, SOLUTION INTRAVENOUS at 13:15

## 2017-01-01 RX ADMIN — TRAZODONE HYDROCHLORIDE 50 MG: 50 TABLET ORAL at 21:22

## 2017-01-01 RX ADMIN — DONEPEZIL HYDROCHLORIDE 5 MG: 5 TABLET, FILM COATED ORAL at 21:09

## 2017-01-01 RX ADMIN — INSULIN ASPART 5 UNITS: 100 INJECTION, SOLUTION INTRAVENOUS; SUBCUTANEOUS at 18:01

## 2017-01-01 RX ADMIN — HYDROCODONE BITARTRATE AND ACETAMINOPHEN 1 TABLET: 5; 325 TABLET ORAL at 08:06

## 2017-01-01 RX ADMIN — LEVETIRACETAM 500 MG: 500 TABLET, FILM COATED ORAL at 10:53

## 2017-01-01 RX ADMIN — PANTOPRAZOLE SODIUM 40 MG: 40 TABLET, DELAYED RELEASE ORAL at 05:29

## 2017-01-01 RX ADMIN — INSULIN ASPART 7 UNITS: 100 INJECTION, SOLUTION INTRAVENOUS; SUBCUTANEOUS at 21:49

## 2017-01-01 RX ADMIN — ATORVASTATIN CALCIUM 10 MG: 10 TABLET, FILM COATED ORAL at 08:22

## 2017-01-01 RX ADMIN — ATORVASTATIN CALCIUM 10 MG: 10 TABLET, FILM COATED ORAL at 08:18

## 2017-01-01 RX ADMIN — LEVETIRACETAM 500 MG: 500 TABLET, FILM COATED ORAL at 21:10

## 2017-01-01 RX ADMIN — LEVETIRACETAM 500 MG: 500 TABLET, FILM COATED ORAL at 09:18

## 2017-01-01 RX ADMIN — HYDROCODONE BITARTRATE AND ACETAMINOPHEN 1 TABLET: 10; 325 TABLET ORAL at 08:42

## 2017-01-01 RX ADMIN — HEPARIN SODIUM 18 UNITS/KG/HR: 10000 INJECTION, SOLUTION INTRAVENOUS at 23:58

## 2017-01-01 RX ADMIN — HEPARIN SODIUM 13 UNITS/KG/HR: 10000 INJECTION, SOLUTION INTRAVENOUS at 20:04

## 2017-01-01 RX ADMIN — HEPARIN SODIUM 3000 UNITS: 5000 INJECTION, SOLUTION INTRAVENOUS; SUBCUTANEOUS at 03:39

## 2017-01-01 RX ADMIN — NYSTATIN 500000 UNITS: 100000 SUSPENSION ORAL at 17:22

## 2017-01-01 RX ADMIN — LOPERAMIDE HYDROCHLORIDE 2 MG: 2 CAPSULE ORAL at 21:51

## 2017-01-01 RX ADMIN — VALSARTAN 160 MG: 160 TABLET ORAL at 08:15

## 2017-01-01 RX ADMIN — NYSTATIN 500000 UNITS: 100000 SUSPENSION ORAL at 13:49

## 2017-01-01 RX ADMIN — ENOXAPARIN SODIUM 60 MG: 60 INJECTION SUBCUTANEOUS at 01:15

## 2017-01-01 RX ADMIN — ENOXAPARIN SODIUM 60 MG: 60 INJECTION SUBCUTANEOUS at 15:15

## 2017-01-01 RX ADMIN — HYDROMORPHONE HYDROCHLORIDE 1 MG: 1 INJECTION, SOLUTION INTRAMUSCULAR; INTRAVENOUS; SUBCUTANEOUS at 20:39

## 2017-01-01 RX ADMIN — HYDROCODONE BITARTRATE AND ACETAMINOPHEN 1 TABLET: 5; 325 TABLET ORAL at 17:34

## 2017-01-01 RX ADMIN — INSULIN ASPART 8 UNITS: 100 INJECTION, SOLUTION INTRAVENOUS; SUBCUTANEOUS at 17:52

## 2017-01-01 RX ADMIN — NYSTATIN 500000 UNITS: 100000 SUSPENSION ORAL at 17:12

## 2017-01-01 RX ADMIN — CYANOCOBALAMIN 1000 MCG: 1000 INJECTION, SOLUTION INTRAMUSCULAR; SUBCUTANEOUS at 15:57

## 2017-01-01 RX ADMIN — INSULIN ASPART 4 UNITS: 100 INJECTION, SOLUTION INTRAVENOUS; SUBCUTANEOUS at 20:55

## 2017-01-01 RX ADMIN — HYDROMORPHONE HYDROCHLORIDE 1 MG: 1 INJECTION, SOLUTION INTRAMUSCULAR; INTRAVENOUS; SUBCUTANEOUS at 17:53

## 2017-01-01 RX ADMIN — DONEPEZIL HYDROCHLORIDE 5 MG: 5 TABLET, FILM COATED ORAL at 20:17

## 2017-01-01 RX ADMIN — HEPARIN SODIUM 15 UNITS/KG/HR: 10000 INJECTION, SOLUTION INTRAVENOUS at 13:50

## 2017-01-01 RX ADMIN — VALSARTAN 160 MG: 160 TABLET ORAL at 09:18

## 2017-01-01 RX ADMIN — NYSTATIN 500000 UNITS: 100000 SUSPENSION ORAL at 12:26

## 2017-01-01 RX ADMIN — HEPARIN SODIUM 18 UNITS/KG/HR: 10000 INJECTION, SOLUTION INTRAVENOUS at 17:09

## 2017-01-01 RX ADMIN — SODIUM CHLORIDE 50 ML/HR: 4.5 INJECTION, SOLUTION INTRAVENOUS at 02:02

## 2017-01-01 RX ADMIN — Medication 250 MG: at 17:22

## 2017-01-01 RX ADMIN — Medication 250 MG: at 18:01

## 2017-01-01 RX ADMIN — LEVETIRACETAM 500 MG: 500 TABLET, FILM COATED ORAL at 17:55

## 2017-01-01 RX ADMIN — INSULIN DETEMIR 25 UNITS: 100 INJECTION, SOLUTION SUBCUTANEOUS at 21:21

## 2017-01-01 RX ADMIN — NADOLOL 20 MG: 40 TABLET ORAL at 08:18

## 2017-01-01 RX ADMIN — NYSTATIN 500000 UNITS: 100000 SUSPENSION ORAL at 08:21

## 2017-01-01 RX ADMIN — DONEPEZIL HYDROCHLORIDE 5 MG: 5 TABLET, FILM COATED ORAL at 20:36

## 2017-01-01 RX ADMIN — HYDROCODONE BITARTRATE AND ACETAMINOPHEN 1 TABLET: 5; 325 TABLET ORAL at 04:43

## 2017-01-01 RX ADMIN — HYDROMORPHONE HYDROCHLORIDE 1 MG: 1 INJECTION, SOLUTION INTRAMUSCULAR; INTRAVENOUS; SUBCUTANEOUS at 16:21

## 2017-01-01 RX ADMIN — TRAZODONE HYDROCHLORIDE 50 MG: 50 TABLET ORAL at 21:09

## 2017-01-01 RX ADMIN — LEVETIRACETAM 500 MG: 500 TABLET, FILM COATED ORAL at 18:05

## 2017-01-01 RX ADMIN — CYANOCOBALAMIN 1000 MCG: 1000 INJECTION, SOLUTION INTRAMUSCULAR; SUBCUTANEOUS at 13:24

## 2017-01-01 RX ADMIN — PANTOPRAZOLE SODIUM 40 MG: 40 TABLET, DELAYED RELEASE ORAL at 06:08

## 2017-01-01 RX ADMIN — Medication 250 MG: at 17:52

## 2017-01-01 RX ADMIN — CYANOCOBALAMIN 1000 MCG: 1000 INJECTION, SOLUTION INTRAMUSCULAR; SUBCUTANEOUS at 14:35

## 2017-01-01 RX ADMIN — LEVETIRACETAM 500 MG: 500 TABLET, FILM COATED ORAL at 17:32

## 2017-01-01 RX ADMIN — MICONAZOLE NITRATE 1 APPLICATION: 2 PASTE TOPICAL at 20:52

## 2017-01-01 RX ADMIN — HYDROCODONE BITARTRATE AND ACETAMINOPHEN 1 TABLET: 10; 325 TABLET ORAL at 00:37

## 2017-01-01 RX ADMIN — INSULIN DETEMIR 30 UNITS: 100 INJECTION, SOLUTION SUBCUTANEOUS at 21:04

## 2017-01-01 RX ADMIN — PANTOPRAZOLE SODIUM 40 MG: 40 TABLET, DELAYED RELEASE ORAL at 08:44

## 2017-01-01 RX ADMIN — HYDROMORPHONE HYDROCHLORIDE 1 MG: 1 INJECTION, SOLUTION INTRAMUSCULAR; INTRAVENOUS; SUBCUTANEOUS at 04:14

## 2017-01-01 RX ADMIN — LEVETIRACETAM 500 MG: 500 TABLET, FILM COATED ORAL at 09:38

## 2017-01-01 RX ADMIN — LEVETIRACETAM 500 MG: 500 TABLET, FILM COATED ORAL at 17:29

## 2017-01-01 RX ADMIN — SODIUM CHLORIDE 75 ML/HR: 9 INJECTION, SOLUTION INTRAVENOUS at 06:46

## 2017-01-01 RX ADMIN — PANTOPRAZOLE SODIUM 40 MG: 40 TABLET, DELAYED RELEASE ORAL at 07:16

## 2017-01-01 RX ADMIN — POTASSIUM CHLORIDE, DEXTROSE MONOHYDRATE AND SODIUM CHLORIDE 100 ML/HR: 150; 5; 450 INJECTION, SOLUTION INTRAVENOUS at 07:26

## 2017-01-01 RX ADMIN — TRAZODONE HYDROCHLORIDE 50 MG: 50 TABLET ORAL at 20:51

## 2017-01-01 RX ADMIN — CYANOCOBALAMIN 1000 MCG: 1000 INJECTION, SOLUTION INTRAMUSCULAR; SUBCUTANEOUS at 10:14

## 2017-01-01 RX ADMIN — TRAZODONE HYDROCHLORIDE 50 MG: 50 TABLET ORAL at 20:36

## 2017-01-01 RX ADMIN — MICONAZOLE NITRATE: 2 PASTE TOPICAL at 09:23

## 2017-01-01 RX ADMIN — HEPARIN SODIUM 18 UNITS/KG/HR: 10000 INJECTION, SOLUTION INTRAVENOUS at 02:02

## 2017-01-01 RX ADMIN — ATORVASTATIN CALCIUM 10 MG: 10 TABLET, FILM COATED ORAL at 00:10

## 2017-01-01 RX ADMIN — HEPARIN SODIUM 11 UNITS/KG/HR: 10000 INJECTION, SOLUTION INTRAVENOUS at 04:54

## 2017-01-01 RX ADMIN — LEVETIRACETAM 500 MG: 500 TABLET, FILM COATED ORAL at 09:12

## 2017-01-01 RX ADMIN — TRAZODONE HYDROCHLORIDE 50 MG: 50 TABLET ORAL at 21:06

## 2017-01-01 RX ADMIN — DIPHENHYDRAMINE HYDROCHLORIDE 25 MG: 25 CAPSULE ORAL at 13:34

## 2017-01-01 RX ADMIN — TRAZODONE HYDROCHLORIDE 50 MG: 50 TABLET ORAL at 20:20

## 2017-01-01 RX ADMIN — Medication 250 MG: at 08:11

## 2017-01-01 RX ADMIN — CYANOCOBALAMIN 1000 MCG: 1000 INJECTION, SOLUTION INTRAMUSCULAR; SUBCUTANEOUS at 13:00

## 2017-01-01 RX ADMIN — ONDANSETRON 4 MG: 2 INJECTION INTRAMUSCULAR; INTRAVENOUS at 11:28

## 2017-01-01 RX ADMIN — NADOLOL 20 MG: 20 TABLET ORAL at 10:53

## 2017-01-01 RX ADMIN — INSULIN ASPART 3 UNITS: 100 INJECTION, SOLUTION INTRAVENOUS; SUBCUTANEOUS at 08:21

## 2017-01-01 RX ADMIN — CEFTRIAXONE SODIUM 1 G: 1 INJECTION, SOLUTION INTRAVENOUS at 00:06

## 2017-01-01 RX ADMIN — LEVETIRACETAM 500 MG: 500 TABLET, FILM COATED ORAL at 17:24

## 2017-01-01 RX ADMIN — CEFTRIAXONE SODIUM 2 G: 2 INJECTION, SOLUTION INTRAVENOUS at 15:55

## 2017-01-01 RX ADMIN — NYSTATIN 500000 UNITS: 100000 SUSPENSION ORAL at 22:55

## 2017-01-01 RX ADMIN — SODIUM CHLORIDE, POTASSIUM CHLORIDE, SODIUM LACTATE AND CALCIUM CHLORIDE: 600; 310; 30; 20 INJECTION, SOLUTION INTRAVENOUS at 13:59

## 2017-01-01 RX ADMIN — HYDROMORPHONE HYDROCHLORIDE 1 MG: 1 INJECTION, SOLUTION INTRAMUSCULAR; INTRAVENOUS; SUBCUTANEOUS at 13:17

## 2017-01-01 RX ADMIN — INSULIN ASPART 4 UNITS: 100 INJECTION, SOLUTION INTRAVENOUS; SUBCUTANEOUS at 22:26

## 2017-01-01 RX ADMIN — HYDROMORPHONE HYDROCHLORIDE 1 MG: 1 INJECTION, SOLUTION INTRAMUSCULAR; INTRAVENOUS; SUBCUTANEOUS at 09:24

## 2017-01-01 RX ADMIN — TRAZODONE HYDROCHLORIDE 50 MG: 50 TABLET ORAL at 21:15

## 2017-01-01 RX ADMIN — LEVETIRACETAM 500 MG: 500 TABLET, FILM COATED ORAL at 21:22

## 2017-01-01 RX ADMIN — NADOLOL 20 MG: 40 TABLET ORAL at 08:12

## 2017-01-01 RX ADMIN — DONEPEZIL HYDROCHLORIDE 5 MG: 5 TABLET, FILM COATED ORAL at 20:20

## 2017-01-01 RX ADMIN — CEFTRIAXONE SODIUM 2 G: 2 INJECTION, SOLUTION INTRAVENOUS at 04:05

## 2017-01-01 RX ADMIN — NYSTATIN 500000 UNITS: 100000 SUSPENSION ORAL at 17:52

## 2017-01-01 RX ADMIN — HEPARIN SODIUM 15 UNITS/KG/HR: 10000 INJECTION, SOLUTION INTRAVENOUS at 15:36

## 2017-01-01 RX ADMIN — DEXTROSE MONOHYDRATE 25 G: 25 INJECTION, SOLUTION INTRAVENOUS at 09:29

## 2017-01-01 RX ADMIN — HEPARIN SODIUM 11 UNITS/KG/HR: 10000 INJECTION, SOLUTION INTRAVENOUS at 12:36

## 2017-01-01 RX ADMIN — LEVETIRACETAM 500 MG: 500 TABLET, FILM COATED ORAL at 08:15

## 2017-01-01 RX ADMIN — DIPHENHYDRAMINE HYDROCHLORIDE 25 MG: 25 CAPSULE ORAL at 15:55

## 2017-01-01 RX ADMIN — NYSTATIN 500000 UNITS: 100000 SUSPENSION ORAL at 21:15

## 2017-01-01 RX ADMIN — HEPARIN SODIUM 3000 UNITS: 5000 INJECTION, SOLUTION INTRAVENOUS; SUBCUTANEOUS at 15:50

## 2017-01-01 RX ADMIN — INSULIN ASPART 5 UNITS: 100 INJECTION, SOLUTION INTRAVENOUS; SUBCUTANEOUS at 21:17

## 2017-01-01 RX ADMIN — NYSTATIN 500000 UNITS: 100000 SUSPENSION ORAL at 13:38

## 2017-01-01 RX ADMIN — INSULIN DETEMIR 30 UNITS: 100 INJECTION, SOLUTION SUBCUTANEOUS at 00:13

## 2017-01-01 RX ADMIN — VALSARTAN 160 MG: 160 TABLET ORAL at 11:26

## 2017-01-01 RX ADMIN — NADOLOL 20 MG: 20 TABLET ORAL at 09:00

## 2017-01-01 RX ADMIN — HEPARIN SODIUM 13 UNITS/KG/HR: 10000 INJECTION, SOLUTION INTRAVENOUS at 07:34

## 2017-01-01 RX ADMIN — INSULIN ASPART 4 UNITS: 100 INJECTION, SOLUTION INTRAVENOUS; SUBCUTANEOUS at 17:55

## 2017-01-01 RX ADMIN — VALSARTAN 160 MG: 160 TABLET ORAL at 09:00

## 2017-01-01 RX ADMIN — INSULIN ASPART 14 UNITS: 100 INJECTION, SOLUTION INTRAVENOUS; SUBCUTANEOUS at 00:11

## 2017-01-01 RX ADMIN — NYSTATIN 500000 UNITS: 100000 SUSPENSION ORAL at 00:11

## 2017-01-01 RX ADMIN — LEVETIRACETAM 500 MG: 500 TABLET, FILM COATED ORAL at 08:11

## 2017-01-01 RX ADMIN — INSULIN ASPART 2 UNITS: 100 INJECTION, SOLUTION INTRAVENOUS; SUBCUTANEOUS at 12:35

## 2017-01-01 RX ADMIN — FERUMOXYTOL 510 MG: 510 INJECTION INTRAVENOUS at 14:03

## 2017-01-01 RX ADMIN — FAMOTIDINE 20 MG: 10 INJECTION INTRAVENOUS at 15:55

## 2017-01-01 RX ADMIN — NADOLOL 20 MG: 20 TABLET ORAL at 08:43

## 2017-01-01 RX ADMIN — HYDROCODONE BITARTRATE AND ACETAMINOPHEN 1 TABLET: 5; 325 TABLET ORAL at 18:02

## 2017-01-01 RX ADMIN — MICONAZOLE NITRATE: 2 PASTE TOPICAL at 17:52

## 2017-01-01 RX ADMIN — DIATRIZOATE MEGLUMINE AND DIATRIZOATE SODIUM 30 ML: 660; 100 LIQUID ORAL; RECTAL at 10:42

## 2017-01-01 RX ADMIN — WARFARIN SODIUM 0.5 MG: 1 TABLET ORAL at 17:24

## 2017-01-01 RX ADMIN — INSULIN ASPART 8 UNITS: 100 INJECTION, SOLUTION INTRAVENOUS; SUBCUTANEOUS at 17:21

## 2017-01-01 RX ADMIN — OXYBUTYNIN CHLORIDE 5 MG: 5 TABLET ORAL at 08:15

## 2017-01-01 RX ADMIN — INSULIN ASPART 4 UNITS: 100 INJECTION, SOLUTION INTRAVENOUS; SUBCUTANEOUS at 20:59

## 2017-01-01 RX ADMIN — INSULIN ASPART 3 UNITS: 100 INJECTION, SOLUTION INTRAVENOUS; SUBCUTANEOUS at 08:18

## 2017-01-01 RX ADMIN — LEVETIRACETAM 500 MG: 500 TABLET, FILM COATED ORAL at 08:21

## 2017-01-01 RX ADMIN — HEPARIN SODIUM 15 UNITS/KG/HR: 10000 INJECTION, SOLUTION INTRAVENOUS at 01:27

## 2017-01-01 RX ADMIN — Medication 250 MG: at 17:32

## 2017-01-01 RX ADMIN — CYANOCOBALAMIN 1000 MCG: 1000 INJECTION, SOLUTION INTRAMUSCULAR; SUBCUTANEOUS at 13:28

## 2017-01-01 RX ADMIN — NYSTATIN 500000 UNITS: 100000 SUSPENSION ORAL at 08:18

## 2017-01-01 RX ADMIN — INSULIN ASPART 7 UNITS: 100 INJECTION, SOLUTION INTRAVENOUS; SUBCUTANEOUS at 12:21

## 2017-01-01 RX ADMIN — LEVETIRACETAM 500 MG: 500 TABLET, FILM COATED ORAL at 18:04

## 2017-01-01 RX ADMIN — Medication 250 MG: at 09:18

## 2017-01-01 RX ADMIN — LEVETIRACETAM 500 MG: 500 TABLET, FILM COATED ORAL at 08:18

## 2017-01-01 RX ADMIN — INSULIN DETEMIR 30 UNITS: 100 INJECTION, SOLUTION SUBCUTANEOUS at 21:28

## 2017-01-01 RX ADMIN — Medication 250 MG: at 17:12

## 2017-01-01 RX ADMIN — HYDROCODONE BITARTRATE AND ACETAMINOPHEN 1 TABLET: 10; 325 TABLET ORAL at 21:04

## 2017-01-01 RX ADMIN — LIDOCAINE HYDROCHLORIDE 18 ML: 10 INJECTION, SOLUTION INFILTRATION; PERINEURAL at 14:05

## 2017-01-01 RX ADMIN — SODIUM CHLORIDE 100 ML/HR: 4.5 INJECTION, SOLUTION INTRAVENOUS at 00:59

## 2017-01-01 RX ADMIN — LEVETIRACETAM 500 MG: 500 TABLET, FILM COATED ORAL at 08:43

## 2017-01-01 RX ADMIN — ENOXAPARIN SODIUM 60 MG: 60 INJECTION SUBCUTANEOUS at 00:01

## 2017-01-01 RX ADMIN — WARFARIN SODIUM 5 MG: 5 TABLET ORAL at 18:01

## 2017-01-01 RX ADMIN — INSULIN ASPART 5 UNITS: 100 INJECTION, SOLUTION INTRAVENOUS; SUBCUTANEOUS at 21:53

## 2017-01-01 RX ADMIN — LEVETIRACETAM 500 MG: 500 TABLET, FILM COATED ORAL at 09:06

## 2017-01-01 RX ADMIN — NADOLOL 20 MG: 40 TABLET ORAL at 00:13

## 2017-01-01 RX ADMIN — ATORVASTATIN CALCIUM 10 MG: 10 TABLET, FILM COATED ORAL at 09:18

## 2017-01-01 RX ADMIN — NYSTATIN 500000 UNITS: 100000 SUSPENSION ORAL at 11:26

## 2017-01-01 RX ADMIN — PROPOFOL 140 MCG/KG/MIN: 10 INJECTION, EMULSION INTRAVENOUS at 14:05

## 2017-01-01 RX ADMIN — MICONAZOLE NITRATE: 2 PASTE TOPICAL at 23:53

## 2017-01-01 RX ADMIN — TRAZODONE HYDROCHLORIDE 50 MG: 50 TABLET ORAL at 21:21

## 2017-01-01 RX ADMIN — DONEPEZIL HYDROCHLORIDE 5 MG: 5 TABLET, FILM COATED ORAL at 21:06

## 2017-01-01 RX ADMIN — TRAZODONE HYDROCHLORIDE 50 MG: 50 TABLET ORAL at 21:10

## 2017-01-01 RX ADMIN — INSULIN ASPART 4 UNITS: 100 INJECTION, SOLUTION INTRAVENOUS; SUBCUTANEOUS at 17:29

## 2017-01-01 RX ADMIN — Medication 250 MG: at 11:26

## 2017-01-01 RX ADMIN — LEVETIRACETAM 500 MG: 500 TABLET, FILM COATED ORAL at 08:41

## 2017-01-01 RX ADMIN — NYSTATIN 500000 UNITS: 100000 SUSPENSION ORAL at 08:11

## 2017-01-30 PROBLEM — D51.9 ANEMIA, B12 DEFICIENCY: Status: ACTIVE | Noted: 2017-01-01

## 2017-01-30 NOTE — LETTER
January 30, 2017     Gigi Gillespie Jr., MD  [address]  [facility]. [address] [place], [place] [id number]    Patient: Alfredo Gonzalez   YOB: 1940   Date of Visit: 1/30/2017       Dear Dr. Verna MD:    Thank you for referring Alfredo Gonzalez to me for evaluation. Below are the relevant portions of my assessment and plan of care.    If you have questions, please do not hesitate to call me. I look forward to following Alfredo along with you.         Sincerely,        Jean Fajardo MD        CC: No Recipients  Jean Fajardo MD  1/30/2017  3:30 PM  Signed    Subjective .  Modest improvement in fatigue    REASONS FOR FOLLOWUP:    1. Previous assessment per superior sagittal sinus thrombosis, left frontal intracerebral hemorrhage, acute calf vein thrombosis, hypercoagulable assessment positive for heterozygosity for factor V 5 Leiden gene mutation.   2. Ongoing anticoagulation with Coumadin followed by Dr. Gillespie.   3. Admission on 08/04/2014 with severe right knee pain, spontaneous infusion, calf vein thrombosis despite therapeutic anticoagulation.   4. Repeat consultation 08/05/2014 leading to chest CT revealing irregula r nodule in the right apex indeterminate ? malignancy.   5. Thoracic surgery consultation, Neurovascular surgery consultation obtained, IVC filter placed, exploratory right video assisted fluoroscopy with wedge resection right upper lobe lymphadenectomy 08/15/ 2014, pathology consistent with primary lung adenocarcinoma, stage [id number] stage IA, EGFR mutation, positive for Exon 19 mutation, (potentially responsive to EGFR tyrosine kinase therapy).   6. The patient was seen in the office 09/23/2014, adjuvant therapy not felt necessary, IVC filter to be removed, continue Xarelto therapy thereafter recommended  7. Patient reviewed December 21, 2016 with ongoing anemia-microcytic, hypochromic  8. Determination of considerable iron deficiency and B12 deficiency?  Pernicious anemia, replacement  therapy is initiated    HISTORY OF PRESENT ILLNESS:  The patient is a 76 y.o. year old male who is here for follow-up with the above-mentioned history.    History of Present Illness     Mr. Gonzalez is a 76-year-old male who we had initially seen in consultation 02/04 through 02/14/2002 having presented at that point with a superior sagittal sinus thrombosis and left frontal intracerebral hemorrhage. Evaluation thereafter included a hypercoagulable assessment when he was found also to have an acute calf vein thrombosis and spontaneous calf vein thrombosis on the left . The patient fortunately responded well to rehabilitation and anticoagulation. He was eventually placed on Coumadin long term and hypercoagulable assessment had revealed findings consistent with heterozygosity for factor V Leiden gene mutation. The patie nt did not have followup with us, though that is not exactly certain why.       Mr. Gonzalez was later admitted 08/04/2014 through Dr. Gillespie. The patient had gone to [employer] and while standing in [employer] he noticed his right leg starting to cramp. He began to have noticeable limping thereafter and then when knee pain became so severe his wife called 911 and he was brought to [facility] emergency room. In the ER he was found to have calf vein thrombosis involving the right leg as well as a swollen k n ee with effusion. He was admitted to be seen by Orthopedics and was found to incidentally have a prothrombin time 3.1 and thus we were asked to see him for his thrombosis despite adequate anticoagulation. At that point, he was seen by this physician and a s a result of there being concern about additional factor such as a malignancy, a CT chest, abdomen and pelvis was obtained 08/05/2014. This revealed unexpectedly an irregular 2.0 x 1.1 cm lesion in the right apical region. This was concerning for a malig n jorge. He also had non-obstructing left renal calculus and extensive lumbar spinal  degenerative disease. The patient was seen by Orthopedics again with a knee tap. (Followup is still remaining concerning this). The patient was also seen by Thoracic surger y . Please note that Dopplers 08/06/2014 revealed a subacute venous thrombosis in the right popliteal vein as well as subacute thrombus in the right calf vein. The patient was seen by Dr. Meyer per Thoracic surgery and a PET scan was obtained showing the no d ule in right lung apex to demonstrate low level activity somewhat indeterminate with an SUV of 2.0. No other abnormalities were present. It was felt that the patient should proceed to surgical resection. An IVC filter was placed prior to surgery 08/13/201 4 by Dr. Pastor Trinh. PFTs revealed a moderate airway restriction and after discussion on 08/15/2014 the patient underwent exploratory bronchoscopy, exploratory right video assisted thoracoscopy with wedge resection, right upper lobe and lymphadenectom y . The patient remains hospitalized at this point to have the surgery rather than return for subsequent surgery. Pathology per surgical specimen revealed a primary lung adenocarcinoma, demonstrating a lepidic growth pattern. R4, station 7 and R10 nodes wer e all negative for disease. The tumor size was 2.1 x 1.3 x 1.2 unifocal adenocarcinoma. His pathologic staging was a pT1b pN0M0- stage IA. Now available also at the time of this dictation is the finding that the tumor was EGFR mutation positive with a le s ion positive for E746 [id number] mutation, Exon 19. These are reported to correlate with responsiveness to EGFR tyrosine kinase inhibitor therapies. The patient was seen by Dr. Meyer postoperative and is doing well and is also being seen by Dr. Trinh with consideration of removal of his IVC filter.       The patient now presents back to our practice for review of all of this. We discussed his time line up to this point. In a long discussion it is felt that adjuvant chemotherapy is not  warranted in Mr. Gonzalez's care. Reasonably followup is however likely with at least a chest x-ray on a yearly basis possibly through Dr. Gillespie.       The patient's had follow-up through Dr. CHRISTIAN Walker since his last visit here as well as reviews to Dr. Meyer.  This includes evidently a trial of oral iron when the patient was found to be anemic previously as well as recognition of the patient's kidney function-CKD 3.  She also was seen in emergency room in June of this year after falling from a ladder with laboratory studies revealing anemia with hemoglobin 8.5, hematocrit 30.5 and MCV that  63.3 with MCH of 17.6 and an MCHC of 27.9, platelet count of 333,000 with normal differential.  In reviewing the patient's blood counts he is definitely developed microcytosis and hypochromia since June and, as such, when immediately as concerned about iron deficiency as an underlying issue.  He also has chronic leukocytosis that appears to be unchanged from previous.  We discussed IV iron as a treatment to address the immediate issue and thereafter try to determine the source of blood loss if indeed iron deficiency is documented.     The patient went on to be treated with both IV Feraheme ×2 on December 23 and December 30 as well as be given injections of B12 both visits.  As he seen back today January 30 he is feeling some better and his wife has also noticed a difference.  We plan to continue the B12 loading over the next several weeks as well as assessing him for pernicious anemia laboratories today.    Past Medical History   Diagnosis Date   • Arthralgia    • Arthritis    • Back pain    • Chronic mixed headache syndrome    • Deep venous thrombosis of right popliteal vein      of right knee hemarthrosis   • Diabetes mellitus    • Diverticulosis    • DJD (degenerative joint disease)    • Factor V Leiden    • GERD (gastroesophageal reflux disease)    • Hypercholesterolemia    • Hypertension    • Intracerebral hemorrhage    •  "Lumbar spinal stenosis    • Muscular aches    • Neck pain    • Non-small cell carcinoma of lung      Stage IA, EGFR mutated   • Obesity    • Peripheral neuropathy    • Prostate cancer      Status post seed implant for radiation therapy   • Shortness of breath    • Stroke      H/O CVA in 2012.   • Thrombosis      Lower extremity       ONCOLOGIC HISTORY:  (History from previous dates can be found in the separate document.)    Current Outpatient Prescriptions on File Prior to Visit   Medication Sig Dispense Refill   • Calcium Carbonate-Vitamin D (CALCIUM-D) 600-400 MG-UNIT tablet Take 1 tablet by mouth daily.     • HYDROcodone-acetaminophen (NORCO) 5-325 MG per tablet Take 1 tablet by mouth every 6 (six) hours as needed.     • insulin detemir (LEVEMIR) 100 UNIT/ML injection Inject 20 Units under the skin every night.     • Insulin Syringe-Needle U-100 (RELION INSULIN SYR 0.5ML/31G) 31G X 5/16\" 0.5 ML misc      • irbesartan-hydrochlorothiazide (AVALIDE) 300-12.5 MG tablet Take 1 tablet by mouth daily.     • levETIRAcetam (KEPPRA) 500 MG tablet Take  by mouth 2 (two) times a day.     • NOVOLOG FLEXPEN 100 UNIT/ML solution pen-injector sc pen 3 (three) times a day with meals. Sliding scale     • oxybutynin (DITROPAN) 5 MG tablet Take  by mouth daily.     • pravastatin (PRAVACHOL) 80 MG tablet Take  by mouth daily.     • rivaroxaban (XARELTO) tablet Take 20 mg by mouth daily with dinner.     • vitamin B-12 (CYANOCOBALAMIN) 1000 MCG tablet Take 1,000 mcg by mouth.     • vitamin B-6 (PYRIDOXINE) 100 MG tablet Take 100 mg by mouth.     • [DISCONTINUED] Cyanocobalamin (VITAMIN B12 PO) Take  by mouth daily.     • [DISCONTINUED] Pyridoxine HCl (VITAMIN B-6 PO) Take  by mouth daily.       No current facility-administered medications on file prior to visit.        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     Social History Main Topics   • Smoking status: Former Smoker     Packs/day: 1.00     Years: 20.00     Quit date: 2/8/1996   • Smokeless tobacco: Not on file   • Alcohol use No   • Drug use: No   • Sexual activity: Not on file     Other Topics Concern   • Not on file     Social History Narrative         Cancer-related family history includes Colon cancer in his mother; Lung cancer (age of onset: 77) in his brother.     Review of Systems  A comprehensive 14 point review of systems was performed and was negative except as mentioned.    Objective      Vitals:    01/30/17 1425   BP: 128/68   Pulse: 73   Resp: 12   Temp: 98.2 °F (36.8 °C)   TempSrc: Oral   SpO2: 95%   Weight: 171 lb (77.6 kg)   Height: 69\" (175.3 cm)   PainSc: 0-No pain     Current Status 1/30/2017   ECOG score 0       Physical Exam    GENERAL: Well-developed, well-nourished male in no acute distress.   SKIN: Warm, dry without rashes, purpura or petechiae.   HEAD: Normocephalic.   EYES: Pupils equal, round and reactive to light. EOMs intact. Conjunctivae normal.   EARS: Hearing intact.   NOSE: Septum midline. No excoriations or nasal discharge.   MOUTH: Tongue is well papillated; no stomatitis or ulcers. Lips normal.   THROAT: Oropharynx without lesions or exudates.   NECK: Supple with good range of motion; no thyromegaly or masses, no JVD or bruits.   LYMPHATICS: No cervical, supraclavicular, axillary or inguinal adenopathy.   CHEST: Decreased breath sounds bilateral bases.   CARDIAC: Status post right video assisted thoracoscopy with healed incision sites.   ABDOMEN: Soft, nontender with no organomegaly or masses.   EXTREMITIES: Without palpable cords or positive Lucius's signs No clubbing, cyanosis or edema.   NEURO: No focal neurological deficits.         RECENT LABS:  Hematology WBC   Date Value " Ref Range Status   01/30/2017 12.81 (H) 4.00 - 10.00 10*3/mm3 Final   08/18/2014 14.41 (H) 4.50 - 10.70 K/Cumm Final     RBC   Date Value Ref Range Status   01/30/2017 5.89 (H) 4.30 - 5.50 10*6/mm3 Final   08/18/2014 4.16 (L) 4.60 - 6.00 Million Final     HEMOGLOBIN   Date Value Ref Range Status   01/30/2017 13.5 13.5 - 16.5 g/dL Final   08/18/2014 11.0 (L) 13.7 - 17.6 g/dL Final     HEMATOCRIT   Date Value Ref Range Status   01/30/2017 45.2 40.0 - 49.0 % Final   08/18/2014 35.0 (L) 40.4 - 52.2 % Final     PLATELETS   Date Value Ref Range Status   01/30/2017 359 150 - 375 10*3/mm3 Final   08/18/2014 224 140 - 500 K/Cumm Final        Assessment/Plan     76-year-old male with previous history of factor V Leiden gene mutation discovered during a previous hospitalization. The patient had a CVA as well as a lower extremity thrombosis. He has been on Coumadin following this without complication. Unfortunately later developed right popliteal venous thrombosis of his right knee hemarthrosis and was found on assessment to have a suspicious right upper lobe pulmonary nodule. After review in part by CT and PET as well as Thoracic and Vascular consulta t ion, he was felt a candidate for resection of what is felt almost certainly to be a malignancy. We therefore proceeded to be video assisted thoracoscopy 08/15/2014 with wedge resection of right upper lobe, 08/15/2014. Final pathology again has revealed a stage IA non-small cell lung carcinoma-adenocarcinoma, which is EGFR mutated as described. Please note the patient had IVC filter placed prior to surgery as well.  The patient thereafter was asked to continue on Xarelto indefinitely as well as have his IVC filter removed.  This was the case and he is thereafter been seen by both Dr. Kenney and Dr. Meyer on a regular basis.    The patient now presents with evidence of worseanemia-hypochromic, microcytic.  We plan to assess him with several laboratory studies which are now becoming  available.  At the time of this dictation the patient's iron is 18, TIBC is 452 with an iron saturation of 4 and a ferritin of 6.5.  The patient therefore considerably iron deficient.  We proceed to give IV Feraheme both December 23 and December 30 as well as B12 IM injections.  We find that he still is some degree of iron deficiency when reviewed January 30 and may need follow-up endoscopy.  The patient states that this has been done within the last year?.  A copy will be sent to the patient's primary care concerning this.  We will plan:  1.  IV Feraheme and B12 injection today  2.  B12 injection every week ×3  3.  Intrinsic factor antibody, antiparietal cell antibody today  4.  Reassessment in 4 weeks with repeat iron and B12 studies  5.  General reassessment in 5 weeks for possible further B12 and IV Feraheme as needed  6.  A copy will be sent to Dr. Gillespie, ?  GI assessment

## 2017-01-30 NOTE — PROGRESS NOTES
Subjective .  Modest improvement in fatigue    REASONS FOR FOLLOWUP:    1. Previous assessment per superior sagittal sinus thrombosis, left frontal intracerebral hemorrhage, acute calf vein thrombosis, hypercoagulable assessment positive for heterozygosity for factor V 5 Leiden gene mutation.   2. Ongoing anticoagulation with Coumadin followed by Dr. Gillespie.   3. Admission on 08/04/2014 with severe right knee pain, spontaneous infusion, calf vein thrombosis despite therapeutic anticoagulation.   4. Repeat consultation 08/05/2014 leading to chest CT revealing irregula r nodule in the right apex indeterminate ? malignancy.   5. Thoracic surgery consultation, Neurovascular surgery consultation obtained, IVC filter placed, exploratory right video assisted fluoroscopy with wedge resection right upper lobe lymphadenectomy 08/15/ 2014, pathology consistent with primary lung adenocarcinoma, stage [id number]- stage IA, EGFR mutation, positive for Exon 19 mutation, (potentially responsive to EGFR tyrosine kinase therapy).   6. The patient was seen in the office 09/23/2014, adjuvant therapy not felt necessary, IVC filter to be removed, continue Xarelto therapy thereafter recommended  7. Patient reviewed December 21, 2016 with ongoing anemia-microcytic, hypochromic  8. Determination of considerable iron deficiency and B12 deficiency?  Pernicious anemia, replacement therapy is initiated    HISTORY OF PRESENT ILLNESS:  The patient is a 76 y.o. year old male who is here for follow-up with the above-mentioned history.    History of Present Illness     Mr. Gonzalez is a 76-year-old male who we had initially seen in consultation 02/04 through 02/14/2002 having presented at that point with a superior sagittal sinus thrombosis and left frontal intracerebral hemorrhage. Evaluation thereafter included a hypercoagulable assessment when he was found also to have an acute calf vein thrombosis and spontaneous calf vein thrombosis on the left .  The patient fortunately responded well to rehabilitation and anticoagulation. He was eventually placed on Coumadin long term and hypercoagulable assessment had revealed findings consistent with heterozygosity for factor V Leiden gene mutation. The patie nt did not have followup with us, though that is not exactly certain why.       Mr. Gonzalez was later admitted 08/04/2014 through Dr. Gillespie. The patient had gone to [employer] and while standing in [employer] he noticed his right leg starting to cramp. He began to have noticeable limping thereafter and then when knee pain became so severe his wife called 911 and he was brought to [facility] emergency room. In the ER he was found to have calf vein thrombosis involving the right leg as well as a swollen k n ee with effusion. He was admitted to be seen by Orthopedics and was found to incidentally have a prothrombin time 3.1 and thus we were asked to see him for his thrombosis despite adequate anticoagulation. At that point, he was seen by this physician and a s a result of there being concern about additional factor such as a malignancy, a CT chest, abdomen and pelvis was obtained 08/05/2014. This revealed unexpectedly an irregular 2.0 x 1.1 cm lesion in the right apical region. This was concerning for a malig n jorge. He also had non-obstructing left renal calculus and extensive lumbar spinal degenerative disease. The patient was seen by Orthopedics again with a knee tap. (Followup is still remaining concerning this). The patient was also seen by Thoracic surgeangely joyner . Please note that Dopplers 08/06/2014 revealed a subacute venous thrombosis in the right popliteal vein as well as subacute thrombus in the right calf vein. The patient was seen by Dr. Meyer per Thoracic surgery and a PET scan was obtained showing the no d ule in right lung apex to demonstrate low level activity somewhat indeterminate with an SUV of 2.0. No other abnormalities were present. It was felt  that the patient should proceed to surgical resection. An IVC filter was placed prior to surgery 08/13/201 4 by Dr. Pastor Trinh. PFTs revealed a moderate airway restriction and after discussion on 08/15/2014 the patient underwent exploratory bronchoscopy, exploratory right video assisted thoracoscopy with wedge resection, right upper lobe and lymphadenectom y . The patient remains hospitalized at this point to have the surgery rather than return for subsequent surgery. Pathology per surgical specimen revealed a primary lung adenocarcinoma, demonstrating a lepidic growth pattern. R4, station 7 and R10 nodes wer e all negative for disease. The tumor size was 2.1 x 1.3 x 1.2 unifocal adenocarcinoma. His pathologic staging was a pT1b pN0M0- stage IA. Now available also at the time of this dictation is the finding that the tumor was EGFR mutation positive with a le s ion positive for E746 [id number] mutation, Exon 19. These are reported to correlate with responsiveness to EGFR tyrosine kinase inhibitor therapies. The patient was seen by Dr. Meyer postoperative and is doing well and is also being seen by Dr. Trinh with consideration of removal of his IVC filter.       The patient now presents back to our practice for review of all of this. We discussed his time line up to this point. In a long discussion it is felt that adjuvant chemotherapy is not warranted in Mr. Gonzalez's care. Reasonably followup is however likely with at least a chest x-ray on a yearly basis possibly through Dr. Gillespie.       The patient's had follow-up through Dr. CHRISTIAN Walker since his last visit here as well as reviews to Dr. Meyer.  This includes evidently a trial of oral iron when the patient was found to be anemic previously as well as recognition of the patient's kidney function-CKD 3.  She also was seen in emergency room in June of this year after falling from a ladder with laboratory studies revealing anemia with hemoglobin 8.5, hematocrit 30.5  and MCV that DrRebeka 63.3 with MCH of 17.6 and an MCHC of 27.9, platelet count of 333,000 with normal differential.  In reviewing the patient's blood counts he is definitely developed microcytosis and hypochromia since June and, as such, when immediately as concerned about iron deficiency as an underlying issue.  He also has chronic leukocytosis that appears to be unchanged from previous.  We discussed IV iron as a treatment to address the immediate issue and thereafter try to determine the source of blood loss if indeed iron deficiency is documented.     The patient went on to be treated with both IV Feraheme ×2 on December 23 and December 30 as well as be given injections of B12 both visits.  As he seen back today January 30 he is feeling some better and his wife has also noticed a difference.  We plan to continue the B12 loading over the next several weeks as well as assessing him for pernicious anemia laboratories today.    Past Medical History   Diagnosis Date   • Arthralgia    • Arthritis    • Back pain    • Chronic mixed headache syndrome    • Deep venous thrombosis of right popliteal vein      of right knee hemarthrosis   • Diabetes mellitus    • Diverticulosis    • DJD (degenerative joint disease)    • Factor V Leiden    • GERD (gastroesophageal reflux disease)    • Hypercholesterolemia    • Hypertension    • Intracerebral hemorrhage    • Lumbar spinal stenosis    • Muscular aches    • Neck pain    • Non-small cell carcinoma of lung      Stage IA, EGFR mutated   • Obesity    • Peripheral neuropathy    • Prostate cancer      Status post seed implant for radiation therapy   • Shortness of breath    • Stroke      H/O CVA in 2012.   • Thrombosis      Lower extremity       ONCOLOGIC HISTORY:  (History from previous dates can be found in the separate document.)    Current Outpatient Prescriptions on File Prior to Visit   Medication Sig Dispense Refill   • Calcium Carbonate-Vitamin D (CALCIUM-D) 600-400 MG-UNIT tablet  "Take 1 tablet by mouth daily.     • HYDROcodone-acetaminophen (NORCO) 5-325 MG per tablet Take 1 tablet by mouth every 6 (six) hours as needed.     • insulin detemir (LEVEMIR) 100 UNIT/ML injection Inject 20 Units under the skin every night.     • Insulin Syringe-Needle U-100 (RELION INSULIN SYR 0.5ML/31G) 31G X 5/16\" 0.5 ML misc      • irbesartan-hydrochlorothiazide (AVALIDE) 300-12.5 MG tablet Take 1 tablet by mouth daily.     • levETIRAcetam (KEPPRA) 500 MG tablet Take  by mouth 2 (two) times a day.     • NOVOLOG FLEXPEN 100 UNIT/ML solution pen-injector sc pen 3 (three) times a day with meals. Sliding scale     • oxybutynin (DITROPAN) 5 MG tablet Take  by mouth daily.     • pravastatin (PRAVACHOL) 80 MG tablet Take  by mouth daily.     • rivaroxaban (XARELTO) tablet Take 20 mg by mouth daily with dinner.     • vitamin B-12 (CYANOCOBALAMIN) 1000 MCG tablet Take 1,000 mcg by mouth.     • vitamin B-6 (PYRIDOXINE) 100 MG tablet Take 100 mg by mouth.     • [DISCONTINUED] Cyanocobalamin (VITAMIN B12 PO) Take  by mouth daily.     • [DISCONTINUED] Pyridoxine HCl (VITAMIN B-6 PO) Take  by mouth daily.       No current facility-administered medications on file prior to visit.        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     Social History Main Topics   • Smoking status: Former Smoker     Packs/day: 1.00     Years: 20.00     Quit date: 2/8/1996   • Smokeless tobacco: Not on file   • Alcohol use No   • Drug use: No   • Sexual activity: Not on file     Other Topics Concern   • Not on file     Social History Narrative         Cancer-related family history includes Colon cancer in his mother; Lung cancer (age of onset: 77) in his brother. " "    Review of Systems  A comprehensive 14 point review of systems was performed and was negative except as mentioned.    Objective      Vitals:    01/30/17 1425   BP: 128/68   Pulse: 73   Resp: 12   Temp: 98.2 °F (36.8 °C)   TempSrc: Oral   SpO2: 95%   Weight: 171 lb (77.6 kg)   Height: 69\" (175.3 cm)   PainSc: 0-No pain     Current Status 1/30/2017   ECOG score 0       Physical Exam    GENERAL: Well-developed, well-nourished male in no acute distress.   SKIN: Warm, dry without rashes, purpura or petechiae.   HEAD: Normocephalic.   EYES: Pupils equal, round and reactive to light. EOMs intact. Conjunctivae normal.   EARS: Hearing intact.   NOSE: Septum midline. No excoriations or nasal discharge.   MOUTH: Tongue is well papillated; no stomatitis or ulcers. Lips normal.   THROAT: Oropharynx without lesions or exudates.   NECK: Supple with good range of motion; no thyromegaly or masses, no JVD or bruits.   LYMPHATICS: No cervical, supraclavicular, axillary or inguinal adenopathy.   CHEST: Decreased breath sounds bilateral bases.   CARDIAC: Status post right video assisted thoracoscopy with healed incision sites.   ABDOMEN: Soft, nontender with no organomegaly or masses.   EXTREMITIES: Without palpable cords or positive Lucius's signs No clubbing, cyanosis or edema.   NEURO: No focal neurological deficits.         RECENT LABS:  Hematology WBC   Date Value Ref Range Status   01/30/2017 12.81 (H) 4.00 - 10.00 10*3/mm3 Final   08/18/2014 14.41 (H) 4.50 - 10.70 K/Cumm Final     RBC   Date Value Ref Range Status   01/30/2017 5.89 (H) 4.30 - 5.50 10*6/mm3 Final   08/18/2014 4.16 (L) 4.60 - 6.00 Million Final     HEMOGLOBIN   Date Value Ref Range Status   01/30/2017 13.5 13.5 - 16.5 g/dL Final   08/18/2014 11.0 (L) 13.7 - 17.6 g/dL Final     HEMATOCRIT   Date Value Ref Range Status   01/30/2017 45.2 40.0 - 49.0 % Final   08/18/2014 35.0 (L) 40.4 - 52.2 % Final     PLATELETS   Date Value Ref Range Status   01/30/2017 359 150 - " 375 10*3/mm3 Final   08/18/2014 224 140 - 500 K/Cumm Final        Assessment/Plan     76-year-old male with previous history of factor V Leiden gene mutation discovered during a previous hospitalization. The patient had a CVA as well as a lower extremity thrombosis. He has been on Coumadin following this without complication. Unfortunately later developed right popliteal venous thrombosis of his right knee hemarthrosis and was found on assessment to have a suspicious right upper lobe pulmonary nodule. After review in part by CT and PET as well as Thoracic and Vascular consulta t ion, he was felt a candidate for resection of what is felt almost certainly to be a malignancy. We therefore proceeded to be video assisted thoracoscopy 08/15/2014 with wedge resection of right upper lobe, 08/15/2014. Final pathology again has revealed a stage IA non-small cell lung carcinoma-adenocarcinoma, which is EGFR mutated as described. Please note the patient had IVC filter placed prior to surgery as well.  The patient thereafter was asked to continue on Xarelto indefinitely as well as have his IVC filter removed.  This was the case and he is thereafter been seen by both Dr. Kenney and Dr. Meyer on a regular basis.    The patient now presents with evidence of worseanemia-hypochromic, microcytic.  We plan to assess him with several laboratory studies which are now becoming available.  At the time of this dictation the patient's iron is 18, TIBC is 452 with an iron saturation of 4 and a ferritin of 6.5.  The patient therefore considerably iron deficient.  We proceed to give IV Feraheme both December 23 and December 30 as well as B12 IM injections.  We find that he still is some degree of iron deficiency when reviewed January 30 and may need follow-up endoscopy.  The patient states that this has been done within the last year?.  A copy will be sent to the patient's primary care concerning this.  We will plan:  1.  IV Feraheme and B12  injection today  2.  B12 injection every week ×3  3.  Intrinsic factor antibody, antiparietal cell antibody today  4.  Reassessment in 4 weeks with repeat iron and B12 studies  5.  General reassessment in 5 weeks for possible further B12 and IV Feraheme as needed  6.  A copy will be sent to Dr. Gillespie, ?  GI assessment

## 2017-01-30 NOTE — PROGRESS NOTES
Spoke with Dr Fajardo to verify if pt to receive B12 injections starting today and per MD - pt to receive B12 today.  Pt also to have Intrinsic Factor Ab and Anti-parietal antibody labs drawn per MD.  Pt had labs drawn prior to coming back to lab and RN called lab, spoke with Lilibeth in lab and she stated that they had enough blood to run both of the labs.  No extra tubes of blood drawn.

## 2017-03-03 PROBLEM — D50.9 IRON DEFICIENCY ANEMIA: Status: ACTIVE | Noted: 2017-01-01

## 2017-03-08 NOTE — PROGRESS NOTES
Subjective .  Modest improvement in fatigue , worsening bilateral foot pain?    REASONS FOR FOLLOWUP:    1. Previous assessment per superior sagittal sinus thrombosis, left frontal intracerebral hemorrhage, acute calf vein thrombosis, hypercoagulable assessment positive for heterozygosity for factor V 5 Leiden gene mutation.   2. Ongoing anticoagulation with Coumadin followed by Dr. Gillespie.   3. Admission on 08/04/2014 with severe right knee pain, spontaneous infusion, calf vein thrombosis despite therapeutic anticoagulation.   4. Repeat consultation 08/05/2014 leading to chest CT revealing irregula r nodule in the right apex indeterminate ? malignancy.   5. Thoracic surgery consultation, Neurovascular surgery consultation obtained, IVC filter placed, exploratory right video assisted fluoroscopy with wedge resection right upper lobe lymphadenectomy 08/15/ 2014, pathology consistent with primary lung adenocarcinoma, stage [id number]- stage IA, EGFR mutation, positive for Exon 19 mutation, (potentially responsive to EGFR tyrosine kinase therapy).   6. The patient was seen in the office 09/23/2014, adjuvant therapy not felt necessary, IVC filter to be removed, continue Xarelto therapy thereafter recommended  7. Patient reviewed December 21, 2016 with ongoing anemia-microcytic, hypochromic  8. Determination of considerable iron deficiency and B12 deficiency?  Pernicious anemia, replacement therapy is initiated  9. Patient reviewed March 8, considerable improvement per anemia though iron deficiency persists, patient with progressive foot pain, foot lesions developing, follow-up PET/CT planned    HISTORY OF PRESENT ILLNESS:  The patient is a 76 y.o. year old male who is here for follow-up with the above-mentioned history.    History of Present Illness     Mr. Gonzalez is a 76-year-old male who we had initially seen in consultation 02/04 through 02/14/2002 having presented at that point with a superior sagittal sinus  thrombosis and left frontal intracerebral hemorrhage. Evaluation thereafter included a hypercoagulable assessment when he was found also to have an acute calf vein thrombosis and spontaneous calf vein thrombosis on the left . The patient fortunately responded well to rehabilitation and anticoagulation. He was eventually placed on Coumadin long term and hypercoagulable assessment had revealed findings consistent with heterozygosity for factor V Leiden gene mutation. The patie nt did not have followup with us, though that is not exactly certain why.       Mr. Gonzalez was later admitted 08/04/2014 through Dr. Gillespie. The patient had gone to [employer] and while standing in [employer] he noticed his right leg starting to cramp. He began to have noticeable limping thereafter and then when knee pain became so severe his wife called 911 and he was brought to [facility] emergency room. In the ER he was found to have calf vein thrombosis involving the right leg as well as a swollen k n ee with effusion. He was admitted to be seen by Orthopedics and was found to incidentally have a prothrombin time 3.1 and thus we were asked to see him for his thrombosis despite adequate anticoagulation. At that point, he was seen by this physician and a s a result of there being concern about additional factor such as a malignancy, a CT chest, abdomen and pelvis was obtained 08/05/2014. This revealed unexpectedly an irregular 2.0 x 1.1 cm lesion in the right apical region. This was concerning for a malig n jorge. He also had non-obstructing left renal calculus and extensive lumbar spinal degenerative disease. The patient was seen by Orthopedics again with a knee tap. (Followup is still remaining concerning this). The patient was also seen by Thoracic surgeangely joyner . Please note that Dopplers 08/06/2014 revealed a subacute venous thrombosis in the right popliteal vein as well as subacute thrombus in the right calf vein. The patient was seen by  Dr. Meyer per Thoracic surgery and a PET scan was obtained showing the no d ule in right lung apex to demonstrate low level activity somewhat indeterminate with an SUV of 2.0. No other abnormalities were present. It was felt that the patient should proceed to surgical resection. An IVC filter was placed prior to surgery 08/13/201 4 by Dr. Pastor Trinh. PFTs revealed a moderate airway restriction and after discussion on 08/15/2014 the patient underwent exploratory bronchoscopy, exploratory right video assisted thoracoscopy with wedge resection, right upper lobe and lymphadenectom y . The patient remains hospitalized at this point to have the surgery rather than return for subsequent surgery. Pathology per surgical specimen revealed a primary lung adenocarcinoma, demonstrating a lepidic growth pattern. R4, station 7 and R10 nodes wer e all negative for disease. The tumor size was 2.1 x 1.3 x 1.2 unifocal adenocarcinoma. His pathologic staging was a pT1b pN0M0- stage IA. Now available also at the time of this dictation is the finding that the tumor was EGFR mutation positive with a le s ion positive for E746 [id number] mutation, Exon 19. These are reported to correlate with responsiveness to EGFR tyrosine kinase inhibitor therapies. The patient was seen by Dr. Meyer postoperative and is doing well and is also being seen by Dr. Trinh with consideration of removal of his IVC filter.       The patient now presents back to our practice for review of all of this. We discussed his time line up to this point. In a long discussion it is felt that adjuvant chemotherapy is not warranted in Mr. Gonzalez's care. Reasonably followup is however likely with at least a chest x-ray on a yearly basis possibly through Dr. Gillespie.       The patient's had follow-up through Dr. CHRISTIAN Walker since his last visit here as well as reviews to Dr. Meyer.  This includes evidently a trial of oral iron when the patient was found to be anemic previously as  well as recognition of the patient's kidney function-CKD 3.  She also was seen in emergency room in June of this year after falling from a ladder with laboratory studies revealing anemia with hemoglobin 8.5, hematocrit 30.5 and MCV that  63.3 with MCH of 17.6 and an MCHC of 27.9, platelet count of 333,000 with normal differential.  In reviewing the patient's blood counts he is definitely developed microcytosis and hypochromia since June and, as such, when immediately as concerned about iron deficiency as an underlying issue.  He also has chronic leukocytosis that appears to be unchanged from previous.  We discussed IV iron as a treatment to address the immediate issue and thereafter try to determine the source of blood loss if indeed iron deficiency is documented.     The patient went on to be treated with both IV Feraheme ×2 on December 23 and December 30 as well as be given injections of B12 both visits.  As he seen back today January 30 he is feeling some better and his wife has also noticed a difference.  We plan to continue the B12 loading over the next several weeks as well as assessing him for pernicious anemia laboratories today.  The patient is now seen back March 08, 2017.  While he's had some improvement in his energy level he still has pain in his feet which has progressed associated with a mildly raised, violaceous group of lesions involving his calf and left foot have developed.  He indicates he is to see a podiatrist in the near future and with the findings of pernicious anemia, ongoing iron deficiency, and unexplained rash developing in the setting of a history of non-small cell lung cancer I requested a PET/CT-?  Paraneoplasia or even new malignant process.    Past Medical History   Diagnosis Date   • Arthralgia    • Arthritis    • Back pain    • Chronic mixed headache syndrome    • Deep venous thrombosis of right popliteal vein      of right knee hemarthrosis   • Diabetes mellitus    •  "Diverticulosis    • DJD (degenerative joint disease)    • Factor V Leiden    • GERD (gastroesophageal reflux disease)    • Hypercholesterolemia    • Hypertension    • Intracerebral hemorrhage    • Lumbar spinal stenosis    • Muscular aches    • Neck pain    • Non-small cell carcinoma of lung      Stage IA, EGFR mutated   • Obesity    • Peripheral neuropathy    • Prostate cancer      Status post seed implant for radiation therapy   • Shortness of breath    • Stroke      H/O CVA in 2012.   • Thrombosis      Lower extremity       ONCOLOGIC HISTORY:  (History from previous dates can be found in the separate document.)    Current Outpatient Prescriptions on File Prior to Visit   Medication Sig Dispense Refill   • Calcium Carbonate-Vitamin D (CALCIUM-D) 600-400 MG-UNIT tablet Take 1 tablet by mouth daily.     • HYDROcodone-acetaminophen (NORCO) 5-325 MG per tablet Take 1 tablet by mouth every 6 (six) hours as needed.     • insulin detemir (LEVEMIR) 100 UNIT/ML injection Inject 20 Units under the skin every night.     • insulin glargine (LANTUS) 100 UNIT/ML injection Inject 12 Units under the skin.     • Insulin Syringe-Needle U-100 (RELION INSULIN SYR 0.5ML/31G) 31G X 5/16\" 0.5 ML misc      • irbesartan-hydrochlorothiazide (AVALIDE) 300-12.5 MG tablet Take 1 tablet by mouth daily.     • levETIRAcetam (KEPPRA) 500 MG tablet Take  by mouth 2 (two) times a day.     • NOVOLOG FLEXPEN 100 UNIT/ML solution pen-injector sc pen 3 (three) times a day with meals. Sliding scale     • oxybutynin (DITROPAN) 5 MG tablet Take  by mouth daily.     • pravastatin (PRAVACHOL) 80 MG tablet Take  by mouth daily.     • rivaroxaban (XARELTO) tablet Take 20 mg by mouth daily with dinner.     • vitamin B-12 (CYANOCOBALAMIN) 1000 MCG tablet Take 1,000 mcg by mouth.     • vitamin B-6 (PYRIDOXINE) 100 MG tablet Take 100 mg by mouth.       No current facility-administered medications on file prior to visit.        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     Social History Main Topics   • Smoking status: Former Smoker     Packs/day: 1.00     Years: 20.00     Quit date: 2/8/1996   • Smokeless tobacco: Not on file   • Alcohol use No   • Drug use: No   • Sexual activity: Not on file     Other Topics Concern   • Not on file     Social History Narrative         Cancer-related family history includes Colon cancer in his mother; Lung cancer (age of onset: 77) in his brother.     Review of Systems  A comprehensive 14 point review of systems was performed and was negative except as mentioned.    Objective      Vitals:    03/08/17 1247   BP: 110/72   Pulse: 62   Resp: 16   Temp: 98 °F (36.7 °C)   TempSrc: Oral   SpO2: 98%   Weight: 168 lb 12.8 oz (76.6 kg)   Height: 69\" (175.3 cm)   PainSc: 7  Comment: feet     Current Status 3/8/2017   ECOG score 0       Physical Exam    GENERAL: Well-developed, well-nourished male in no acute distress.   SKIN: Warm, dry, left lower calf with erythematous, mildly violaceous nonraised nonpruritic lesions, similar though smaller lesions involving right foot/metatarsal location  HEAD: Normocephalic.   EYES: Pupils equal, round and reactive to light. EOMs intact. Conjunctivae normal.   EARS: Hearing intact.   NOSE: Septum midline. No excoriations or nasal discharge.   MOUTH: Tongue is well papillated; no stomatitis or ulcers. Lips normal.   THROAT: Oropharynx without lesions or exudates.   NECK: Supple with good range of motion; no thyromegaly or masses, no JVD or bruits.   LYMPHATICS: No cervical, supraclavicular, axillary or inguinal adenopathy.   CHEST: Decreased breath sounds bilateral bases.   CARDIAC: Status post right video assisted " thoracoscopy with healed incision sites.   ABDOMEN: Soft, nontender with no organomegaly or masses.   EXTREMITIES: Without palpable cords or positive Lucius's signs No clubbing, cyanosis or edema.   NEURO: No focal neurological deficits.         RECENT LABS:  Hematology WBC   Date Value Ref Range Status   03/08/2017 14.92 (H) 4.00 - 10.00 10*3/mm3 Final   08/18/2014 14.41 (H) 4.50 - 10.70 K/Cumm Final     RBC   Date Value Ref Range Status   03/08/2017 6.04 (H) 4.30 - 5.50 10*6/mm3 Final   08/18/2014 4.16 (L) 4.60 - 6.00 Million Final     HEMOGLOBIN   Date Value Ref Range Status   03/08/2017 15.2 13.5 - 16.5 g/dL Final   08/18/2014 11.0 (L) 13.7 - 17.6 g/dL Final     HEMATOCRIT   Date Value Ref Range Status   03/08/2017 47.3 40.0 - 49.0 % Final   08/18/2014 35.0 (L) 40.4 - 52.2 % Final     PLATELETS   Date Value Ref Range Status   03/08/2017 325 150 - 375 10*3/mm3 Final   08/18/2014 224 140 - 500 K/Cumm Final        Assessment/Plan     76-year-old male with previous history of factor V Leiden gene mutation discovered during a previous hospitalization. The patient had a CVA as well as a lower extremity thrombosis. He has been on Coumadin following this without complication. Unfortunately later developed right popliteal venous thrombosis of his right knee hemarthrosis and was found on assessment to have a suspicious right upper lobe pulmonary nodule. After review in part by CT and PET as well as Thoracic and Vascular consulta t ion, he was felt a candidate for resection of what is felt almost certainly to be a malignancy. We therefore proceeded to be video assisted thoracoscopy 08/15/2014 with wedge resection of right upper lobe, 08/15/2014. Final pathology again has revealed a stage IA non-small cell lung carcinoma-adenocarcinoma, which is EGFR mutated as described. Please note the patient had IVC filter placed prior to surgery as well.  The patient thereafter was asked to continue on Xarelto indefinitely as well as  have his IVC filter removed.  This was the case and he is thereafter been seen by both Dr. Kenney and Dr. Meyer on a regular basis.    The patient now presents with evidence of worsening anemia-hypochromic, microcytic.  We plan to assess him with several laboratory studies which are now becoming available.  At the time of this dictation the patient's iron is 18, TIBC is 452 with an iron saturation of 4 and a ferritin of 6.5.  The patient therefore considerably iron deficient.  We proceed to give IV Feraheme both December 23 and December 30 as well as B12 IM injections.  We find that he still is some degree of iron deficiency when reviewed January 30 and may need follow-up endoscopy.  The patient states that this has been done within the last year?.      The patient is now next seen March 08, 2017.  Though he feels somewhat improved per his anemia is having worsening foot pain and unexplained lesions involving his feet.  He also clearly has pernicious anemia (note antiparietal cell antibodies) and ongoing iron deficiency (still at 9% saturation) despite iron infusions.  After discussion today we plan:  1.  IV iron today, B12 injection today  2.  PET/CT in 3 weeks-clarify potential paraneoplastic syndrome or even new malignancy versus recurrence  3.  M.D. assessment in 4 weeks for continued B12, reassessment of iron deficiency status  4.  The patient is be seen by podiatrist in the next week as well

## 2017-04-03 PROBLEM — D51.0 PERNICIOUS ANEMIA: Status: ACTIVE | Noted: 2017-01-01

## 2017-04-03 NOTE — PROGRESS NOTES
Subjective .  Modest improvement in fatigue , worsening bilateral foot pain?    REASONS FOR FOLLOWUP:    1. Previous assessment per superior sagittal sinus thrombosis, left frontal intracerebral hemorrhage, acute calf vein thrombosis, hypercoagulable assessment positive for heterozygosity for factor V 5 Leiden gene mutation.   2. Ongoing anticoagulation with Coumadin followed by Dr. Gillespie.   3. Admission on 08/04/2014 with severe right knee pain, spontaneous infusion, calf vein thrombosis despite therapeutic anticoagulation.   4. Repeat consultation 08/05/2014 leading to chest CT revealing irregula r nodule in the right apex indeterminate ? malignancy.   5. Thoracic surgery consultation, Neurovascular surgery consultation obtained, IVC filter placed, exploratory right video assisted fluoroscopy with wedge resection right upper lobe lymphadenectomy 08/15/ 2014, pathology consistent with primary lung adenocarcinoma, stage [id number] stage IA, EGFR mutation, positive for Exon 19 mutation, (potentially responsive to EGFR tyrosine kinase therapy).   6. The patient was seen in the office 09/23/2014, adjuvant therapy not felt necessary, IVC filter to be removed, continue Xarelto therapy thereafter recommended  7. Patient reviewed December 21, 2016 with ongoing anemia-microcytic, hypochromic  8. Determination of considerable iron deficiency and B12 deficiency?  Pernicious anemia, replacement therapy is initiated  9. Patient reviewed March 8, considerable improvement per anemia though iron deficiency persists, patient with progressive foot pain, foot lesions developing, follow-up scan obtained  10. Patient reviewed April 3, CT scans negative, anemia resolved, potential lower extremity vasculopathy-dermatologic assessment pursued    HISTORY OF PRESENT ILLNESS:  The patient is a 76 y.o. year old male who is here for follow-up with the above-mentioned history.    History of Present Illness     Mr. Gonzalez is a 76-year-old male  who we had initially seen in consultation 02/04 through 02/14/2002 having presented at that point with a superior sagittal sinus thrombosis and left frontal intracerebral hemorrhage. Evaluation thereafter included a hypercoagulable assessment when he was found also to have an acute calf vein thrombosis and spontaneous calf vein thrombosis on the left . The patient fortunately responded well to rehabilitation and anticoagulation. He was eventually placed on Coumadin long term and hypercoagulable assessment had revealed findings consistent with heterozygosity for factor V Leiden gene mutation. The patie nt did not have followup with us, though that is not exactly certain why.       Mr. Gonzalez was later admitted 08/04/2014 through Dr. Gillespie. The patient had gone to [employer] and while standing in [employer] he noticed his right leg starting to cramp. He began to have noticeable limping thereafter and then when knee pain became so severe his wife called 911 and he was brought to [facility] emergency room. In the ER he was found to have calf vein thrombosis involving the right leg as well as a swollen k n ee with effusion. He was admitted to be seen by Orthopedics and was found to incidentally have a prothrombin time 3.1 and thus we were asked to see him for his thrombosis despite adequate anticoagulation. At that point, he was seen by this physician and a s a result of there being concern about additional factor such as a malignancy, a CT chest, abdomen and pelvis was obtained 08/05/2014. This revealed unexpectedly an irregular 2.0 x 1.1 cm lesion in the right apical region. This was concerning for a malig n jorge. He also had non-obstructing left renal calculus and extensive lumbar spinal degenerative disease. The patient was seen by Orthopedics again with a knee tap. (Followup is still remaining concerning this). The patient was also seen by Thoracic surgeangely joyner . Please note that Dopplers 08/06/2014 revealed a  subacute venous thrombosis in the right popliteal vein as well as subacute thrombus in the right calf vein. The patient was seen by Dr. Meyer per Thoracic surgery and a PET scan was obtained showing the no d ule in right lung apex to demonstrate low level activity somewhat indeterminate with an SUV of 2.0. No other abnormalities were present. It was felt that the patient should proceed to surgical resection. An IVC filter was placed prior to surgery 08/13/201 4 by Dr. Pastor Trinh. PFTs revealed a moderate airway restriction and after discussion on 08/15/2014 the patient underwent exploratory bronchoscopy, exploratory right video assisted thoracoscopy with wedge resection, right upper lobe and lymphadenectom y . The patient remains hospitalized at this point to have the surgery rather than return for subsequent surgery. Pathology per surgical specimen revealed a primary lung adenocarcinoma, demonstrating a lepidic growth pattern. R4, station 7 and R10 nodes wer e all negative for disease. The tumor size was 2.1 x 1.3 x 1.2 unifocal adenocarcinoma. His pathologic staging was a pT1b pN0M0- stage IA. Now available also at the time of this dictation is the finding that the tumor was EGFR mutation positive with a le s ion positive for E746 [id number] mutation, Exon 19. These are reported to correlate with responsiveness to EGFR tyrosine kinase inhibitor therapies. The patient was seen by Dr. Meyer postoperative and is doing well and is also being seen by Dr. Trinh with consideration of removal of his IVC filter.       The patient now presents back to our practice for review of all of this. We discussed his time line up to this point. In a long discussion it is felt that adjuvant chemotherapy is not warranted in Mr. Gonzalez's care. Reasonably followup is however likely with at least a chest x-ray on a yearly basis possibly through Dr. Gillsepie.       The patient's had follow-up through Dr. CHRISTIAN Walker since his last visit here as  well as reviews to Dr. Meyer.  This includes evidently a trial of oral iron when the patient was found to be anemic previously as well as recognition of the patient's kidney function-CKD 3.  She also was seen in emergency room in June of this year after falling from a ladder with laboratory studies revealing anemia with hemoglobin 8.5, hematocrit 30.5 and MCV that  63.3 with MCH of 17.6 and an MCHC of 27.9, platelet count of 333,000 with normal differential.  In reviewing the patient's blood counts he is definitely developed microcytosis and hypochromia since June and, as such, when immediately as concerned about iron deficiency as an underlying issue.  He also has chronic leukocytosis that appears to be unchanged from previous.  We discussed IV iron as a treatment to address the immediate issue and thereafter try to determine the source of blood loss if indeed iron deficiency is documented.     The patient went on to be treated with both IV Feraheme ×2 on December 23 and December 30 as well as be given injections of B12 both visits.  As he seen back today January 30 he is feeling some better and his wife has also noticed a difference.  We plan to continue the B12 loading over the next several weeks as well as assessing him for pernicious anemia laboratories today.  The patient is now seen back March 08, 2017.  While he's had some improvement in his energy level he still has pain in his feet which has progressed associated with a mildly raised, violaceous group of lesions involving his calf and left foot have developed.  He indicates he is to see a podiatrist in the near future and with the findings of pernicious anemia, ongoing iron deficiency, and unexplained rash developing in the setting of a history of non-small cell lung cancer I requested a PET/CT-?  Paraneoplasia or even new malignant process.    The patient initially was to undergo a PET/CT but this was declined.  A CT of chest and pelvis was obtained  "with no evidence of metastatic disease identified.  The patient hematologically went on to further improve but has, unfortunately, considerable pain in his lower extremities associated with a rash that has a partially vasculitic appearance.  The areas in question are erythematous, serpiginous, mildly raised, and very tender to palpation.  He's been seen by podiatry and treated topically without effect.    Past Medical History:   Diagnosis Date   • Arthralgia    • Arthritis    • Back pain    • Chronic mixed headache syndrome    • Deep venous thrombosis of right popliteal vein     of right knee hemarthrosis   • Diabetes mellitus    • Diverticulosis    • DJD (degenerative joint disease)    • Factor V Leiden    • GERD (gastroesophageal reflux disease)    • Hypercholesterolemia    • Hypertension    • Intracerebral hemorrhage    • Lumbar spinal stenosis    • Muscular aches    • Neck pain    • Non-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   • Thrombosis     Lower extremity       ONCOLOGIC HISTORY:  (History from previous dates can be found in the separate document.)    Current Outpatient Prescriptions on File Prior to Visit   Medication Sig Dispense Refill   • Calcium Carbonate-Vitamin D (CALCIUM-D) 600-400 MG-UNIT tablet Take 1 tablet by mouth daily.     • clotrimazole-betamethasone (LOTRISONE) 1-0.05 % cream      • donepezil (ARICEPT) 5 MG tablet      • HYDROcodone-acetaminophen (NORCO) 5-325 MG per tablet Take 1 tablet by mouth every 6 (six) hours as needed.     • insulin detemir (LEVEMIR) 100 UNIT/ML injection Inject 20 Units under the skin every night.     • insulin glargine (LANTUS) 100 UNIT/ML injection Inject 12 Units under the skin.     • Insulin Syringe-Needle U-100 (RELION INSULIN SYR 0.5ML/31G) 31G X 5/16\" 0.5 ML misc      • " "irbesartan-hydrochlorothiazide (AVALIDE) 300-12.5 MG tablet Take 1 tablet by mouth daily.     • levETIRAcetam (KEPPRA) 500 MG tablet Take  by mouth 2 (two) times a day.     • NOVOLOG FLEXPEN 100 UNIT/ML solution pen-injector sc pen 3 (three) times a day with meals. Sliding scale     • oxybutynin (DITROPAN) 5 MG tablet Take  by mouth daily.     • pravastatin (PRAVACHOL) 80 MG tablet Take  by mouth daily.     • rivaroxaban (XARELTO) tablet Take 20 mg by mouth daily with dinner.     • vitamin B-12 (CYANOCOBALAMIN) 1000 MCG tablet Take 1,000 mcg by mouth.     • vitamin B-6 (PYRIDOXINE) 100 MG tablet Take 100 mg by mouth.       No current facility-administered medications on file prior to visit.        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     Social History Main Topics   • Smoking status: Former Smoker     Packs/day: 1.00     Years: 20.00     Quit date: 2/8/1996   • Smokeless tobacco: Not on file   • Alcohol use No   • Drug use: No   • Sexual activity: Not on file     Other Topics Concern   • Not on file     Social History Narrative         Cancer-related family history includes Colon cancer in his mother; Lung cancer (age of onset: 77) in his brother.     Review of Systems  A comprehensive 14 point review of systems was performed and was negative except as mentioned.    Objective      Vitals:    04/03/17 1236   BP: 144/76   Pulse: 76   Resp: 18   Temp: 97.6 °F (36.4 °C)   SpO2: 96%   Weight: 165 lb 9.6 oz (75.1 kg)   Height: 69\" (175.3 cm)   PainSc: 8  Comment: pain in legs     Current Status 4/3/2017   ECOG score 1       Physical Exam    GENERAL: Well-developed, well-nourished male in no acute distress.   SKIN: Warm, " dry, left lower calf with erythematous, mildly violaceous nonraised nonpruritic lesions, similar though smaller lesions involving right foot/metatarsal location.  No ulcerations  HEAD: Normocephalic.   EYES: Pupils equal, round and reactive to light. EOMs intact. Conjunctivae normal.   EARS: Hearing intact.   NOSE: Septum midline. No excoriations or nasal discharge.   MOUTH: Tongue is well papillated; no stomatitis or ulcers. Lips normal.   THROAT: Oropharynx without lesions or exudates.   NECK: Supple with good range of motion; no thyromegaly or masses, no JVD or bruits.   LYMPHATICS: No cervical, supraclavicular, axillary or inguinal adenopathy.   CHEST: Decreased breath sounds bilateral bases.   CARDIAC: Status post right video assisted thoracoscopy with healed incision sites.   ABDOMEN: Soft, nontender with no organomegaly or masses.   EXTREMITIES: Without palpable cords or positive Lucius's signs No clubbing, cyanosis or edema.   NEURO: No focal neurological deficits.         RECENT LABS:  Hematology WBC   Date Value Ref Range Status   04/03/2017 16.45 (H) 4.00 - 10.00 10*3/mm3 Final     RBC   Date Value Ref Range Status   04/03/2017 6.18 (H) 4.30 - 5.50 10*6/mm3 Final     Comment:     Possible RBC fragments     Hemoglobin   Date Value Ref Range Status   04/03/2017 15.9 13.5 - 16.5 g/dL Final     Hematocrit   Date Value Ref Range Status   04/03/2017 49.6 (H) 40.0 - 49.0 % Final     Platelets   Date Value Ref Range Status   04/03/2017 349 150 - 375 10*3/mm3 Final      CT CHEST, ABDOMEN AND PELVIS WITHOUT CONTRAST        IMPRESSION:  No evidence for metastatic disease is identified at the  chest, abdomen or pelvis.          Assessment/Plan     76-year-old male with previous history of factor V Leiden gene mutation discovered during a previous hospitalization. The patient had a CVA as well as a lower extremity thrombosis. He has been on Coumadin following this without complication. Unfortunately later developed  right popliteal venous thrombosis of his right knee hemarthrosis and was found on assessment to have a suspicious right upper lobe pulmonary nodule. After review in part by CT and PET as well as Thoracic and Vascular consulta t ion, he was felt a candidate for resection of what is felt almost certainly to be a malignancy. We therefore proceeded to be video assisted thoracoscopy 08/15/2014 with wedge resection of right upper lobe, 08/15/2014. Final pathology again has revealed a stage IA non-small cell lung carcinoma-adenocarcinoma, which is EGFR mutated as described. Please note the patient had IVC filter placed prior to surgery as well.  The patient thereafter was asked to continue on Xarelto indefinitely as well as have his IVC filter removed.  This was the case and he is thereafter been seen by both Dr. Kenney and Dr. Meyer on a regular basis.    The patient now presents with evidence of worsening anemia-hypochromic, microcytic.  We plan to assess him with several laboratory studies which are now becoming available.  At the time of this dictation the patient's iron is 18, TIBC is 452 with an iron saturation of 4 and a ferritin of 6.5.  The patient therefore considerably iron deficient.  We proceed to give IV Feraheme both December 23 and December 30 as well as B12 IM injections.  We find that he still is some degree of iron deficiency when reviewed January 30 and may need follow-up endoscopy.  The patient states that this has been done within the last year?.      The patient is now next seen March 08, 2017.  Though he feels somewhat improved per his anemia is having worsening foot pain and unexplained lesions involving his feet.  He also clearly has pernicious anemia (note antiparietal cell antibodies) and ongoing iron deficiency (still at 9% saturation) despite iron infusions.  After discussion and with concern for possible paraneoplasia additional scans were performed including CT of chest and pelvis (rather than  PET/CT) that were negative.  As the patient's next seen April 3 hematologically he is responded still has lower extremity lesions ?livedoid vasculopathy.  Plan to ask for dermatologic assessment and likely biopsy through the patient's dermatologist.  We will continue B12 and regular basis monthly and see him back in 2 months for follow-up

## 2017-07-03 PROBLEM — I81 PORTAL VEIN THROMBOSIS: Status: ACTIVE | Noted: 2017-01-01

## 2017-07-03 NOTE — CONSULTS
CONSULT NOTE    Patient Identification:  Alfredo Gonzalez  77 y.o.  male  1940  [id number]            Requesting physician:  Dr. Gigi Gillespie    Reason for Consultation:  Intensivist care, dyspnea    CC: abd pain, soa     History of Present Illness:  Patient is a 76yo with pmh below who presented to Dr. Gillespie's office with soa over the past 2 weeks and abd pain starting over the past 2-3 days that was worsening.  He was brought to the ED where investigation revealed a protal vein thrombosis and splenic infarcts.  He was started on heparin gtt by Dr. Gillespie and admitted to the CCU for close observation.    His dyspnea has been going on for the past two weeks but is very intermittent and happens occassionally when he is sitting at night watching TV.  He denies associated chest pain or tightness, specifically pleuritic chest pain.  No hemoptysis.  No cough.  No sputum production.  No fevers.    He has not missed a Xarelto dose.    Review of Systems:  CONSTITUTIONAL:  Denies fevers or chills  EYE:  No new vision changes  EAR:  No change in hearing  CARDIAC:  No chest pain  PULMONARY:  No productive cough +shortness of breath  GI:  No diarrhea, hematemesis or hematochezia, +abd pain - clear Bms  RENAL:  No dysuria or urinary frequency  MUSCULOSKELETAL:  No musculoskeletal complaints  ENDOCRINE:  No heat or cold intolerance  INTEGUMENTARY: No skin rashes  NEUROLOGICAL:  No dizziness or confusion.  No seizure activity  PSYCHIATRIC:  No new anxiety or depression  12 system review of systems performed and all else negative    Past Medical History:   Diagnosis Date   • Arthralgia    • Arthritis    • Back pain    • Chronic mixed headache syndrome    • Deep venous thrombosis of right popliteal vein     of right knee hemarthrosis   • Diabetes mellitus    • Diverticulosis    • DJD (degenerative joint disease)    • Factor V Leiden    • GERD (gastroesophageal reflux disease)    • Hypercholesterolemia    • Hypertension   "  • Intracerebral hemorrhage    • Lumbar spinal stenosis    • Muscular aches    • Neck pain    • Non-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   • Thrombosis     Lower extremity       Past Surgical History:   Procedure Laterality Date   • BRAIN SURGERY     • CHOLECYSTECTOMY     • HERNIA REPAIR      Bilateral inguinal hernia and umbilical hernia repair   • LUNG REMOVAL, PARTIAL  08/2014   • PROSTATE SURGERY     • VENA CAVA FILTER PLACEMENT          Prescriptions Prior to Admission   Medication Sig Dispense Refill Last Dose   • Calcium Carbonate-Vitamin D (CALCIUM-D) 600-400 MG-UNIT tablet Take 1 tablet by mouth daily.   Taking   • carvedilol (COREG) 12.5 MG tablet    Taking   • cilostazol (PLETAL) 50 MG tablet    Taking   • clotrimazole-betamethasone (LOTRISONE) 1-0.05 % cream    Taking   • HYDROcodone-acetaminophen (NORCO) 5-325 MG per tablet Take 1 tablet by mouth every 6 (six) hours as needed.   Taking   • Insulin Syringe-Needle U-100 (RELION INSULIN SYR 0.5ML/31G) 31G X 5/16\" 0.5 ML misc    Taking   • irbesartan-hydrochlorothiazide (AVALIDE) 300-12.5 MG tablet Take 1 tablet by mouth daily.   Taking   • pravastatin (PRAVACHOL) 80 MG tablet Take  by mouth daily.   Taking   • rivaroxaban (XARELTO) tablet Take 20 mg by mouth daily with dinner.   Taking   • vitamin B-6 (PYRIDOXINE) 100 MG tablet Take 100 mg by mouth.   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     Social History Main Topics   • Smoking status: Former Smoker     Packs/day: 1.00 " "    Years: 20.00     Quit date: 2/8/1996   • Smokeless tobacco: Not on file   • Alcohol use No   • Drug use: No   • Sexual activity: Not on file     Other Topics Concern   • Not on file     Social History Narrative       Family History   Problem Relation Age of Onset   • Colon cancer Mother    • Stroke Father    • Lung cancer Brother 77   • Factor V Leiden deficiency Brother        Physical Exam:  /77  Pulse 68  Temp 98.9 °F (37.2 °C) (Tympanic)   Resp 20  Ht 69\" (175.3 cm)  Wt 158 lb 12.8 oz (72 kg)  SpO2 95%  BMI 23.45 kg/m2  Body mass index is 23.45 kg/(m^2).   GENERAL:  NAD, Aox3  HEENT:  EOMI, nonicteric sclera, no JVD  PULMONARY:    CTAB, no wheeze, no crackles, no rhonchi, symmetric air entry  CARDIAC:  RRR no MRG, S1 S2  ABD: SNTND BS+  EXT: no c/c/e, pulses symmetric 2+ bilaterally  NEURO:  CNs II-XII intact, no focal deficits  SKIN: no lesions, no rash  PSYCH: appropriate mood  LYMPH: no cervical LAD    LABS:    Results from last 7 days  Lab Units 07/03/17  1114   WBC 10*3/mm3 12.17*   HEMOGLOBIN g/dL 13.5*   PLATELETS 10*3/mm3 170     Results from last 7 days  Lab Units 07/03/17  1114   SODIUM mmol/L 139   POTASSIUM mmol/L 4.2   CHLORIDE mmol/L 99   CO2 mmol/L 25.5   BUN mg/dL 42*   CREATININE mg/dL 1.84*   GLUCOSE mg/dL 195*   CALCIUM mg/dL 9.4   Estimated Creatinine Clearance: 33.6 mL/min (by C-G formula based on Cr of 1.84).    Imaging: I personally visualized the images of scans/x-rays performed within last 3 days.  Imaging Results (most recent)     Procedure Component Value Units Date/Time    CT Abdomen Pelvis Without Contrast [[id number]] Collected:  07/03/17 1256     Updated:  07/03/17 1629    Narrative:       CT ABDOMEN AND PELVIS WITHOUT IV CONTRAST     HISTORY: 77-year-old male with abdominal pain and diarrhea.     TECHNIQUE: 3 mm images were obtained through the abdomen and pelvis  without the administration of IV contrast. Compared with noncontrasted  CT of the abdomen and pelvis " from 03/29/2017.     FINDINGS: There has been significant interval increase in splenic size  currently measuring approximately 16.5 cm in maximal diameter,  previously approximately 12 cm. There is a new small volume of ascites  within the abdomen and pelvis. The main portal vein appears distended  and is heterogeneous. The right portal vein and left portal vein are  distended as well and there is some periportal edema. There is also a  thickened appearance of the common bile duct. No definite intrahepatic  biliary dilatation is seen. Noncontrasted pancreas, adrenals, and  kidneys appear unremarkable other than a single tiny nonobstructing left  renal stone. No acute bowel abnormality is seen. Appendix appears within  normal limits. Multiple brachytherapy seeds are noted within the  prostate. There are moderately extensive abdominal aortic  atherosclerotic changes without aneurysmal dilatation. There are no  pleural effusions.       Impression:       Findings are suspicious for portal vein thrombosis. There  has also been interval development of splenomegaly and ascites which may  be secondary to portal venous thrombus. Further evaluation with an IV  contrast-enhanced CT of the abdomen and pelvis is recommended.     Immediately discussed with Dr. Siddiqui by phone.     This report was finalized on 7/3/2017 4:26 PM by Dr. Stephanie Adams MD.       CT Abdomen Pelvis With Contrast [[id number]] Collected:  07/03/17 1524     Updated:  07/03/17 1631    Narrative:       CT ABDOMEN AND PELVIS WITH IV CONTRAST     HISTORY: 77-year-old male with abnormal appearance of the portal vein on  a noncontrasted CT of the abdomen and pelvis performed earlier today.     TECHNIQUE: 3 mm images were obtained through the abdomen and pelvis  after the administration of IV contrast. Compared with the noncontrasted  CT abdomen and pelvis performed earlier today.     FINDINGS: There is extensive acute thrombosis of the main portal vein,  right and  left portal veins, and nearly the entire splenic vein. The SMV  is patent. The spleen is enlarged and there are 3 small wedge-shaped  regions of decreased enhancement. There is a heterogeneous enhancement  pattern throughout the central aspect of the liver. There is a tiny  amount of perihepatic and perisplenic ascites and there is a small  amount of free fluid within the dependent aspect of the pelvis. No acute  abnormality is seen involving the pancreas, adrenals, or kidneys. Single  nonobstructing left renal stone is noted. No acute bowel abnormality is  seen. Prostatic brachytherapy seeds again noted.       Impression:       1. Extensive acute thrombosis of the portal vein, right and left  intrahepatic portal veins, and there is extensive acute thrombosis of  nearly the entire splenic vein. The SMV is patent.  2. Development of splenomegaly and there are 3 small evolving splenic  infarctions. Very small volume of free fluid within the abdomen and  pelvis.     Discussed with Dr. Siddiqui.     This report was finalized on 7/3/2017 4:27 PM by Dr. Stephanie Adams MD.             Assessment / Recommendations:  Acute Portal Vein Thrombosis  Factor V Leiden and thrombophilia  H/O Stage 1A adca Lung  HTN  HLD  ICH in 2002  Sinus Thrombosis H/O 2002  DMII  Lumbar spinal Stenosis  Seizure Disorder  AC at home with Xarleto    -cxr and vq scan ordered by DR. LEMOS,  Will follow these studies.  If PE present ac on board but depending on size, interventions could be considered.  -vascular surgery consulted for protal vein thrombosis  -serial labs.    Thanks for allowing us to participate in the care of this patient.  LPC is always available to discuss any concerns, questions or to help coordinate the patient's care.      Landry Ho MD  [facility]  07/03/17  6:14 PM

## 2017-07-03 NOTE — ED PROVIDER NOTES
EMERGENCY DEPARTMENT ENCOUNTER    CHIEF COMPLAINT  Chief Complaint: abdominal pain  History given by: patient  History limited by: none  Room Number: 32/32  PMD: Gigi Gillespie Jr., MD      HPI:  Pt is a 77 y.o. male who presents complaining of abdominal pain with frequent diarrhea for the past four to five days. The pain is dull, mild, and generalized.  He also complains of some shortness of breath because of the pain and chills. No cough, recent measured fever, urinary difficulties, or other complaints. Hx of prostate cancer in remission. Hx of CVA in the past, anticoagulated with Xarelto.    Duration:  4-5 days  Onset: gradual  Timing: constant  Location: generalized  Quality: dull  Intensity/Severity: mild  Progression: unchanged  Associated Symptoms: diarrhea, weight change, shortness of breath, chills  Aggravating Factors: none stated  Alleviating Factors: none stated  Previous Episodes: none stated  Treatment before arrival: none stated    PAST MEDICAL HISTORY  Active Ambulatory Problems     Diagnosis Date Noted   • Non-small cell lung cancer 02/08/2016   • Chest pain 02/08/2016   • Chronic kidney disease (CKD) 08/26/2016   • Microcytic hypochromic anemia 12/21/2016   • History of prostate cancer 12/21/2016   • Anemia, B12 deficiency 01/30/2017   • Iron deficiency anemia 03/03/2017   • Pernicious anemia 04/03/2017     Resolved Ambulatory Problems     Diagnosis Date Noted   • No Resolved Ambulatory Problems     Past Medical History:   Diagnosis Date   • Arthralgia    • Arthritis    • Back pain    • Chronic mixed headache syndrome    • Deep venous thrombosis of right popliteal vein    • Diabetes mellitus    • Diverticulosis    • DJD (degenerative joint disease)    • Factor V Leiden    • GERD (gastroesophageal reflux disease)    • Hypercholesterolemia    • Hypertension    • Intracerebral hemorrhage    • Lumbar spinal stenosis    • Muscular aches    • Neck pain    • Non-small cell carcinoma of lung    •  Obesity    • Peripheral neuropathy    • Prostate cancer    • Shortness of breath    • Stroke    • Thrombosis        PAST SURGICAL HISTORY  Past Surgical History:   Procedure Laterality Date   • BRAIN SURGERY     • CHOLECYSTECTOMY     • HERNIA REPAIR      Bilateral inguinal hernia and umbilical hernia repair   • LUNG REMOVAL, PARTIAL  08/2014   • PROSTATE SURGERY     • VENA CAVA FILTER PLACEMENT         FAMILY HISTORY  Family History   Problem Relation Age of Onset   • Colon cancer Mother    • Stroke Father    • Lung cancer Brother 77   • Factor V Leiden deficiency Brother        SOCIAL HISTORY  Social History     Social History   • Marital status:      Spouse name: Anya   • Number of children: N/A   • Years of education: High School     Occupational History   •  Retired     Worked as a  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     Social History Main Topics   • Smoking status: Former Smoker     Packs/day: 1.00     Years: 20.00     Quit date: 2/8/1996   • Smokeless tobacco: Not on file   • Alcohol use No   • Drug use: No   • Sexual activity: Not on file     Other Topics Concern   • Not on file     Social History Narrative       ALLERGIES  Review of patient's allergies indicates no known allergies.    REVIEW OF SYSTEMS  Review of Systems   Constitutional: Positive for chills and unexpected weight change. Negative for fever.   HENT: Negative.    Eyes: Negative.    Respiratory: Positive for shortness of breath. Negative for cough.    Cardiovascular: Negative.    Gastrointestinal: Positive for abdominal pain and diarrhea. Negative for blood in stool and vomiting.   Genitourinary: Negative.    Musculoskeletal: Negative.    Skin: Negative.    Neurological: Negative.    All other systems reviewed and are negative.      PHYSICAL EXAM  ED Triage Vitals   Temp Heart Rate Resp BP SpO2   07/03/17 1053 07/03/17 1053 07/03/17 1053 07/03/17 1101 07/03/17 1053    98.9 °F (37.2 °C) 68 20 119/55 96 %      Temp src Heart Rate Source Patient Position BP Location FiO2 (%)   07/03/17 1053 -- -- -- --   Tympanic           Physical Exam   Constitutional: He is oriented to person, place, and time and well-developed, well-nourished, and in no distress.   HENT:   Head: Normocephalic and atraumatic.   Eyes: EOM are normal. Pupils are equal, round, and reactive to light.   Neck: Normal range of motion. Neck supple.   Cardiovascular: Normal rate and normal heart sounds.  An irregularly irregular rhythm present.   Pulmonary/Chest: Effort normal and breath sounds normal. No respiratory distress.   Abdominal: Soft. There is no tenderness. There is no rebound and no guarding.   Musculoskeletal: Normal range of motion. He exhibits no edema.   Neurological: He is alert and oriented to person, place, and time. He has normal sensation and normal strength.   Skin: Skin is warm and dry.   Psychiatric: Mood and affect normal.   Nursing note and vitals reviewed.      LAB RESULTS  Lab Results (last 24 hours)     Procedure Component Value Units Date/Time    CBC & Differential [[id number]] Collected:  07/03/17 1114    Specimen:  Blood Updated:  07/03/17 1135    Narrative:       The following orders were created for panel order CBC & Differential.  Procedure                               Abnormality         Status                     ---------                               -----------         ------                     Scan Slide[[id number]]                                                                   CBC Auto Differential[[id number]]         Abnormal            Final result                 Please view results for these tests on the individual orders.    Comprehensive Metabolic Panel [[id number]]  (Abnormal) Collected:  07/03/17 1114    Specimen:  Blood Updated:  07/03/17 1148     Glucose 195 (H) mg/dL      BUN 42 (H) mg/dL      Creatinine 1.84 (H) mg/dL      Sodium 139 mmol/L      Potassium 4.2 mmol/L       Chloride 99 mmol/L      CO2 25.5 mmol/L      Calcium 9.4 mg/dL      Total Protein 6.5 g/dL      Albumin 3.00 (L) g/dL      ALT (SGPT) 60 (H) U/L      AST (SGOT) 67 (H) U/L      Alkaline Phosphatase 201 (H) U/L      Total Bilirubin 0.9 mg/dL      eGFR Non African Amer 36 (L) mL/min/1.73      Globulin 3.5 gm/dL      A/G Ratio 0.9 g/dL      BUN/Creatinine Ratio 22.8     Anion Gap 14.5 mmol/L     Narrative:       The MDRD GFR formula is only valid for adults with stable renal function between ages 18 and 70.    Lipase [[id number]]  (Abnormal) Collected:  07/03/17 1114    Specimen:  Blood Updated:  07/03/17 1148     Lipase 10 (L) U/L     CBC Auto Differential [[id number]]  (Abnormal) Collected:  07/03/17 1114    Specimen:  Blood Updated:  07/03/17 1135     WBC 12.17 (H) 10*3/mm3      RBC 5.06 10*6/mm3      Hemoglobin 13.5 (L) g/dL      Hematocrit 41.1 %      MCV 81.2 fL      MCH 26.7 (L) pg      MCHC 32.8 g/dL      RDW 15.6 (H) %      RDW-SD 45.9 fl      Platelets 170 10*3/mm3      Neutrophil % 80.5 (H) %      Lymphocyte % 5.4 (L) %      Monocyte % 11.1 %      Eosinophil % 2.0 %      Basophil % 0.4 %      Immature Grans % 0.6 (H) %      Neutrophils, Absolute 9.80 (H) 10*3/mm3      Lymphocytes, Absolute 0.66 (L) 10*3/mm3      Monocytes, Absolute 1.35 (H) 10*3/mm3      Eosinophils, Absolute 0.24 10*3/mm3      Basophils, Absolute 0.05 10*3/mm3      Immature Grans, Absolute 0.07 (H) 10*3/mm3      nRBC 0.0 /100 WBC     Urinalysis With / Culture If Indicated [[id number]]  (Abnormal) Collected:  07/03/17 1316    Specimen:  Urine from Urine, Clean Catch Updated:  07/03/17 1334     Color, UA Dark Yellow (A)     Appearance, UA Cloudy (A)     pH, UA 5.5     Specific Gravity, UA 1.023     Glucose, UA Negative     Ketones, UA Trace (A)     Bilirubin, UA Negative     Blood, UA Negative     Protein, UA 30 mg/dL (1+) (A)     Leuk Esterase, UA Negative     Nitrite, UA Negative     Urobilinogen, UA 1.0 E.U./dL    Urinalysis, Microscopic  Only [[id number]]  (Abnormal) Collected:  07/03/17 1316    Specimen:  Urine from Urine, Clean Catch Updated:  07/03/17 1335     RBC, UA 3-5 (A) /HPF      WBC, UA 0-2 /HPF      Bacteria, UA None Seen /HPF      Squamous Epithelial Cells, UA 0-2 /HPF      Hyaline Casts, UA 3-6 /LPF      Methodology Automated Microscopy        I ordered the above labs and reviewed the results      RADIOLOGY  CT Abdomen Pelvis With Contrast   Preliminary Result   1. Extensive acute thrombosis of the portal vein, right and left   intrahepatic portal vein branches, and nearly the entire splenic vein.   The SMV is patent.   2. Splenomegaly and 3 small evolving splenic infarctions. Very small   volume of free fluid within the abdomen and pelvis.       Discussed with Dr. Siddiqui.          CT Abdomen Pelvis Without Contrast   Preliminary Result   Findings are suspicious for portal vein thrombosis. There has also been   interval development of splenomegaly and ascites which may be secondary   to portal venous thrombus. Further evaluation with contrast-enhanced CT   of the abdomen and pelvis is recommended.       Immediately discussed with Dr. Siddiqui by phone.             I ordered the above noted radiological studies. Interpreted by radiologist. Discussed with radiologist (Dr. Adams). Reviewed by me in PACS.       PROCEDURES  Critical Care  Performed by: KARINA SIDDIQUI  Authorized by: KARINA SIDDIQUI   Total critical care time: 35 minutes  Critical care time was exclusive of separately billable procedures and treating other patients.  Critical care was necessary to treat or prevent imminent or life-threatening deterioration of the following conditions: circulatory failure.  Critical care was time spent personally by me on the following activities: development of treatment plan with patient or surrogate, discussions with consultants, discussions with primary provider, evaluation of patient's response to treatment, examination of patient,  obtaining history from patient or surrogate, ordering and performing treatments and interventions, ordering and review of laboratory studies, ordering and review of radiographic studies, pulse oximetry and re-evaluation of patient's condition.      EKG         EKG time: 12:08 PM  Rhythm/Rate: Sinus, rate 63  P waves and OK: Normal  QRS, axis: LAD   ST and T waves: unremarkable   Interpreted Contemporaneously by me, independently viewed  Unchanged compared to prior in 2012      PROGRESS AND CONSULTS  ED Course   Comment By Time   12:01 PM  76 yo with 1 week of abd pain and diarrhea.  On exam abd is benign.  Labs show leukocytosis 12.2, elevated Cr of 1.8 (baseline around 1.5).  Will CT abd w/o contrast for further evaluation.  Will give IVF for dehydration. Sher Siddiqui MD 07/03 1203     11:06 AM: WBC is elevated, plan to order CT abd/pelvis for further evaluation, r/o diverticulitis. Irregular rhythm ausculated, will order EKG to r/o any new onset a-fib.    12:39 PM: Patient rechecked, discussed CT results which show a possible venous clot in the IVC. Contrasted CT was contraindicated initially by renal function. Discussed plan to consult Dr. Gillespie, likely admission for further evaluation and contrast CT. Pt understands and agrees with the plan. All questions answered.    12:52 PM: Call w/ Dr. Gillespie, he would like for the patient to have the contrasted CT, will contact with results. Will give fluids and bicarb prior to the study.    3:20 PM: Dr. Adams showed CT results which do indicate a large venous thrombus. Discussed with Dr. Gillespie who is in the ED evaluating the patient, he plans to start on Heparin and admit to ICU.      MEDICAL DECISION MAKING  Results were reviewed/discussed with the patient and they were also made aware of online access. Pt also made aware that some labs, such as cultures, will not be resulted during ER visit and follow up with PMD is necessary.     MDM  Number of Diagnoses or Management  Options     Amount and/or Complexity of Data Reviewed  Clinical lab tests: reviewed and ordered (WBC 12.17, Hgb 13.5, Lipase 10, GFR 36, BUN 42, Creatinine 1.84)  Tests in the radiology section of CPT®: ordered and reviewed (CT abd/pelvis shows a possible IVC thrombus, limited exam as it is noncontrasted. Contrasted CT shows acute thrombosis of the hepatic portal vein and splenic vein)  Tests in the medicine section of CPT®: ordered and reviewed  Discussion of test results with the performing providers: yes (Dr. Adams)  Discuss the patient with other providers: yes (Dr. Gillespie)  Independent visualization of images, tracings, or specimens: yes    Patient Progress  Patient progress: stable         DIAGNOSIS  Final diagnoses:   Acute venous thrombosis   Generalized abdominal pain       DISPOSITION  ADMISSION: Patient admitted by Dr. Gillespie to the ICU.    Discussed treatment plan and reason for admission with pt/family and admitting physician.  Pt/family voiced understanding of the plan for admission for further testing/treatment as needed.     Latest Documented Vital Signs:  As of 3:46 PM  BP- 121/57 HR- 68 Temp- 98.9 °F (37.2 °C) (Tympanic) O2 sat- 95%    --  Documentation assistance provided by casimiro Siddiqui MD for Dr. Siddiqui.  Information recorded by the casimiro was done at my direction and has been verified and validated by me.     Armand Lloyd  07/03/17 [id number]       Sher Siddiqui MD  07/03/17 [id number]

## 2017-07-03 NOTE — CONSULTS
Patient Name: Alfredo Gonzalez Account #: [id number]    MRN: [id number] Admission Date: 7/3/2017    History and Physical Exam  Service: Vascular Surgery Date of Evaluation: July 3, 2017        CHIEF COMPLAINT: Abdominal pain   HPI: Alfredo Gonzalez is a 77 y.o. male who I know from an IVC filter placement and removal a few months ago.  He has factor V Leiden.  He is on long-term anticoagulation with Xarelto.  He presented to the emergency department today complaining of abdominal pain, shortness of breath for the last 2 weeks but acutely worse in the last few days.  He scan revealed portal vein thrombosis with splenic infarcts, splenomegaly.  The patient denies having a missed any Xarelto doses.  His abdominal pain feels better than when he was initially admitted.  He is anticoagulated on a heparin drip.  We are asked to evaluate him for recommendations regarding the portal vein thrombosis.    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   • Thrombosis     Lower extremity      PAST SURGICAL HISTORY:   Past Surgical History:   Procedure Laterality Date   • BRAIN SURGERY     • CHOLECYSTECTOMY     • HERNIA REPAIR      Bilateral inguinal hernia and umbilical hernia repair   • LUNG REMOVAL, PARTIAL  08/2014   • PROSTATE SURGERY     •  "VENA CAVA FILTER PLACEMENT        FAMILY HISTORY:   Family History   Problem Relation Age of Onset   • Colon cancer Mother    • Stroke Father    • Lung cancer Brother 77   • Factor V Leiden deficiency Brother       SOCIAL HISTORY:   Social History   Substance Use Topics   • Smoking status: Former Smoker     Packs/day: 1.00     Years: 20.00     Quit date: 2/8/1996   • Smokeless tobacco: None   • Alcohol use No      MEDICATIONS:   No current facility-administered medications on file prior to encounter.      Current Outpatient Prescriptions on File Prior to Encounter   Medication Sig Dispense Refill   • Calcium Carbonate-Vitamin D (CALCIUM-D) 600-400 MG-UNIT tablet Take 1 tablet by mouth daily.     • carvedilol (COREG) 12.5 MG tablet      • cilostazol (PLETAL) 50 MG tablet      • clotrimazole-betamethasone (LOTRISONE) 1-0.05 % cream      • HYDROcodone-acetaminophen (NORCO) 5-325 MG per tablet Take 1 tablet by mouth every 6 (six) hours as needed.     • Insulin Syringe-Needle U-100 (RELION INSULIN SYR 0.5ML/31G) 31G X 5/16\" 0.5 ML misc      • irbesartan-hydrochlorothiazide (AVALIDE) 300-12.5 MG tablet Take 1 tablet by mouth daily.     • pravastatin (PRAVACHOL) 80 MG tablet Take  by mouth daily.     • rivaroxaban (XARELTO) tablet Take 20 mg by mouth daily with dinner.     • vitamin B-6 (PYRIDOXINE) 100 MG tablet Take 100 mg by mouth.     • [DISCONTINUED] donepezil (ARICEPT) 5 MG tablet      • [DISCONTINUED] insulin detemir (LEVEMIR) 100 UNIT/ML injection Inject 20 Units under the skin every night.     • [DISCONTINUED] insulin glargine (LANTUS) 100 UNIT/ML injection Inject 12 Units under the skin.     • [DISCONTINUED] levETIRAcetam (KEPPRA) 500 MG tablet Take  by mouth 2 (two) times a day.     • [DISCONTINUED] NOVOLOG FLEXPEN 100 UNIT/ML solution pen-injector sc pen 3 (three) times a day with meals. Sliding scale     • [DISCONTINUED] oxybutynin (DITROPAN) 5 MG tablet Take  by mouth daily.     • [DISCONTINUED] vitamin B-12 " "(CYANOCOBALAMIN) 1000 MCG tablet Take 1,000 mcg by mouth.               ALLERGIES: Review of patient's allergies indicates no known allergies.     COMPLETE REVIEW OF SYSTEMS:     Review of Systems   Constitutional: Negative for activity change, chills, fatigue and fever.   HENT: Negative for ear pain, sneezing and sore throat.    Respiratory: Positive for shortness of breath. Negative for chest tightness.    Cardiovascular: Negative for chest pain and leg swelling.   Gastrointestinal: Positive for abdominal pain. Negative for blood in stool, diarrhea, nausea and vomiting.   Genitourinary: Negative for flank pain and hematuria.   Musculoskeletal: Negative for joint swelling and myalgias.   Skin: Negative for color change, pallor and wound.   Neurological: Negative for dizziness and seizures.   Hematological: Negative for adenopathy. Does not bruise/bleed easily.   Psychiatric/Behavioral: Negative for confusion and hallucinations.         PHYSICAL EXAM:   Patient Vitals for the past 24 hrs:   BP Temp Temp src Pulse Resp SpO2 Height Weight   07/03/17 1900 - 98.3 °F (36.8 °C) Oral 68 18 93 % 69\" (175.3 cm) 158 lb 12.8 oz (72 kg)   07/03/17 1834 152/78 - - 77 - (!) 83 % - -   07/03/17 1803 141/80 - - 76 - 94 % - -   07/03/17 1802 135/88 - - 72 - 95 % - -   07/03/17 1747 - - - - - - - 158 lb 12.8 oz (72 kg)   07/03/17 1729 149/75 - - - - 96 % - -   07/03/17 1633 143/77 - - - - 95 % - -   07/03/17 1602 136/70 - - - - 95 % - -   07/03/17 1533 144/69 - - - - 95 % - -   07/03/17 1503 127/60 - - - - 95 % - -   07/03/17 1433 121/57 - - - - 95 % - -   07/03/17 1402 127/61 - - - - 94 % - -   07/03/17 1333 121/57 - - - - 94 % - -   07/03/17 1303 124/58 - - - - 95 % - -   07/03/17 1233 109/73 - - - - 93 % - -   07/03/17 1202 106/61 - - - - 93 % - -   07/03/17 1133 111/55 - - - - 93 % - -   07/03/17 1110 105/60 - - - - 95 % - -   07/03/17 1102 119/55 - - - - - - -   07/03/17 1101 119/55 - - - - - - -   07/03/17 1053 - 98.9 °F (37.2 " "°C) Tympanic 68 20 96 % 69\" (175.3 cm) 162 lb (73.5 kg)        Physical Exam   Constitutional: He is oriented to person, place, and time. He appears well-developed. No distress.   HENT:   Head: Normocephalic and atraumatic.   Right Ear: External ear normal.   Left Ear: External ear normal.   Eyes: Conjunctivae are normal. Pupils are equal, round, and reactive to light.   Neck: Normal range of motion. No JVD present. No tracheal deviation present.   Cardiovascular: Normal rate, regular rhythm, S1 normal and S2 normal.    Pulses:       Carotid pulses are 2+ on the right side, and 2+ on the left side.       Radial pulses are 2+ on the right side, and 2+ on the left side.        Femoral pulses are 2+ on the right side, and 2+ on the left side.       Popliteal pulses are 2+ on the right side, and 2+ on the left side.        Dorsalis pedis pulses are 2+ on the right side, and 2+ on the left side.        Posterior tibial pulses are 2+ on the right side, and 2+ on the left side.   Pulmonary/Chest: Effort normal. No respiratory distress. He exhibits no tenderness.   Abdominal: Soft. He exhibits no distension. There is tenderness.   Mild tenderness, no peritonitis   Musculoskeletal: Normal range of motion.   Neurological: He is alert and oriented to person, place, and time. No cranial nerve deficit.   Skin: Skin is warm and dry. He is not diaphoretic.   Psychiatric: He has a normal mood and affect. His behavior is normal.             LABS:      Recent Results (from the past 24 hour(s))   Comprehensive Metabolic Panel    Collection Time: 07/03/17 11:14 AM   Result Value Ref Range    Glucose 195 (H) 65 - 99 mg/dL    BUN 42 (H) 8 - 23 mg/dL    Creatinine 1.84 (H) 0.76 - 1.27 mg/dL    Sodium 139 136 - 145 mmol/L    Potassium 4.2 3.5 - 5.2 mmol/L    Chloride 99 98 - 107 mmol/L    CO2 25.5 22.0 - 29.0 mmol/L    Calcium 9.4 8.6 - 10.5 mg/dL    Total Protein 6.5 6.0 - 8.5 g/dL    Albumin 3.00 (L) 3.50 - 5.20 g/dL    ALT (SGPT) 60 (H) " 1 - 41 U/L    AST (SGOT) 67 (H) 1 - 40 U/L    Alkaline Phosphatase 201 (H) 39 - 117 U/L    Total Bilirubin 0.9 0.1 - 1.2 mg/dL    eGFR Non African Amer 36 (L) >60 mL/min/1.73    Globulin 3.5 gm/dL    A/G Ratio 0.9 g/dL    BUN/Creatinine Ratio 22.8 7.0 - 25.0    Anion Gap 14.5 mmol/L   Lipase    Collection Time: 07/03/17 11:14 AM   Result Value Ref Range    Lipase 10 (L) 13 - 60 U/L   Light Blue Top    Collection Time: 07/03/17 11:14 AM   Result Value Ref Range    Extra Tube hold for add-on    Green Top (Gel)    Collection Time: 07/03/17 11:14 AM   Result Value Ref Range    Extra Tube Hold for add-ons.    Lavender Top    Collection Time: 07/03/17 11:14 AM   Result Value Ref Range    Extra Tube hold for add-on    Gold Top - SST    Collection Time: 07/03/17 11:14 AM   Result Value Ref Range    Extra Tube Hold for add-ons.    CBC Auto Differential    Collection Time: 07/03/17 11:14 AM   Result Value Ref Range    WBC 12.17 (H) 4.50 - 10.70 10*3/mm3    RBC 5.06 4.60 - 6.00 10*6/mm3    Hemoglobin 13.5 (L) 13.7 - 17.6 g/dL    Hematocrit 41.1 40.4 - 52.2 %    MCV 81.2 79.8 - 96.2 fL    MCH 26.7 (L) 27.0 - 32.7 pg    MCHC 32.8 32.6 - 36.4 g/dL    RDW 15.6 (H) 11.5 - 14.5 %    RDW-SD 45.9 37.0 - 54.0 fl    Platelets 170 140 - 500 10*3/mm3    Neutrophil % 80.5 (H) 42.7 - 76.0 %    Lymphocyte % 5.4 (L) 19.6 - 45.3 %    Monocyte % 11.1 5.0 - 12.0 %    Eosinophil % 2.0 0.3 - 6.2 %    Basophil % 0.4 0.0 - 1.5 %    Immature Grans % 0.6 (H) 0.0 - 0.5 %    Neutrophils, Absolute 9.80 (H) 1.90 - 8.10 10*3/mm3    Lymphocytes, Absolute 0.66 (L) 0.90 - 4.80 10*3/mm3    Monocytes, Absolute 1.35 (H) 0.20 - 1.20 10*3/mm3    Eosinophils, Absolute 0.24 0.00 - 0.70 10*3/mm3    Basophils, Absolute 0.05 0.00 - 0.20 10*3/mm3    Immature Grans, Absolute 0.07 (H) 0.00 - 0.03 10*3/mm3    nRBC 0.0 0.0 - 0.0 /100 WBC   Hemoglobin A1c    Collection Time: 07/03/17 11:14 AM   Result Value Ref Range    Hemoglobin A1C 7.56 (H) 4.80 - 5.60 %   Urinalysis With  / Culture If Indicated    Collection Time: 07/03/17  1:16 PM   Result Value Ref Range    Color, UA Dark Yellow (A) Yellow, Straw    Appearance, UA Cloudy (A) Clear    pH, UA 5.5 5.0 - 8.0    Specific Gravity, UA 1.023 1.005 - 1.030    Glucose, UA Negative Negative    Ketones, UA Trace (A) Negative    Bilirubin, UA Negative Negative    Blood, UA Negative Negative    Protein, UA 30 mg/dL (1+) (A) Negative    Leuk Esterase, UA Negative Negative    Nitrite, UA Negative Negative    Urobilinogen, UA 1.0 E.U./dL 0.2 - 1.0 E.U./dL   Urinalysis, Microscopic Only    Collection Time: 07/03/17  1:16 PM   Result Value Ref Range    RBC, UA 3-5 (A) None Seen, 0-2 /HPF    WBC, UA 0-2 None Seen, 0-2 /HPF    Bacteria, UA None Seen None Seen /HPF    Squamous Epithelial Cells, UA 0-2 None Seen, 0-2 /HPF    Hyaline Casts, UA 3-6 None Seen /LPF    Methodology Automated Microscopy    POC Glucose Fingerstick    Collection Time: 07/03/17  5:28 PM   Result Value Ref Range    Glucose 197 (H) 70 - 130 mg/dL   ]    The following radiologic or non-invasive studies have been reviewed by me: CT abdomen and pelvis       ASSESSMENT/PLAN: 77 y.o. male with portal vein thrombosis, history of factor V Leiden, on chronic anticoagulation on Xarelto.  At this time he has essentially no abdominal pain.  He is therapeutic on a heparin drip.  No intervention is required for this thrombus.  I don't think that he can continue long-term on Xarelto now, probably needs to transition to Coumadin.  As he is not having any abdominal pain and his white blood cell count is essentially normal, would allow to have clear liquids tonight and see how he tolerates that.      I discussed the plan with the patient and he is agreeable to the plan of care at this point.       Problem Points:  3:  Patient has a new problem, with no additional work-up planned (max of 1)  Total problem points:3    Data Points:  1:  I have reviewed or order clinical lab test  1:  I have reviewed or  order radiology test (except heart catheterization or echo)  2:  I have personally and independently review of image, tracing, or specimen  Total data points:4 or more    Risk Points:  Moderate: New diagnosis with unknown prognosis    MDM requires 2/3 (Problem points, Data points and Risk)  MDM Prob point Data point Risk   SF 1 1 Minimal   Low 2 2 Low   Mod 3 3 Moderate   High 4 4 High     Code requires 3/3 (MDM, History and Exam)  Code MDM History Exam Time   84007 SF/Low Detailed Detailed 30   36587 Mod Comprehensive Comprehensive 50   30552 High Comprehensive Comprehensive 70     Detailed history:  4 elements HPI or status of 3 chronic problems; 2-9 system ROS  Comprehensive:  4 elements HPI or status of 3 chronic problems;  10 system ROS    Detailed Exam:  12 findings from any organ system  Comprehensive Exam:  2 findings from each of 9 systems.     Pastor Trinh MD   07/03/17

## 2017-07-03 NOTE — ED TRIAGE NOTES
Chief Complaint   Patient presents with   • Abdominal Pain     patient reports abd pain and diarrhea x 1 week   • Diarrhea

## 2017-07-03 NOTE — PLAN OF CARE
Problem: Patient Care Overview (Adult)  Goal: Plan of Care Review  Outcome: Ongoing (interventions implemented as appropriate)    07/03/17 0358   Coping/Psychosocial Response Interventions   Plan Of Care Reviewed With spouse;patient   Patient Care Overview   Progress progress toward functional goals as expected   Outcome Evaluation   Outcome Summary/Follow up Plan Pt admitted to CCU to be watched for Acute thrombosis resent. On Heparin gtt and remains a little HTN on admission to CCU. Will watch overnight and await Vascular consult for plan to move foraward.         Problem: Skin Integrity Impairment, Risk/Actual (Adult)  Goal: Identify Related Risk Factors and Signs and Symptoms  Outcome: Ongoing (interventions implemented as appropriate)  Goal: Skin Integrity/Wound Healing  Outcome: Ongoing (interventions implemented as appropriate)    Problem: Fall Risk (Adult)  Goal: Identify Related Risk Factors and Signs and Symptoms  Outcome: Ongoing (interventions implemented as appropriate)  Goal: Absence of Falls  Outcome: Ongoing (interventions implemented as appropriate)

## 2017-07-04 PROBLEM — I81 PORTAL VEIN THROMBOSIS: Status: ACTIVE | Noted: 2017-01-01

## 2017-07-04 NOTE — PROGRESS NOTES
Name: Alfredo Gonzalez ADMIT: 7/3/2017   : 1940  PCP: Gigi Gillespie Jr., MD    MRN: [id number] LOS: 1 days   AGE/SEX: 77 y.o. male  ROOM: 340/1     Active Hospital Problems (** Indicates Principal Problem)    Diagnosis Date Noted   • Portal vein thrombosis [I81] 2017   • Portal vein thrombosis [I81] 2017      Resolved Hospital Problems    Diagnosis Date Noted Date Resolved   No resolved problems to display.     Subjective     77 y.o. male with portal hypertension  Tolerated liquid diet well yesterday  No complaints of abdominal pain.  Feels better than yesterday.     Review of Systems   Constitutional: Negative for activity change, chills, fatigue and fever.   HENT: Negative for ear pain, sneezing and sore throat.    Respiratory: Negative for chest tightness and shortness of breath.    Cardiovascular: Negative for chest pain and leg swelling.   Gastrointestinal: Negative for abdominal pain, blood in stool, diarrhea, nausea and vomiting.   Genitourinary: Negative for flank pain and hematuria.   Musculoskeletal: Negative for joint swelling and myalgias.   Skin: Negative for color change, pallor and wound.   Neurological: Negative for dizziness and seizures.   Hematological: Negative for adenopathy. Does not bruise/bleed easily.   Psychiatric/Behavioral: Negative for confusion and hallucinations.       Objective     Vital Signs  Temp:  [97.9 °F (36.6 °C)-98.6 °F (37 °C)] 97.9 °F (36.6 °C)  Heart Rate:  [57-81] 60  Resp:  [16-18] 16  BP: (105-152)/(55-90) 117/61    Physical Exam   Constitutional: He is oriented to person, place, and time. He appears well-developed. No distress.   HENT:   Head: Normocephalic and atraumatic.   Cardiovascular: Normal rate and regular rhythm.    Pulmonary/Chest: Effort normal. No respiratory distress.   Abdominal: Soft. There is no tenderness.   Musculoskeletal: Normal range of motion. He exhibits no tenderness.   Neurological: He is alert and oriented to person,  place, and time.   Skin: Skin is warm and dry.   Psychiatric: He has a normal mood and affect. His behavior is normal.     No leg swelling    Results Review:       I reviewed the patient's new clinical results.    Results from last 7 days  Lab Units 07/04/17  0543 07/03/17  1114   WBC 10*3/mm3 9.37 12.17*   HEMOGLOBIN g/dL 13.4* 13.5*   PLATELETS 10*3/mm3 168 170     Results from last 7 days  Lab Units 07/04/17  0543 07/03/17  1114   SODIUM mmol/L 134* 139   POTASSIUM mmol/L 4.8 4.2   CHLORIDE mmol/L 96* 99   CO2 mmol/L 23.7 25.5   BUN mg/dL 31* 42*   CREATININE mg/dL 1.44* 1.84*   GLUCOSE mg/dL 286* 195*   Estimated Creatinine Clearance: 43 mL/min (by C-G formula based on Cr of 1.44).  Results from last 7 days  Lab Units 07/04/17  0543 07/03/17  1114   CALCIUM mg/dL 8.5* 9.4   ALBUMIN g/dL 2.90* 3.00*   MAGNESIUM mg/dL 1.9  --        Assessment/Plan           Active Hospital Problems (** Indicates Principal Problem)    Diagnosis Date Noted   • Portal vein thrombosis [I81] 07/04/2017   • Portal vein thrombosis [I81] 07/03/2017      Resolved Hospital Problems    Diagnosis Date Noted Date Resolved   No resolved problems to display.        Assessment & Plan  77 y.o. male with portal vein thrombosis and underlying factor v leiden.  Concern is for underlying malignancy causing portal thrombosis. Heme/Onc workup in progresss. From my standpoint he needs therapeutic anticoagulation but no intervention for the portal vein.  Ok to have a general diet and be moved to the floor from my standpoint.         Pastor Trinh MD  07/04/17   10:59 AM  Office Number [phone]

## 2017-07-04 NOTE — PROGRESS NOTES
"  Daily Progress Note.   [facility] CORONARY CARE  7/4/2017    Patient:  Name:  Alfredo Gonzalez  MRN:  [id number]  1940  77 y.o.  male         Interval History:  No soa.  No cough  No sputum production.    Physical Exam:  /65  Pulse 64  Temp 97.9 °F (36.6 °C) (Oral)   Resp 16  Ht 69\" (175.3 cm)  Wt 158 lb 12.8 oz (72 kg)  SpO2 95%  BMI 23.45 kg/m2  Body mass index is 23.45 kg/(m^2).    Intake/Output Summary (Last 24 hours) at 07/04/17 1003  Last data filed at 07/04/17 0900   Gross per 24 hour   Intake          1987.76 ml   Output              975 ml   Net          1012.76 ml     GENERAL: NAD, Aox3  HEENT: EOMI, nonicteric sclera, no JVD  PULMONARY: CTAB, no wheeze, no crackles, no rhonchi, symmetric air entry  CARDIAC: RRR no MRG, S1 S2  ABD: SNTND BS+  EXT: no c/c/e, pulses symmetric 2+ bilaterally  NEURO: CNs II-XII intact, no focal deficits  SKIN: no lesions, no rash  PSYCH: appropriate mood  LYMPH: no cervical LAD  Data Review:    Notable Labs:  Ptt58.8  Hg 13.4  plts 168  Glu 286  Cr 1.44  Na 134  Alt 47 ast 57 alp phose 184  liape 12    Imaging:  Ct chest 3/2017:  CHEST: Heart and great vessels are stable. There is advanced calcified  coronary disease. No new mass or adenopathy is identified within the  mediastinum or at the hilar levels. Chest wall is unremarkable. No bone  lesions are apparent. Lung windows demonstrate wedge resection of the  right upper lobe. No new suspicious nodule or mass is present within  either lung. There is no active infiltrate or effusion.      ABDOMEN AND PELVIS: Liver appears unremarkable. There has been  cholecystectomy. There is some mild associated biliary ectasia. Spleen  is stable in size and configuration and contains a number of granuloma.  The pancreas, adrenal glands, and kidneys demonstrate an unremarkable  noncontrasted appearance. Bowel is unremarkable. A few small diverticula  are noted along the sigmoid. There is no adenopathy. No " bone lesions are  apparent.      IMPRESSION:  No evidence for metastatic disease is identified at the  chest, abdomen or pelvis.    Scheduled meds:      donepezil 5 mg Oral Nightly   insulin aspart 0-9 Units Subcutaneous 4x Daily With Meals & Nightly   insulin detemir 25 Units Subcutaneous Nightly   levETIRAcetam 500 mg Oral BID   oxybutynin 5 mg Oral Daily   pantoprazole 40 mg Oral Q AM   traZODone 50 mg Oral Nightly   valsartan 160 mg Oral Q24H       ASSESSMENT  /  PLAN:  Acute Portal Vein Thrombosis  Factor V Leiden and thrombophilia  H/O Stage 1A adca Lung  HTN  HLD  ICH in 2002  Sinus Thrombosis H/O 2002  DMII  Lumbar spinal Stenosis  Seizure Disorder  AC at home with Xarleto     -tte to evaluate for heart function and signs of rv strain.  Patient alerayd on ac for any diagnosis of pe at this time.  If it was massive, would see evidence on tte  -vascular surgery consulted for protal vein thrombosis  -serial labs.  -seems reasonable for floor     Thanks for allowing us to participate in the care of this patient. LPC is always available to discuss any concerns, questions or to help coordinate the patient's care.       Landry Ho MD  [facility]  07/04/17  10:03 AM

## 2017-07-04 NOTE — PROGRESS NOTES
History:    This a very nice 77-year-old gentleman.  He has a history of factor V Leiden and thrombophilia.  He was admitted yesterday with abdominal pain and diarrhea.  He was found to have portal vein thrombosis.  At that time the patient was both on Xarelto and right toe.  He is now anticoagulated with heparin.    The patient feels good.  He has some lower abdominal discomfort.  He is tolerating by mouth liquids without difficulty  No fevers or chills.  His blood sugars have been high.    The patient had shortness of air at rest and with activity prior to admission.  He's had no dyspnea since admission.    Allergies  Review of patient's allergies indicates no known allergies.      Current Facility-Administered Medications:   •  dextrose (D50W) solution 25 g, 25 g, Intravenous, Q15 Min PRN, Landry Ho MD  •  dextrose (D50W) solution 25 g, 25 g, Intravenous, Q15 Min PRN, Ceferino Gillespie MD  •  dextrose (GLUTOSE) oral gel 15 g, 15 g, Oral, Q15 Min PRN, Landry Ho MD  •  dextrose (GLUTOSE) oral gel 15 g, 15 g, Oral, Q15 Min PRN, Ceferino Gillespie MD  •  dextrose 5 % and sodium chloride 0.45 % with KCl 20 mEq/L infusion, 100 mL/hr, Intravenous, Continuous, Ceferino Gillespie MD, Last Rate: 100 mL/hr at 07/04/17 0726, 100 mL/hr at 07/04/17 0726  •  donepezil (ARICEPT) tablet 5 mg, 5 mg, Oral, Nightly, Ceferino Gillespie MD, 5 mg at 07/03/17 2017  •  glucagon (human recombinant) (GLUCAGEN DIAGNOSTIC) injection 1 mg, 1 mg, Subcutaneous, Q15 Min PRN, Landry Ho MD  •  glucagon (human recombinant) (GLUCAGEN DIAGNOSTIC) injection 1 mg, 1 mg, Subcutaneous, Q15 Min PRN, Ceferino Gillespie MD  •  heparin (porcine) 5000 UNIT/ML injection 2,900-5,900 Units, 40-80 Units/kg, Intravenous, Q6H PRN, Sher Siddiqui MD  •  heparin 20219 units/250 mL (100 units/mL) in 0.45 % NaCl infusion, 18 Units/kg/hr, Intravenous, Titrated, Sher Siddiqui MD, Last Rate: 11.02 mL/hr at 07/04/17 0127, 15 Units/kg/hr at 07/04/17 0127  •   "HYDROcodone-acetaminophen (NORCO) 5-325 MG per tablet 1 tablet, 1 tablet, Oral, Q4H PRN, Ceferino Gillespie MD, 1 tablet at 07/04/17 0130  •  insulin aspart (novoLOG) injection 0-9 Units, 0-9 Units, Subcutaneous, 4x Daily With Meals & Nightly, Ceferino Gillespie MD, 6 Units at 07/04/17 0731  •  insulin detemir (LEVEMIR) injection 20 Units, 20 Units, Subcutaneous, Nightly, Ceferino Gillespie MD, 20 Units at 07/03/17 2257  •  levETIRAcetam (KEPPRA) tablet 500 mg, 500 mg, Oral, BID, Ceferino Gillespie MD, 500 mg at 07/04/17 0841  •  morphine injection 1 mg, 1 mg, Intravenous, Q4H PRN **AND** naloxone (NARCAN) injection 0.4 mg, 0.4 mg, Intravenous, Q5 Min PRN, Ceferino Gillespie MD  •  oxybutynin (DITROPAN) tablet 5 mg, 5 mg, Oral, Daily, Ceferino Gillespie MD, 5 mg at 07/04/17 0841  •  pantoprazole (PROTONIX) EC tablet 40 mg, 40 mg, Oral, Q AM, Ceferino Gillespie MD, 40 mg at 07/04/17 0529  •  sodium chloride 0.9 % flush 1-10 mL, 1-10 mL, Intravenous, PRN, Ceferino Gillespie MD  •  sodium chloride 0.9 % flush 10 mL, 10 mL, Intravenous, PRN, Shre Siddiqui MD  •  traZODone (DESYREL) tablet 50 mg, 50 mg, Oral, Nightly, Ceferino Gillespie MD  •  valsartan (DIOVAN) tablet 160 mg, 160 mg, Oral, Q24H, Ceferino Gillespie MD, 160 mg at 07/04/17 0842    /76  Pulse 66  Temp 97.9 °F (36.6 °C) (Oral)   Resp 16  Ht 69\" (175.3 cm)  Wt 158 lb 12.8 oz (72 kg)  SpO2 94%  BMI 23.45 kg/m2    Physical Exam    Appearance: Normally developed and nourished 77-year-old gentleman.  He is in no distress.    HEENT: Normocephalic atraumatic.  Pupils round and equal.  Pharynx clear and mucous murmurs moist.    Neck: Without adenopathy JVD or thyromegaly.    Lungs: Clear to auscultation percussion.    Heart: Regular rate and rhythm.    Abdomen: Normal active bowel sounds.  No rebound test or guarding.  Spleen was palpable.    Extremities: Slight left calf tenderness.  No clubbing, cyanosis or edema.    Neurologic: Nonfocal    Lab Results (last 24 hours)     Procedure Component Value " Units Date/Time    CBC & Differential [[id number]] Collected:  07/03/17 1114    Specimen:  Blood Updated:  07/03/17 1135    Narrative:       The following orders were created for panel order CBC & Differential.  Procedure                               Abnormality         Status                     ---------                               -----------         ------                     Scan Slide[[id number]]                                                                   CBC Auto Differential[[id number]]         Abnormal            Final result                 Please view results for these tests on the individual orders.    CBC Auto Differential [[id number]]  (Abnormal) Collected:  07/03/17 1114    Specimen:  Blood Updated:  07/03/17 1135     WBC 12.17 (H) 10*3/mm3      RBC 5.06 10*6/mm3      Hemoglobin 13.5 (L) g/dL      Hematocrit 41.1 %      MCV 81.2 fL      MCH 26.7 (L) pg      MCHC 32.8 g/dL      RDW 15.6 (H) %      RDW-SD 45.9 fl      Platelets 170 10*3/mm3      Neutrophil % 80.5 (H) %      Lymphocyte % 5.4 (L) %      Monocyte % 11.1 %      Eosinophil % 2.0 %      Basophil % 0.4 %      Immature Grans % 0.6 (H) %      Neutrophils, Absolute 9.80 (H) 10*3/mm3      Lymphocytes, Absolute 0.66 (L) 10*3/mm3      Monocytes, Absolute 1.35 (H) 10*3/mm3      Eosinophils, Absolute 0.24 10*3/mm3      Basophils, Absolute 0.05 10*3/mm3      Immature Grans, Absolute 0.07 (H) 10*3/mm3      nRBC 0.0 /100 WBC     Comprehensive Metabolic Panel [[id number]]  (Abnormal) Collected:  07/03/17 1114    Specimen:  Blood Updated:  07/03/17 1148     Glucose 195 (H) mg/dL      BUN 42 (H) mg/dL      Creatinine 1.84 (H) mg/dL      Sodium 139 mmol/L      Potassium 4.2 mmol/L      Chloride 99 mmol/L      CO2 25.5 mmol/L      Calcium 9.4 mg/dL      Total Protein 6.5 g/dL      Albumin 3.00 (L) g/dL      ALT (SGPT) 60 (H) U/L      AST (SGOT) 67 (H) U/L      Alkaline Phosphatase 201 (H) U/L      Total Bilirubin 0.9 mg/dL      eGFR Non  Amer 36 (L)  mL/min/1.73      Globulin 3.5 gm/dL      A/G Ratio 0.9 g/dL      BUN/Creatinine Ratio 22.8     Anion Gap 14.5 mmol/L     Narrative:       The MDRD GFR formula is only valid for adults with stable renal function between ages 18 and 70.    Lipase [[id number]]  (Abnormal) Collected:  07/03/17 1114    Specimen:  Blood Updated:  07/03/17 1148     Lipase 10 (L) U/L     [place] Draw [[id number]] Collected:  07/03/17 1114    Specimen:  Blood Updated:  07/03/17 1301    Narrative:       The following orders were created for panel order [place] Draw.  Procedure                               Abnormality         Status                     ---------                               -----------         ------                     Light Blue Top[[id number]]                                    Final result               Green Top (Gel)[[id number]]                                   Final result               Lavender Top[[id number]]                                      Final result               Gold Top - SST[[id number]]                                    Final result                 Please view results for these tests on the individual orders.    Light Blue Top [[id number]] Collected:  07/03/17 1114    Specimen:  Blood Updated:  07/03/17 1301     Extra Tube hold for add-on      Auto resulted       Green Top (Gel) [[id number]] Collected:  07/03/17 1114    Specimen:  Blood Updated:  07/03/17 1301     Extra Tube Hold for add-ons.      Auto resulted.       Lavender Top [[id number]] Collected:  07/03/17 1114    Specimen:  Blood Updated:  07/03/17 1301     Extra Tube hold for add-on      Auto resulted       Gold Top - SST [[id number]] Collected:  07/03/17 1114    Specimen:  Blood Updated:  07/03/17 1301     Extra Tube Hold for add-ons.      Auto resulted.       Urinalysis With / Culture If Indicated [[id number]]  (Abnormal) Collected:  07/03/17 1316    Specimen:  Urine from Urine, Clean Catch Updated:  07/03/17 1334     Color, UA Dark Yellow (A)     Appearance, UA  Cloudy (A)     pH, UA 5.5     Specific Gravity, UA 1.023     Glucose, UA Negative     Ketones, UA Trace (A)     Bilirubin, UA Negative     Blood, UA Negative     Protein, UA 30 mg/dL (1+) (A)     Leuk Esterase, UA Negative     Nitrite, UA Negative     Urobilinogen, UA 1.0 E.U./dL    Urinalysis, Microscopic Only [[id number]]  (Abnormal) Collected:  07/03/17 1316    Specimen:  Urine from Urine, Clean Catch Updated:  07/03/17 1335     RBC, UA 3-5 (A) /HPF      WBC, UA 0-2 /HPF      Bacteria, UA None Seen /HPF      Squamous Epithelial Cells, UA 0-2 /HPF      Hyaline Casts, UA 3-6 /LPF      Methodology Automated Microscopy    POC Glucose Fingerstick [[id number]]  (Abnormal) Collected:  07/03/17 1728    Specimen:  Blood Updated:  07/03/17 1731     Glucose 197 (H) mg/dL     Narrative:       Meter: [id number] : [id number] Elliot Kaur    Hemoglobin A1c [[id number]]  (Abnormal) Collected:  07/03/17 1114    Specimen:  Blood Updated:  07/03/17 1812     Hemoglobin A1C 7.56 (H) %     Narrative:       Hemoglobin A1C Ranges:    Increased Risk for Diabetes  5.7% to 6.4%  Diabetes                     >= 6.5%  Diabetic Goal                < 7.0%    POC Glucose Fingerstick [[id number]]  (Abnormal) Collected:  07/03/17 1933    Specimen:  Blood Updated:  07/03/17 1935     Glucose 149 (H) mg/dL     Narrative:       Meter: [id number] : [id number] Michelle POOL    POC Glucose Fingerstick [[id number]]  (Abnormal) Collected:  07/03/17 2346    Specimen:  Blood Updated:  07/03/17 2347     Glucose 185 (H) mg/dL     Narrative:       Meter: [id number] : [id number] Michelle POOL    aPTT [[id number]]  (Abnormal) Collected:  07/03/17 2310    Specimen:  Blood Updated:  07/03/17 2351     .8 (H) seconds     POC Glucose Fingerstick [[id number]]  (Abnormal) Collected:  07/04/17 0432    Specimen:  Blood Updated:  07/04/17 0434     Glucose 252 (H) mg/dL     Narrative:       Meter: [id number] : [id number] Michelle POOL     Lactic Acid, Plasma [[id number]]  (Normal) Collected:  07/04/17 0542    Specimen:  Blood Updated:  07/04/17 0615     Lactate 1.3 mmol/L     Magnesium [[id number]]  (Normal) Collected:  07/04/17 0543    Specimen:  Blood Updated:  07/04/17 0639     Magnesium 1.9 mg/dL     Comprehensive Metabolic Panel [[id number]]  (Abnormal) Collected:  07/04/17 0543    Specimen:  Blood Updated:  07/04/17 0639     Glucose 286 (H) mg/dL      BUN 31 (H) mg/dL      Creatinine 1.44 (H) mg/dL      Sodium 134 (L) mmol/L      Potassium 4.8 mmol/L      Chloride 96 (L) mmol/L      CO2 23.7 mmol/L      Calcium 8.5 (L) mg/dL      Total Protein 6.3 g/dL      Albumin 2.90 (L) g/dL      ALT (SGPT) 47 (H) U/L      AST (SGOT) 57 (H) U/L      Alkaline Phosphatase 184 (H) U/L      Total Bilirubin 0.9 mg/dL      eGFR Non African Amer 48 (L) mL/min/1.73      Globulin 3.4 gm/dL      A/G Ratio 0.9 g/dL      BUN/Creatinine Ratio 21.5     Anion Gap 14.3 mmol/L     Narrative:       The MDRD GFR formula is only valid for adults with stable renal function between ages 18 and 70.    Lipase [[id number]]  (Abnormal) Collected:  07/04/17 0543    Specimen:  Blood Updated:  07/04/17 0639     Lipase 12 (L) U/L     TSH [[id number]]  (Normal) Collected:  07/04/17 0543    Specimen:  Blood Updated:  07/04/17 0646     TSH 1.610 mIU/mL     CBC & Differential [[id number]] Collected:  07/04/17 0543    Specimen:  Blood Updated:  07/04/17 0721    Narrative:       The following orders were created for panel order CBC & Differential.  Procedure                               Abnormality         Status                     ---------                               -----------         ------                     Scan Slide[[id number]]                                       Final result               CBC Auto Differential[[id number]]        Abnormal            Final result                 Please view results for these tests on the individual orders.    CBC Auto Differential [[id number]]   (Abnormal) Collected:  07/04/17 0543    Specimen:  Blood Updated:  07/04/17 0721     WBC 9.37 10*3/mm3      RBC 5.02 10*6/mm3      Hemoglobin 13.4 (L) g/dL      Hematocrit 41.2 %      MCV 82.1 fL      MCH 26.7 (L) pg      MCHC 32.5 (L) g/dL      RDW 15.4 (H) %      RDW-SD 46.6 fl      MPV -- fL       .        Platelets 168 10*3/mm3      Neutrophil % 73.5 %      Lymphocyte % 10.9 (L) %      Monocyte % 10.4 %      Eosinophil % 3.4 %      Basophil % 0.7 %      Immature Grans % 1.1 (H) %      Neutrophils, Absolute 6.89 10*3/mm3      Lymphocytes, Absolute 1.02 10*3/mm3      Monocytes, Absolute 0.97 10*3/mm3      Eosinophils, Absolute 0.32 10*3/mm3      Basophils, Absolute 0.07 10*3/mm3      Immature Grans, Absolute 0.10 (H) 10*3/mm3      nRBC 0.0 /100 WBC     Scan Slide [[id number]] Collected:  07/04/17 0543    Specimen:  Blood Updated:  07/04/17 0721     Ovalocytes Mod/2+     WBC Morphology Normal     Large Platelets Slight/1+    POC Glucose Fingerstick [[id number]]  (Abnormal) Collected:  07/04/17 0721    Specimen:  Blood Updated:  07/04/17 0722     Glucose 273 (H) mg/dL     Narrative:       Meter: [id number] : [id number] Sinai Muhammad    aPTT [[id number]]  (Abnormal) Collected:  07/04/17 0727    Specimen:  Blood Updated:  07/04/17 0825     PTT 58.8 (H) seconds           Imp:    1.  Thrombophilia with portal vein thrombosis.  The patient has factor V Leiden.  He has a history of both adenocarcinoma of the lung and prostate cancer.  Underlying malignancy is a definite possibility.    2.  Type 2 diabetes mellitus.  Blood sugars are high.    3.  Essential hypertension.    4.  Hyperlipidemia.    5.  History of and no CVA of the lung and prostate cancer.    6.  Stool heme positive on admission new    7.  Acute kidney injury with underlying chronic renal insufficiency.  Stage III B    Plan:    We will check an echocardiogram.    We will continue the patient's current heparin.    I will increase his Levemir dose.    I will  advance his diet.    Ceferino Gillespie MD  7/4/2017  9:11 AM

## 2017-07-04 NOTE — PROGRESS NOTES
Subjective .  Modest improvement in fatigue , worsening foot pain persisting    REASONS FOR FOLLOWUP:    1. Previous assessment per superior sagittal sinus thrombosis, left frontal intracerebral hemorrhage, acute calf vein thrombosis, hypercoagulable assessment positive for heterozygosity for factor V 5 Leiden gene mutation.   2. Ongoing anticoagulation with Coumadin followed by Dr. Gillespie.   3. Admission on 08/04/2014 with severe right knee pain, spontaneous infusion, calf vein thrombosis despite therapeutic anticoagulation.   4. Repeat consultation 08/05/2014 leading to chest CT revealing irregula r nodule in the right apex indeterminate ? malignancy.   5. Thoracic surgery consultation, Neurovascular surgery consultation obtained, IVC filter placed, exploratory right video assisted fluoroscopy with wedge resection right upper lobe lymphadenectomy 08/15/ 2014, pathology consistent with primary lung adenocarcinoma, stage [id number] stage IA, EGFR mutation, positive for Exon 19 mutation, (potentially responsive to EGFR tyrosine kinase therapy).   6. The patient was seen in the office 09/23/2014, adjuvant therapy not felt necessary, IVC filter to be removed, continue Xarelto therapy thereafter recommended  7. Patient reviewed December 21, 2016 with ongoing anemia-microcytic, hypochromic  8. Determination of considerable iron deficiency and B12 deficiency?  Pernicious anemia, replacement therapy is initiated  9. Patient reviewed March 8, considerable improvement per anemia though iron deficiency persists, patient with progressive foot pain, foot lesions developing, follow-up scan obtained  10. Patient reviewed April 3, CT scans negative, anemia resolved, potential lower extremity vasculopathy-dermatologic assessment pursued  11. Patient reviewed May 31, 2017, no significant improvement per lower extremity vasculopathy    HISTORY OF PRESENT ILLNESS:  The patient is a 77 y.o. year old male.  This patient was admitted to  the hospital intensive care unit yesterday with a at least 3 days history of persistent abdominal pain and 67/10 in the periumbilical area and epigastric area continues associated with mild nausea refused they have 3 N1 of dictation.  He has no abdominal distention no fevers no chills no diaphoresis and no retrosternal chest pain.  The time of admission through the emergency room he was found to have significant abnormalities in liver function tests filter assessment documented to a CT scan of the abdomen to the patient had extensive portal vein thrombosis.  He was also noticed that the patient splenomegaly.  Thrombosis of the splenic vein.  Obviously the patient was admitted to the intensive care unit he was placed on heparin and actually his abdominal pain has substantially subsided since then to the point of her solution and this is starting to have a and regular diet today.  The patient states today that the abdominal pain is no new has been ongoing on for at least 2to3 months Mild and intensity progressive to the point that he got yesterday.  Also he has lost his appetite take completely and he has lost 20 pounds of weight in the last 2-3 months.  Again he has not had any fever or chills he doesn't have any cough or sputum production he has not had any shortness of breath.  He denies any changes in his bowel habits.  He denies any hematemesis or melena or interim range up.  He denies any hematuria.  He hasn't had any frequency to urinate and he denies any recent urinary tract infections.  Ibuprofen skin lesions that were documented before were treated by his primary internal medicine physician with resolution.  No new lesions evolving.    History of Past  Illness     Mr. Gonzalez is a 77-year-old male who we had initially seen in consultation 02/04 through 02/14/2002 having presented at that point with a superior sagittal sinus thrombosis and left frontal intracerebral hemorrhage. Evaluation thereafter included a  hypercoagulable assessment when he was found also to have an acute calf vein thrombosis and spontaneous calf vein thrombosis on the left . The patient fortunately responded well to rehabilitation and anticoagulation. He was eventually placed on Coumadin long term and hypercoagulable assessment had revealed findings consistent with heterozygosity for factor V Leiden gene mutation. The patie nt did not have followup with us, though that is not exactly certain why.       Mr. Gonzalez was later admitted 08/04/2014 through Dr. Gillespie. The patient had gone to [employer] and while standing in [employer] he noticed his right leg starting to cramp. He began to have noticeable limping thereafter and then when knee pain became so severe his wife called 911 and he was brought to [facility] emergency room. In the ER he was found to have calf vein thrombosis involving the right leg as well as a swollen k n ee with effusion. He was admitted to be seen by Orthopedics and was found to incidentally have a prothrombin time 3.1 and thus we were asked to see him for his thrombosis despite adequate anticoagulation. At that point, he was seen by this physician and a s a result of there being concern about additional factor such as a malignancy, a CT chest, abdomen and pelvis was obtained 08/05/2014. This revealed unexpectedly an irregular 2.0 x 1.1 cm lesion in the right apical region. This was concerning for a malig n jorge. He also had non-obstructing left renal calculus and extensive lumbar spinal degenerative disease. The patient was seen by Orthopedics again with a knee tap. (Followup is still remaining concerning this). The patient was also seen by Thoracic surgeangely joyner . Please note that Dopplers 08/06/2014 revealed a subacute venous thrombosis in the right popliteal vein as well as subacute thrombus in the right calf vein. The patient was seen by Dr. Meyer per Thoracic surgery and a PET scan was obtained showing the no d ule in  right lung apex to demonstrate low level activity somewhat indeterminate with an SUV of 2.0. No other abnormalities were present. It was felt that the patient should proceed to surgical resection. An IVC filter was placed prior to surgery 08/13/201 4 by Dr. Pastor Trinh. PFTs revealed a moderate airway restriction and after discussion on 08/15/2014 the patient underwent exploratory bronchoscopy, exploratory right video assisted thoracoscopy with wedge resection, right upper lobe and lymphadenectom y . The patient remains hospitalized at this point to have the surgery rather than return for subsequent surgery. Pathology per surgical specimen revealed a primary lung adenocarcinoma, demonstrating a lepidic growth pattern. R4, station 7 and R10 nodes wer e all negative for disease. The tumor size was 2.1 x 1.3 x 1.2 unifocal adenocarcinoma. His pathologic staging was a pT1b pN0M0- stage IA. Now available also at the time of this dictation is the finding that the tumor was EGFR mutation positive with a le s ion positive for [id number] mutation, Exon 19. These are reported to correlate with responsiveness to EGFR tyrosine kinase inhibitor therapies. The patient was seen by Dr. Meyer postoperative and is doing well and is also being seen by Dr. Trinh with consideration of removal of his IVC filter.       The patient now presents back to our practice for review of all of this. We discussed his time line up to this point. In a long discussion it is felt that adjuvant chemotherapy is not warranted in Mr. Gonzalez's care. Reasonably followup is however likely with at least a chest x-ray on a yearly basis possibly through Dr. Glilespie.       The patient's had follow-up through Dr. CHRISTIAN Walker since his last visit here as well as reviews to Dr. Meyer.  This includes evidently a trial of oral iron when the patient was found to be anemic previously as well as recognition of the patient's kidney function-CKD 3.  She also was seen in  emergency room in June of this year after falling from a ladder with laboratory studies revealing anemia with hemoglobin 8.5, hematocrit 30.5 and MCV that Dr. 63.3 with MCH of 17.6 and an MCHC of 27.9, platelet count of 333,000 with normal differential.  In reviewing the patient's blood counts he is definitely developed microcytosis and hypochromia since June and, as such, when immediately as concerned about iron deficiency as an underlying issue.  He also has chronic leukocytosis that appears to be unchanged from previous.  We discussed IV iron as a treatment to address the immediate issue and thereafter try to determine the source of blood loss if indeed iron deficiency is documented.     The patient went on to be treated with both IV Feraheme ×2 on December 23 and December 30 as well as be given injections of B12 both visits.  As he seen back today January 30 he is feeling some better and his wife has also noticed a difference.  We plan to continue the B12 loading over the next several weeks as well as assessing him for pernicious anemia laboratories today.  The patient is now seen back March 08, 2017.  While he's had some improvement in his energy level he still has pain in his feet which has progressed associated with a mildly raised, violaceous group of lesions involving his calf and left foot have developed.  He indicates he is to see a podiatrist in the near future and with the findings of pernicious anemia, ongoing iron deficiency, and unexplained rash developing in the setting of a history of non-small cell lung cancer I requested a PET/CT-?  Paraneoplasia or even new malignant process.    The patient initially was to undergo a PET/CT but this was declined.  A CT of chest and pelvis was obtained with no evidence of metastatic disease identified.  The patient hematologically went on to further improve but has, unfortunately, considerable pain in his lower extremities associated with a rash that has a  partially vasculitic appearance.  The areas in question are erythematous, serpiginous, mildly raised, and very tender to palpation.  He's been seen by podiatry and treated topically without effect.   The patient was thereafter seen evidently by both dermatology and vascular surgery.  Unfortunately he still has significant pain though this is managed in part by current use of pain medications with modest use thus far.  He is encouraged to use this more frequently and plans are to contact Dr. CHRISTIAN Walker as well.    Past Medical History:   Diagnosis Date   • Arthralgia    • Arthritis    • Back pain    • Chronic mixed headache syndrome    • Deep venous thrombosis of right popliteal vein     of right knee hemarthrosis   • Diabetes mellitus    • Diverticulosis    • DJD (degenerative joint disease)    • Factor V Leiden    • GERD (gastroesophageal reflux disease)    • Hypercholesterolemia    • Hypertension    • Intracerebral hemorrhage    • Lumbar spinal stenosis    • Muscular aches    • Neck pain    • Non-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   • Thrombosis     Lower extremity       ONCOLOGIC HISTORY:  (History from previous dates can be found in the separate document.)    No current facility-administered medications on file prior to encounter.      Current Outpatient Prescriptions on File Prior to Encounter   Medication Sig Dispense Refill   • Calcium Carbonate-Vitamin D (CALCIUM-D) 600-400 MG-UNIT tablet Take 1 tablet by mouth daily.     • carvedilol (COREG) 12.5 MG tablet      • cilostazol (PLETAL) 50 MG tablet      • clotrimazole-betamethasone (LOTRISONE) 1-0.05 % cream      • HYDROcodone-acetaminophen (NORCO) 5-325 MG per tablet Take 1 tablet by mouth every 6 (six) hours as needed.     • Insulin Syringe-Needle U-100 (RELION INSULIN SYR  "0.5ML/31G) 31G X 5/16\" 0.5 ML misc      • irbesartan-hydrochlorothiazide (AVALIDE) 300-12.5 MG tablet Take 1 tablet by mouth daily.     • pravastatin (PRAVACHOL) 80 MG tablet Take  by mouth daily.     • rivaroxaban (XARELTO) tablet Take 20 mg by mouth daily with dinner.     • vitamin B-6 (PYRIDOXINE) 100 MG tablet Take 100 mg by mouth.         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     Social History Main Topics   • Smoking status: Former Smoker     Packs/day: 1.00     Years: 20.00     Quit date: 2/8/1996   • Smokeless tobacco: Not on file   • Alcohol use No   • Drug use: No   • Sexual activity: No     Other Topics Concern   • Not on file     Social History Narrative         Cancer-related family history includes Colon cancer in his mother; Lung cancer (age of onset: 77) in his brother.     Review of Systems    General: no fever, chills,significant fatigue,20 lb loss weight changes, and lack of appetite.  Eyes: no epiphora, xerophthalmia,conjunctivitis, pain, glaucoma, blurred vision, blindness, secretion, photophobia, proptosis, diplopia.  Ears: no otorrhea, tinnitus, otorrhagia, deafness, pain, vertigo.  Nose: no rhinorrhea, epistaxis, alteration in perception of odors, sinuses pressure.  Mouth: no alteration in gums or teeth,  ulcers, no difficulty with mastication or deglut ion, no odynophagia.  Neck: no masses or pain, no thyroid alterations, no pain in muscles or arteries, no carotid odynia, no crepitation.  Respiratory: no cough, sputum production, dyspnea, trepopnea, pleuritic pain, hemoptysis.  Heart: no syncope, irregularity, palpitations, angina, orthopnea, paroxysmal nocturnal dyspnea.  Vascular Venous: no " tenderness,edema, palpable cords, postphlebitic syndrome, skin changes or ulcerations.  Vascular Arterial: no distal ischemia, claudication, gangrene, neuropathic ischemic pain, skin ulcers, paleness or cyanosis.  GI: no dysphagia, odynophagia, no regurgitation, no heartburn,no indigestion,no nausea,no vomiting,no hematemesis ,no melena,no jaundice,no distention, no obstipation,no enterorrhagia,no proctalgia,no anal  lesions, nochanges in bowel habits. Abdominal pain as stated  : no frequency, hesitancy, hematuria, discharge, pain.  Musculoskeletal: no muscle or tendon pain or inflammation, joint pain, edema, functional limitation, fasciculations, mass.  Neurologic: no headache, seizures, alterations on Craneal nerves, no motor or senssory deficit, normal coordination, no alteration in memory, orientation, calculation,writting, verbal or written language.  Skin: no rashes, pruritus or localized lesions.  Psychiatric: no anxiety, depression, agitation, delusions, proper insight.        A comprehensive 14 point review of systems was performed and was negative except as mentioned.    Objective      Vitals:    07/04/17 0603 07/04/17 0633 07/04/17 0703 07/04/17 0720   BP: 132/76 128/63 128/71    BP Location:       Patient Position:       Pulse: 59 60 66    Resp:       Temp:    97.9 °F (36.6 °C)   TempSrc:    Oral   SpO2: 95% 94% 94%    Weight:       Height:         Current Status 5/31/2017   ECOG score 1       Physical Exam    GENERAL: Well-developed, well-nourished male in no acute distress.   SKIN: Warm, dry,healed lesions in le  HEAD: Normocephalic.   EYES: Pupils equal, round and reactive to light. EOMs intact. Conjunctivae normal.   EARS: Hearing intact.   NOSE: Septum midline. No excoriations or nasal discharge.   MOUTH: Tongue is well papillated; no stomatitis or ulcers. Lips normal.   THROAT: Oropharynx without lesions or exudates.   NECK: Supple with good range of motion; no thyromegaly or masses, no JVD or  bruits.   LYMPHATICS: No cervical, supraclavicular, axillary or inguinal adenopathy.   CHEST: Decreased breath sounds bilateral bases.   CARDIAC: Status post right video assisted thoracoscopy with healed incision sites.   ABDOMEN: Soft, nontender with no organomegaly or masses. No abdominal pain, palpable spleen 3 cm blcm  EXTREMITIES: Without palpable cords or positive Lucius's signs No clubbing, cyanosis or edema.   NEURO: No focal neurological deficits.         RECENT LABS:CT ABDOMEN AND PELVIS WITH IV CONTRAST      HISTORY: 77-year-old male with abnormal appearance of the portal vein on  a noncontrasted CT of the abdomen and pelvis performed earlier today.      TECHNIQUE: 3 mm images were obtained through the abdomen and pelvis  after the administration of IV contrast. Compared with the noncontrasted  CT abdomen and pelvis performed earlier today.      FINDINGS: There is extensive acute thrombosis of the main portal vein,  right and left portal veins, and nearly the entire splenic vein. The SMV  is patent. The spleen is enlarged and there are 3 small wedge-shaped  regions of decreased enhancement. There is a heterogeneous enhancement  pattern throughout the central aspect of the liver. There is a tiny  amount of perihepatic and perisplenic ascites and there is a small  amount of free fluid within the dependent aspect of the pelvis. No acute  abnormality is seen involving the pancreas, adrenals, or kidneys. Single  nonobstructing left renal stone is noted. No acute bowel abnormality is  seen. Prostatic brachytherapy seeds again noted.      IMPRESSION:  1. Extensive acute thrombosis of the portal vein, right and left  intrahepatic portal veins, and there is extensive acute thrombosis of  nearly the entire splenic vein. The SMV is patent.  2. Development of splenomegaly and there are 3 small evolving splenic  infarctions. Very small volume of free fluid within the abdomen and  pelvis.      Discussed with   Artur.      This report was finalized on 7/3/2017 4:27 PM by Dr. Stephanie Adams MD  Hematology WBC   Date Value Ref Range Status   07/04/2017 9.37 4.50 - 10.70 10*3/mm3 Final     RBC   Date Value Ref Range Status   07/04/2017 5.02 4.60 - 6.00 10*6/mm3 Final     Hemoglobin   Date Value Ref Range Status   07/04/2017 13.4 (L) 13.7 - 17.6 g/dL Final     Hematocrit   Date Value Ref Range Status   07/04/2017 41.2 40.4 - 52.2 % Final     Platelets   Date Value Ref Range Status   07/04/2017 168 140 - 500 10*3/mm3 Final      Assessment/Plan this patient has thrombophilia associated with FACTOR V Leiden mutation.  He has had extensive thrombosis in the failure in lower extremities he has had sagittal vein thrombosis he has had a chronic anticoagulation initiated with Coumadin and subsequently with a Xarelto.  The patient has been taking this Xarelto religiously according to the medical records are documented in the emergency room.  However submitted most is the fact that the patient has had abdominal pain subsided significantly for the last 2-3 months he has lost his appetite and he has lost 20 pounds in weight.  They and the patient has had in March of this year reassessment of previous malignancy specifically lung cancer having no evidence for recurrence.  On the thrombosis of the portal vein is an entity that can be associated with several malignancies in the gastrointestinal tract including pancreatic cancer as well as liver cancer.  The patient has developed a significant anemia related to iron deficiency and he was treated subsequently for these by  this incident the summer of 2016.  Therefore medically to 1 that he will be a seen is normally they thrombophilia associated with  Leiden mutation and also thrombophilia associated with some sort of malignancy.  Even though the radiological assessment of his CT scan shows nothing to suggest a hepatocellular carcinoma or a pancreatic cancer and makes it under  reasonably to proceed with tumor markers including a CA 19-9 as well as CEA and also the alpha-fetoprotein.    Given the history of prostate cancer and going to go ahead and proceed per PSA.  I'm going to repeat his thrombophilia labs including lupus anticoagulant and anticardiolipin antibody profile along with a C-reactive protein.  Thrombophilia CAN HAPPEN IN patient WITH Monoclonal protein.  For this reason I'm going to proceed with a serum protein ELECTROPHORESIS.  From the point of your treatment the patient is going to require continuation of the brain and eventually initiation of transition with Coumadin.  Coumadin will require to be follow-up in the office in the future down going to go ahead and make appointments to follow-up with Dr. JHAVERI the close future for pro times and so forth.    Eventually we'll continue related to repeat the CT scan of the abdomen to see if there resolution of the portal vein thrombosis and splenic vein thromboses and to see what happens with his enlarged spleen that is a congestive form of splenomegaly.  Finally the patient is stated that has not been lOng since he had a  colonoscopy.  Again the fact that he developed IRON deficiency anemia on blood thinner medication makes it to WONDER FOR source of  malignancy the gastrointestinal tract.

## 2017-07-04 NOTE — PLAN OF CARE
Problem: Patient Care Overview (Adult)  Goal: Plan of Care Review  Outcome: Ongoing (interventions implemented as appropriate)    07/04/17 1924   Coping/Psychosocial Response Interventions   Plan Of Care Reviewed With patient;spouse   Patient Care Overview   Progress no change   Outcome Evaluation   Outcome Summary/Follow up Plan Heparin gtt therapeutic, no complaints of pain, VSS, transfer to telemetry bed         Problem: Skin Integrity Impairment, Risk/Actual (Adult)  Goal: Identify Related Risk Factors and Signs and Symptoms  Outcome: Ongoing (interventions implemented as appropriate)  Goal: Skin Integrity/Wound Healing  Outcome: Ongoing (interventions implemented as appropriate)    Problem: Fall Risk (Adult)  Goal: Identify Related Risk Factors and Signs and Symptoms  Outcome: Ongoing (interventions implemented as appropriate)  Goal: Absence of Falls  Outcome: Ongoing (interventions implemented as appropriate)    Problem: Diabetes, Type 2 (Adult)  Goal: Signs and Symptoms of Listed Potential Problems Will be Absent or Manageable (Diabetes, Type 2)  Outcome: Ongoing (interventions implemented as appropriate)

## 2017-07-04 NOTE — PLAN OF CARE
Problem: Patient Care Overview (Adult)  Goal: Plan of Care Review  Outcome: Ongoing (interventions implemented as appropriate)    07/04/17 0614   Coping/Psychosocial Response Interventions   Plan Of Care Reviewed With patient   Patient Care Overview   Progress no change   Outcome Evaluation   Outcome Summary/Follow up Plan Pt remains on CCU for observation. On heparin gtt at this time, adjusted per protocol. VSS, with only minimal complaints of pain due to positioning. Will continue to monitor.          Problem: Skin Integrity Impairment, Risk/Actual (Adult)  Goal: Identify Related Risk Factors and Signs and Symptoms  Outcome: Ongoing (interventions implemented as appropriate)  Goal: Skin Integrity/Wound Healing  Outcome: Ongoing (interventions implemented as appropriate)    Problem: Fall Risk (Adult)  Goal: Identify Related Risk Factors and Signs and Symptoms  Outcome: Ongoing (interventions implemented as appropriate)  Goal: Absence of Falls  Outcome: Ongoing (interventions implemented as appropriate)    Problem: Diabetes, Type 2 (Adult)  Goal: Signs and Symptoms of Listed Potential Problems Will be Absent or Manageable (Diabetes, Type 2)  Outcome: Ongoing (interventions implemented as appropriate)

## 2017-07-05 NOTE — THERAPY EVALUATION
Acute Care - Physical Therapy Initial Evaluation  [facility]     Patient Name: Alfredo Gonzalez  : 1940  MRN: [id number]  Today's Date: 2017   Onset of Illness/Injury or Date of Surgery Date: (P) 17     Referring Physician: Gillespie (P)      Admit Date: 7/3/2017     Visit Dx:    ICD-10-CM ICD-9-CM   1. Acute venous thrombosis I82.90 453.9   2. Generalized abdominal pain R10.84 789.07   3. General weakness R53.1 780.79     Patient Active Problem List   Diagnosis   • Non-small cell lung cancer   • Chest pain   • Chronic kidney disease (CKD)   • Microcytic hypochromic anemia   • History of prostate cancer   • Anemia, B12 deficiency   • Iron deficiency anemia   • Pernicious anemia   • Portal vein thrombosis   • Portal vein thrombosis     Past Medical History:   Diagnosis Date   • Arthralgia    • Arthritis    • Back pain    • Chronic mixed headache syndrome    • Deep venous thrombosis of right popliteal vein     of right knee hemarthrosis   • Diabetes mellitus    • Diverticulosis    • DJD (degenerative joint disease)    • Factor V Leiden    • GERD (gastroesophageal reflux disease)    • Hypercholesterolemia    • Hypertension    • Intracerebral hemorrhage    • Lumbar spinal stenosis    • Muscular aches    • Neck pain    • Non-small cell carcinoma of lung     Stage IA, EGFR mutated   • Obesity    • Peripheral neuropathy    • Prostate cancer     Status post seed implant for radiation therapy   • Shortness of breath    • Stroke     H/O CVA in .   • Thrombosis     Lower extremity     Past Surgical History:   Procedure Laterality Date   • BRAIN SURGERY     • CHOLECYSTECTOMY     • HERNIA REPAIR      Bilateral inguinal hernia and umbilical hernia repair   • LUNG REMOVAL, PARTIAL  2014   • PROSTATE SURGERY     • VENA CAVA FILTER PLACEMENT            PT ASSESSMENT (last 72 hours)      PT Evaluation       17 1100 17 0928    Rehab Evaluation    Document Type (P)  evaluation  -ELICEO     Subjective  Information (P)  no complaints;agree to therapy  -     Patient Effort, Rehab Treatment (P)  good  -     Symptoms Noted During/After Treatment (P)  none  -     General Information    Patient Profile Review (P)  yes  -     Onset of Illness/Injury or Date of Surgery Date (P)  07/03/17  -     Referring Physician (P)  Verna  -     General Observations (P)  In bed, family at bedside  -     Pertinent History Of Current Problem (P)  Pt presented to ED with abdominal pain that had gooten progressively worse over the course of 2 to 3 days.  Pt found to have portal vein thrombosis.  -     Precautions/Limitations (P)  fall precautions;seizure precautions  -     Prior Level of Function (P)  independent:;all household mobility;community mobility;ADL's  -     Equipment Currently Used at Home (P)  cane, straight   not used consistently  - cane, straight   Daughter states he does not use it consistently  -    Plans/Goals Discussed With (P)  patient and family  -     Barriers to Rehab (P)  none identified  -     Living Environment    Lives With (P)  spouse  - spouse  -    Living Arrangements (P)  house  - house  -    Home Accessibility (P)  stairs to enter home  - no concerns  -    Number of Stairs to Enter Home (P)  3  -     Stair Railings at Home (P)  --   outside  - other (see comments)   Stairs to basement  -    Transportation Available  car;family or friend will provide  -    Clinical Impression    Patient/Family Goals Statement (P)  Increase strength, return to PLOF  -     Criteria for Skilled Therapeutic Interventions Met (P)  yes  -     Pathology/Pathophysiology Noted (Describe Specifically for Each System) (P)  musculoskeletal  -     Impairments Found (describe specific impairments) (P)  gait, locomotion, and balance;muscle performance  -     Functional Limitations in Following Categories (Describe Specific Limitations) (P)  self-care;home management  -     Rehab  Potential (P)  good, to achieve stated therapy goals  -     Vital Signs    Pre SpO2 (%) (P)  96  -ELICEO     O2 Delivery Pre Treatment (P)  room air  -     Post SpO2 (%) (P)  96  -ELICEO     O2 Delivery Post Treatment (P)  room air  -ELICEO     Pre Patient Position (P)  Supine  -     Intra Patient Position (P)  Standing  -     Post Patient Position (P)  Supine  -ELICEO     Rest Breaks  (P)  0  -     Pain Assessment    Pain Assessment (P)  No/denies pain  -     Cognitive Assessment/Intervention    Current Cognitive/Communication Assessment (P)  impaired  -     Orientation Status (P)  oriented to;person;situation;disoriented to;place  -     Follows Commands/Answers Questions (P)  100% of the time  -     Personal Safety (P)  mild impairment;decreased awareness, need for assist;decreased awareness, need for safety;decreased insight to deficits  -     Personal Safety Interventions (P)  fall prevention program maintained;gait belt;muscle strengthening facilitated;nonskid shoes/slippers when out of bed;supervised activity  -     ROM (Range of Motion)    General ROM (P)  no range of motion deficits identified  -     MMT (Manual Muscle Testing)    General MMT Assessment (P)  upper extremity strength deficits identified;lower extremity strength deficits identified  -     General MMT Assessment Detail (P)  General weakness noted with functional mobility  -     Bed Mobility, Assessment/Treatment    Bed Mobility, Assistive Device (P)  bed rails;head of bed elevated  -     Bed Mob, Supine to Sit, [place] (P)  supervision required;verbal cues required  -     Bed Mob, Sit to Supine, [place] (P)  supervision required;verbal cues required  -     Bed Mobility, Impairments (P)  strength decreased  -     Bed Mobility, Comment (P)  Pt requires increased time to perform bed mobility  -     Transfer Assessment/Treatment    Transfers, Sit-Stand [place] (P)  contact guard assist;verbal cues required   VCs  for hand placement  -     Transfers, Stand-Sit [place] (P)  contact guard assist;verbal cues required   VCs for hand placement  -     Transfers, Sit-Stand-Sit, Assist Device (P)  rolling walker  -     Transfer, Safety Issues (P)  balance decreased during turns;step length decreased;impulsivity  -     Transfer, Impairments (P)  strength decreased;impaired balance  -     Transfer, Comment (P)  Pt somewhat impulsive and let go of the RW to head into bathroom to urinate with little warning.  Pt very unsteady on feet with no AD and requires use of hand held assist or handrails for safety.  -     Gait Assessment/Treatment    Gait, [place] Level (P)  contact guard assist  -     Gait, Assistive Device (P)  rolling walker  -     Gait, Distance (Feet) (P)  150  -     Gait, Gait Pattern Analysis (P)  swing-through gait  -     Gait, Gait Deviations (P)  jake decreased;step length decreased;stride length decreased;forward flexed posture  -     Gait, Safety Issues (P)  balance decreased during turns;step length decreased  -     Gait, Impairments (P)  strength decreased;impaired balance  -     Gait, Comment (P)  No LOB noted with AD while ambulating in javed.  Pt requires verbal cues to stand upright and step closer to AD.  -     Motor Skills/Interventions    Additional Documentation (P)  Balance Skills Training (Group)  -     Balance Skills Training    Sitting-Level of Assistance (P)  Close supervision  -     Sitting-Balance Support (P)  Left upper extremity supported;Feet supported  -     Sitting-Balance Activities (P)  --   Seated exercise  -     Sitting # of Minutes (P)  3  -     Standing-Level of Assistance (P)  Contact guard  -     Static Standing Balance Support (P)  assistive device  -     Standing Balance # of Minutes (P)  1  -ELICEO     Gait Balance-Level of Assistance (P)  Contact guard  -     Gait Balance Support (P)  assistive device  -     Therapy Exercises     Bilateral Lower Extremities (P)  sitting;AROM:;10 reps;ankle pumps/circles;hip flexion;LAQ  -     Positioning and Restraints    Pre-Treatment Position (P)  in bed  -     Post Treatment Position (P)  bed  -ELICEO     In Bed (P)  supine;fowlers;call light within reach;encouraged to call for assist;exit alarm on  -       07/03/17 1912 07/03/17 1903    General Information    Equipment Currently Used at Home  cane, straight  -MB    Living Environment    Lives With child(taryn), adult;spouse  -MB     Living Arrangements house  -MB     Home Accessibility no concerns  -MB     Stair Railings at Home other (see comments)   Basement stairs  -MB     Type of Financial/Environmental Concern none  -MB     Transportation Available car  -MB       User Key  (r) = Recorded By, (t) = Taken By, (c) = Cosigned By    Initials Name Provider Type    BH Becky S [place], RN Case Manager    CHANDNI Alvarez, RN Registered Nurse    ELICEO Santacruz, PT Student PT Student          Physical Therapy Education     Title: PT OT SLP Therapies (Done)     Topic: Physical Therapy (Done)     Point: Mobility training (Done)    Learning Progress Summary    Learner Readiness Method Response Comment Documented by Status   Patient Acceptance E ,NILES   07/05/17 1149 Done   Family Acceptance E ,[facility] 07/05/17 1149 Done               Point: Home exercise program (Done)    Learning Progress Summary    Learner Readiness Method Response Comment Documented by Status   Patient Acceptance E CHANTAL,NILES   07/05/17 1149 Done   Family Acceptance E ,[facility] 07/05/17 1149 Done               Point: Body mechanics (Done)    Learning Progress Summary    Learner Readiness Method Response Comment Documented by Status   Patient Acceptance E CHANTAL,NILES   07/05/17 1149 Done   Family Acceptance E ,[facility] 07/05/17 1149 Done               Point: Precautions (Done)    Learning Progress Summary    Learner Readiness Method Response Comment Documented by Status   Patient Acceptance E  VU,NR   07/05/17 1149 Done   Family Acceptance E VU,NR   07/05/17 1149 Done                      User Key     Initials Effective Dates Name Provider Type Discipline     05/08/17 -  Leighton Santacruz, PT Student PT Student PT                PT Recommendation and Plan  Anticipated Equipment Needs At Discharge: (P) front wheeled walker  Anticipated Discharge Disposition: (P) home with home health  Planned Therapy Interventions: (P) balance training, gait training, home exercise program, patient/family education, strengthening  PT Frequency: (P) daily  Plan of Care Review  Plan Of Care Reviewed With: (P) patient  Outcome Summary/Follow up Plan: (P) Pt presented to ED with abdominal pain that got progressively worse over the course of 2 to 3 days.  Pt not safe to ambulate without AD.  No LOB noted while ambulating with AD but requires cues to stand upright and closer to RW.  Pt displays strength, balance, and functional mobility deficits.  Pt will benefit from skilled PT to address these deficits.          IP PT Goals       07/05/17 1150          Transfer Training PT LTG    Transfer Training PT LTG, Date Established (P)  07/05/17  -ELICEO      Transfer Training PT LTG, Time to Achieve (P)  1 wk  -ELICEO      Transfer Training PT LTG, Activity Type (P)  all transfers  -ELICEO      Transfer Training PT LTG, [place] Level (P)  supervision required  -ELICEO      Transfer Training PT LTG, Assist Device (P)  walker, rolling  -ELICEO      Gait Training PT LTG    Gait Training Goal PT LTG, Date Established (P)  07/05/17  -ELICEO      Gait Training Goal PT LTG, Time to Achieve (P)  1 wk  -ELICEO      Gait Training Goal PT LTG, [place] Level (P)  supervision required  -ELICEO      Gait Training Goal PT LTG, Assist Device (P)  walker, rolling  -ELICEO      Gait Training Goal PT LTG, Distance to Achieve (P)  300  -ELICEO      Stair Training PT LTG    Stair Training Goal PT LTG, Date Established (P)  07/05/17  -ELICEO      Stair Training Goal PT LTG, Time to Achieve  (P)  1 wk  -ELICEO      Stair Training Goal PT LTG, Number of Steps (P)  5  -ELICEO      Stair Training Goal PT LTG, [place] Level (P)  contact guard assist  -ELICEO      Stair Training Goal PT LTG, Assist Device (P)  1 handrail  -ELICEO        User Key  (r) = Recorded By, (t) = Taken By, (c) = Cosigned By    Initials Name Provider Type    ELICEO Leighton Noam, PT Student PT Student                Outcome Measures       07/05/17 1100          How much help from another person do you currently need...    Turning from your back to your side while in flat bed without using bedrails? (P)  4  -ELICEO      Moving from lying on back to sitting on the side of a flat bed without bedrails? (P)  4  -ELICEO      Moving to and from a bed to a chair (including a wheelchair)? (P)  3  -ELICEO      Standing up from a chair using your arms (e.g., wheelchair, bedside chair)? (P)  3  -ELICEO      Climbing 3-5 steps with a railing? (P)  2  -ELICEO      To walk in hospital room? (P)  3  -ELICEO      AM-PAC 6 Clicks Score (P)  19  -ELICEO      Functional Assessment    Outcome Measure Options (P)  AM-PAC 6 Clicks Basic Mobility (PT)  -ELICEO        User Key  (r) = Recorded By, (t) = Taken By, (c) = Cosigned By    Initials Name Provider Type    ELCIEO Leighton Noam, PT Student PT Student           Time Calculation:         PT Charges       07/05/17 1132          Time Calculation    Start Time (P)  1110  -ELICEO      Stop Time (P)  1129  -ELICEO      Time Calculation (min) (P)  19 min  -ELICEO      PT Received On (P)  07/05/17  -ELICEO      PT - Next Appointment (P)  07/06/17  -ELICEO      PT Goal Re-Cert Due Date (P)  07/12/17  -ELICEO        User Key  (r) = Recorded By, (t) = Taken By, (c) = Cosigned By    Initials Name Provider Type    ELICEO Leighton Noam, PT Student PT Student          Therapy Charges for Today     Code Description Service Date Service Provider Modifiers Qty    88442242486 HC PT EVAL MOD COMPLEXITY 2 7/5/2017 Leighton Santacruz, PT Student GP 1          PT G-Codes  Outcome Measure Options: (P) AM-PAC 6  Clicks Basic Mobility (PT)      Leighton Santacruz, PT Student  7/5/2017

## 2017-07-05 NOTE — PROGRESS NOTES
Name: Alfredo Gonzalez ADMIT: 7/3/2017   : 1940  PCP: Gigi Gillespie Jr., MD    MRN: [id number] LOS: 2 days   AGE/SEX: 77 y.o. male  ROOM: [facility]     Active Hospital Problems (** Indicates Principal Problem)    Diagnosis Date Noted   • Portal vein thrombosis [I81] 2017   • Portal vein thrombosis [I81] 2017      Resolved Hospital Problems    Diagnosis Date Noted Date Resolved   No resolved problems to display.     Subjective     77 y.o. male with portal vein thrombosis  Tolerated diet well yesterday  No complaints of abdominal pain.  Feels better than yesterday.     Review of Systems   Constitutional: Negative for activity change, chills, fatigue and fever.   HENT: Negative for ear pain, sneezing and sore throat.    Respiratory: Negative for chest tightness and shortness of breath.    Cardiovascular: Negative for chest pain and leg swelling.   Gastrointestinal: Negative for abdominal pain, blood in stool, diarrhea, nausea and vomiting.   Genitourinary: Negative for flank pain and hematuria.   Musculoskeletal: Negative for joint swelling and myalgias.   Skin: Negative for color change, pallor and wound.   Neurological: Negative for dizziness and seizures.   Hematological: Negative for adenopathy. Does not bruise/bleed easily.   Psychiatric/Behavioral: Negative for confusion and hallucinations.       Objective     Vital Signs  Temp:  [98.2 °F (36.8 °C)-99.7 °F (37.6 °C)] 98.2 °F (36.8 °C)  Heart Rate:  [58-72] 72  Resp:  [14-20] 14  BP: (100-144)/(50-89) 129/67    Physical Exam   Constitutional: He is oriented to person, place, and time. He appears well-developed. No distress.   HENT:   Head: Normocephalic and atraumatic.   Cardiovascular: Normal rate and regular rhythm.    Pulmonary/Chest: Effort normal. No respiratory distress.   Abdominal: Soft. There is no tenderness.   Musculoskeletal: Normal range of motion. He exhibits no tenderness.   Neurological: He is alert and oriented to person,  place, and time.   Skin: Skin is warm and dry.   Psychiatric: He has a normal mood and affect. His behavior is normal.     No leg swelling    Results Review:       I reviewed the patient's new clinical results.    Results from last 7 days  Lab Units 07/05/17 0521 07/04/17 0543 07/03/17  1114   WBC 10*3/mm3 12.25* 9.37 12.17*   HEMOGLOBIN g/dL 13.9 13.4* 13.5*   PLATELETS 10*3/mm3 193 168 170       Results from last 7 days  Lab Units 07/05/17 0521 07/04/17 0543 07/03/17  1114   SODIUM mmol/L 138 134* 139   POTASSIUM mmol/L 4.5 4.8 4.2   CHLORIDE mmol/L 101 96* 99   CO2 mmol/L 23.1 23.7 25.5   BUN mg/dL 24* 31* 42*   CREATININE mg/dL 1.53* 1.44* 1.84*   GLUCOSE mg/dL 151* 286* 195*   Estimated Creatinine Clearance: 41.2 mL/min (by C-G formula based on Cr of 1.53).    Results from last 7 days  Lab Units 07/05/17 0521 07/04/17 0543 07/03/17  1114   CALCIUM mg/dL 8.3* 8.5* 9.4   ALBUMIN g/dL  --  2.90* 3.00*   MAGNESIUM mg/dL  --  1.9  --        Assessment/Plan           Active Hospital Problems (** Indicates Principal Problem)    Diagnosis Date Noted   • Portal vein thrombosis [I81] 07/04/2017   • Portal vein thrombosis [I81] 07/03/2017      Resolved Hospital Problems    Diagnosis Date Noted Date Resolved   No resolved problems to display.        Assessment & Plan  77 y.o. male with portal vein thrombosis and underlying factor v leiden.  Concern is for underlying malignancy causing portal thrombosis. Heme/Onc workup in progresss. From my standpoint he needs therapeutic anticoagulation but no intervention for the portal vein.  Ok to have a general diet and be moved to the floor from my standpoint. Not much to add, will see as needed.         Pastor Trinh MD  07/05/17   12:17 PM  Office Number [phone]

## 2017-07-05 NOTE — PLAN OF CARE
Problem: Patient Care Overview (Adult)  Goal: Plan of Care Review    07/05/17 1150   Coping/Psychosocial Response Interventions   Plan Of Care Reviewed With patient   Outcome Evaluation   Outcome Summary/Follow up Plan Pt presented to ED with abdominal pain that got progressively worse over the course of 2 to 3 days. Pt not safe to ambulate without AD. No LOB noted while ambulating with AD but requires cues to stand upright and closer to RW. Pt displays strength, balance, and functional mobility deficits. Pt will benefit from skilled PT to address these deficits.         Problem: Inpatient Physical Therapy  Goal: Transfer Training Goal 1 LTG- PT    07/05/17 1150   Transfer Training PT LTG   Transfer Training PT LTG, Date Established 07/05/17   Transfer Training PT LTG, Time to Achieve 1 wk   Transfer Training PT LTG, Activity Type all transfers   Transfer Training PT LTG, [place] Level supervision required   Transfer Training PT LTG, Assist Device walker, rolling       Goal: Gait Training Goal LTG- PT    07/05/17 1150   Gait Training PT LTG   Gait Training Goal PT LTG, Date Established 07/05/17   Gait Training Goal PT LTG, Time to Achieve 1 wk   Gait Training Goal PT LTG, [place] Level supervision required   Gait Training Goal PT LTG, Assist Device walker, rolling   Gait Training Goal PT LTG, Distance to Achieve 300       Goal: Stair Training Goal LTG- PT    07/05/17 1150   Stair Training PT LTG   Stair Training Goal PT LTG, Date Established 07/05/17   Stair Training Goal PT LTG, Time to Achieve 1 wk   Stair Training Goal PT LTG, Number of Steps 5   Stair Training Goal PT LTG, [place] Level contact guard assist   Stair Training Goal PT LTG, Assist Device 1 handrail

## 2017-07-05 NOTE — PROGRESS NOTES
No soa, no hypoxia, continued management per medicine and consultants.    Landry Ho MD  [facility]  07/05/17  6:39 PM

## 2017-07-05 NOTE — PROGRESS NOTES
Discharge Planning Assessment  [facility]     Patient Name: Alfredo Gonzalez  MRN: [id number]  Today's Date: 7/5/2017    Admit Date: 7/3/2017          Discharge Needs Assessment       07/05/17 0928    Living Environment    Lives With spouse    Living Arrangements house    Home Accessibility no concerns    Stair Railings at Home other (see comments)   Stairs to basement    Transportation Available car;family or friend will provide    Living Environment    Quality Of Family Relationships supportive    Able to Return to Prior Living Arrangements yes    Discharge Needs Assessment    Concerns To Be Addressed basic needs concerns    Anticipated Changes Related to Illness none    Equipment Currently Used at Home cane, straight   Daughter states he does not use it consistently    Equipment Needed After Discharge none    Discharge Planning Comments Daughter Melissa Cooksey [phone]            Discharge Plan       07/05/17 0929    Case Management/Social Work Plan    Plan Unsure if he will be able to return home.    Patient/Family In Agreement With Plan yes    Additional Comments Spoke with patient (he closed his eyes and went to sleep) and daughter Bouchra at bedside.  Patient lives with wife who was recently dx. with breast cancer and has OR scheduled next week.  He uses a cane occasionially, but not consistent.  He has used HH in the past and has been to acute rehab here at [facility].  Unsure at present if patient will be able to return home at [place] - given list of HH agencies and [facility].  CCP will follow.        Discharge Placement     No information found                Demographic Summary       07/05/17 0926    Referral Information    Admission Type inpatient    Arrived From admitted as an inpatient;home or self-care    Referral Source admission list    Reason For Consult discharge planning    Record Reviewed history and physical    Primary Care Physician Information    Name Dr. Gigi Gillespie             Functional Status       07/05/17 0926    Functional Status Current    Ambulation 2-->assistive person    Transferring 2-->assistive person    Toileting 2-->assistive person    Bathing 2-->assistive person    Dressing 2-->assistive person    Eating 0-->independent    Communication 0-->understands/communicates without difficulty    Change in Functional Status Since Onset of Current Illness/Injury no    Functional Status Prior    Ambulation 0-->independent    Transferring 0-->independent    Toileting 0-->independent    Bathing 0-->independent    Dressing 0-->independent    Eating 0-->independent    Communication 0-->understands/communicates without difficulty    IADL    Medications independent    Meal Preparation assistive person    Housekeeping assistive person    Laundry assistive person    Shopping assistive person    Oral Care independent    Activity Tolerance    Current Activity Limitations none    Usual Activity Tolerance fair    Current Activity Tolerance fair    Cognitive/Perceptual/Developmental    Current Mental Status/Cognitive Functioning no deficits noted    Recent Changes in Mental Status/Cognitive Functioning no changes   Daughter states he has been a little confused which is not normal for him.            Psychosocial     None            Abuse/Neglect     None            Legal     None            Substance Abuse     None            Patient Forms     None          Becky S. [place]eni[place], RN

## 2017-07-05 NOTE — PROGRESS NOTES
Subjective .  Modest improvement in fatigue , worsening foot pain persisting    REASONS FOR FOLLOWUP:    1. Previous assessment per superior sagittal sinus thrombosis, left frontal intracerebral hemorrhage, acute calf vein thrombosis, hypercoagulable assessment positive for heterozygosity for factor V 5 Leiden gene mutation.   2. Ongoing anticoagulation with Coumadin followed by Dr. Gillespie.   3. Admission on 08/04/2014 with severe right knee pain, spontaneous infusion, calf vein thrombosis despite therapeutic anticoagulation.   4. Repeat consultation 08/05/2014 leading to chest CT revealing irregula r nodule in the right apex indeterminate ? malignancy.   5. Thoracic surgery consultation, Neurovascular surgery consultation obtained, IVC filter placed, exploratory right video assisted fluoroscopy with wedge resection right upper lobe lymphadenectomy 08/15/ 2014, pathology consistent with primary lung adenocarcinoma, stage [id number] stage IA, EGFR mutation, positive for Exon 19 mutation, (potentially responsive to EGFR tyrosine kinase therapy).   6. The patient was seen in the office 09/23/2014, adjuvant therapy not felt necessary, IVC filter to be removed, continue Xarelto therapy thereafter recommended  7. Patient reviewed December 21, 2016 with ongoing anemia-microcytic, hypochromic  8. Determination of considerable iron deficiency and B12 deficiency?  Pernicious anemia, replacement therapy is initiated  9. Patient reviewed March 8, considerable improvement per anemia though iron deficiency persists, patient with progressive foot pain, foot lesions developing, follow-up scan obtained  10. Patient reviewed April 3, CT scans negative, anemia resolved, potential lower extremity vasculopathy-dermatologic assessment pursued  11. Patient reviewed May 31, 2017, no significant improvement per lower extremity vasculopathy    HISTORY OF PRESENT ILLNESS:  The patient is a 77 y.o. year old male.  This patient was admitted to  the hospital intensive care unit yesterday with a at least 3 days history of persistent abdominal pain and 67/10 in the periumbilical area and epigastric area continues associated with mild nausea refused they have 3 N1 of dictation.  He has no abdominal distention no fevers no chills no diaphoresis and no retrosternal chest pain.  The time of admission through the emergency room he was found to have significant abnormalities in liver function tests filter assessment documented to a CT scan of the abdomen to the patient had extensive portal vein thrombosis.  He was also noticed that the patient splenomegaly.  Thrombosis of the splenic vein.  Obviously the patient was admitted to the intensive care unit he was placed on heparin and actually his abdominal pain has substantially subsided since then to the point of her solution and this is starting to have a and regular diet today.  The patient states today that the abdominal pain is no new has been ongoing on for at least 2to3 months Mild and intensity progressive to the point that he got yesterday.  Also he has lost his appetite take completely and he has lost 20 pounds of weight in the last 2-3 months.  Again he has not had any fever or chills he doesn't have any cough or sputum production he has not had any shortness of breath.  He denies any changes in his bowel habits.  He denies any hematemesis or melena or interim range up.  He denies any hematuria.  He hasn't had any frequency to urinate and he denies any recent urinary tract infections.  Ibuprofen skin lesions that were documented before were treated by his primary internal medicine physician with resolution.  No new lesions evolving.    History of Past  Illness     Mr. Gonzalez is a 77-year-old male who we had initially seen in consultation 02/04 through 02/14/2002 having presented at that point with a superior sagittal sinus thrombosis and left frontal intracerebral hemorrhage. Evaluation thereafter included a  hypercoagulable assessment when he was found also to have an acute calf vein thrombosis and spontaneous calf vein thrombosis on the left . The patient fortunately responded well to rehabilitation and anticoagulation. He was eventually placed on Coumadin long term and hypercoagulable assessment had revealed findings consistent with heterozygosity for factor V Leiden gene mutation. The patie nt did not have followup with us, though that is not exactly certain why.       Mr. Gonzalez was later admitted 08/04/2014 through Dr. Gillespie. The patient had gone to [employer] and while standing in [employer] he noticed his right leg starting to cramp. He began to have noticeable limping thereafter and then when knee pain became so severe his wife called 911 and he was brought to [facility] emergency room. In the ER he was found to have calf vein thrombosis involving the right leg as well as a swollen k n ee with effusion. He was admitted to be seen by Orthopedics and was found to incidentally have a prothrombin time 3.1 and thus we were asked to see him for his thrombosis despite adequate anticoagulation. At that point, he was seen by this physician and a s a result of there being concern about additional factor such as a malignancy, a CT chest, abdomen and pelvis was obtained 08/05/2014. This revealed unexpectedly an irregular 2.0 x 1.1 cm lesion in the right apical region. This was concerning for a malig n jorge. He also had non-obstructing left renal calculus and extensive lumbar spinal degenerative disease. The patient was seen by Orthopedics again with a knee tap. (Followup is still remaining concerning this). The patient was also seen by Thoracic surgeangely joyner . Please note that Dopplers 08/06/2014 revealed a subacute venous thrombosis in the right popliteal vein as well as subacute thrombus in the right calf vein. The patient was seen by Dr. Meyer per Thoracic surgery and a PET scan was obtained showing the no d ule in  right lung apex to demonstrate low level activity somewhat indeterminate with an SUV of 2.0. No other abnormalities were present. It was felt that the patient should proceed to surgical resection. An IVC filter was placed prior to surgery 08/13/201 4 by Dr. Pastor Trinh. PFTs revealed a moderate airway restriction and after discussion on 08/15/2014 the patient underwent exploratory bronchoscopy, exploratory right video assisted thoracoscopy with wedge resection, right upper lobe and lymphadenectom y . The patient remains hospitalized at this point to have the surgery rather than return for subsequent surgery. Pathology per surgical specimen revealed a primary lung adenocarcinoma, demonstrating a lepidic growth pattern. R4, station 7 and R10 nodes wer e all negative for disease. The tumor size was 2.1 x 1.3 x 1.2 unifocal adenocarcinoma. His pathologic staging was a pT1b pN0M0- stage IA. Now available also at the time of this dictation is the finding that the tumor was EGFR mutation positive with a le s ion positive for [id number] mutation, Exon 19. These are reported to correlate with responsiveness to EGFR tyrosine kinase inhibitor therapies. The patient was seen by Dr. Meyer postoperative and is doing well and is also being seen by Dr. Trinh with consideration of removal of his IVC filter.       The patient now presents back to our practice for review of all of this. We discussed his time line up to this point. In a long discussion it is felt that adjuvant chemotherapy is not warranted in Mr. Gonzalez's care. Reasonably followup is however likely with at least a chest x-ray on a yearly basis possibly through Dr. Gillespie.       The patient's had follow-up through Dr. CHRISTIAN Walker since his last visit here as well as reviews to Dr. Meyer.  This includes evidently a trial of oral iron when the patient was found to be anemic previously as well as recognition of the patient's kidney function-CKD 3.  She also was seen in  emergency room in June of this year after falling from a ladder with laboratory studies revealing anemia with hemoglobin 8.5, hematocrit 30.5 and MCV that Dr. 63.3 with MCH of 17.6 and an MCHC of 27.9, platelet count of 333,000 with normal differential.  In reviewing the patient's blood counts he is definitely developed microcytosis and hypochromia since June and, as such, when immediately as concerned about iron deficiency as an underlying issue.  He also has chronic leukocytosis that appears to be unchanged from previous.  We discussed IV iron as a treatment to address the immediate issue and thereafter try to determine the source of blood loss if indeed iron deficiency is documented.     The patient went on to be treated with both IV Feraheme ×2 on December 23 and December 30 as well as be given injections of B12 both visits.  As he seen back today January 30 he is feeling some better and his wife has also noticed a difference.  We plan to continue the B12 loading over the next several weeks as well as assessing him for pernicious anemia laboratories today.  The patient is now seen back March 08, 2017.  While he's had some improvement in his energy level he still has pain in his feet which has progressed associated with a mildly raised, violaceous group of lesions involving his calf and left foot have developed.  He indicates he is to see a podiatrist in the near future and with the findings of pernicious anemia, ongoing iron deficiency, and unexplained rash developing in the setting of a history of non-small cell lung cancer I requested a PET/CT-?  Paraneoplasia or even new malignant process.    The patient initially was to undergo a PET/CT but this was declined.  A CT of chest and pelvis was obtained with no evidence of metastatic disease identified.  The patient hematologically went on to further improve but has, unfortunately, considerable pain in his lower extremities associated with a rash that has a  partially vasculitic appearance.  The areas in question are erythematous, serpiginous, mildly raised, and very tender to palpation.  He's been seen by podiatry and treated topically without effect.   The patient was thereafter seen evidently by both dermatology and vascular surgery.  Unfortunately he still has significant pain though this is managed in part by current use of pain medications with modest use thus far.  He is encouraged to use this more frequently and plans are to contact Dr. CHRISTIAN Walker as well.    Past Medical History:   Diagnosis Date   • Arthralgia    • Arthritis    • Back pain    • Chronic mixed headache syndrome    • Deep venous thrombosis of right popliteal vein     of right knee hemarthrosis   • Diabetes mellitus    • Diverticulosis    • DJD (degenerative joint disease)    • Factor V Leiden    • GERD (gastroesophageal reflux disease)    • Hypercholesterolemia    • Hypertension    • Intracerebral hemorrhage    • Lumbar spinal stenosis    • Muscular aches    • Neck pain    • Non-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   • Thrombosis     Lower extremity       ONCOLOGIC HISTORY:  (History from previous dates can be found in the separate document.)    No current facility-administered medications on file prior to encounter.      Current Outpatient Prescriptions on File Prior to Encounter   Medication Sig Dispense Refill   • Calcium Carbonate-Vitamin D (CALCIUM-D) 600-400 MG-UNIT tablet Take 1 tablet by mouth daily.     • carvedilol (COREG) 12.5 MG tablet      • cilostazol (PLETAL) 50 MG tablet      • clotrimazole-betamethasone (LOTRISONE) 1-0.05 % cream      • HYDROcodone-acetaminophen (NORCO) 5-325 MG per tablet Take 1 tablet by mouth every 6 (six) hours as needed.     • Insulin Syringe-Needle U-100 (RELION INSULIN SYR  "0.5ML/31G) 31G X 5/16\" 0.5 ML misc      • irbesartan-hydrochlorothiazide (AVALIDE) 300-12.5 MG tablet Take 1 tablet by mouth daily.     • pravastatin (PRAVACHOL) 80 MG tablet Take  by mouth daily.     • rivaroxaban (XARELTO) tablet Take 20 mg by mouth daily with dinner.     • vitamin B-6 (PYRIDOXINE) 100 MG tablet Take 100 mg by mouth.         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     Social History Main Topics   • Smoking status: Former Smoker     Packs/day: 1.00     Years: 20.00     Quit date: 2/8/1996   • Smokeless tobacco: Not on file   • Alcohol use No   • Drug use: No   • Sexual activity: No     Other Topics Concern   • Not on file     Social History Narrative         Cancer-related family history includes Colon cancer in his mother; Lung cancer (age of onset: 77) in his brother.     Review of Systems    General: no fever, chills,significant fatigue,20 lb loss weight changes, and lack of appetite.  Eyes: no epiphora, xerophthalmia,conjunctivitis, pain, glaucoma, blurred vision, blindness, secretion, photophobia, proptosis, diplopia.  Ears: no otorrhea, tinnitus, otorrhagia, deafness, pain, vertigo.  Nose: no rhinorrhea, epistaxis, alteration in perception of odors, sinuses pressure.  Mouth: no alteration in gums or teeth,  ulcers, no difficulty with mastication or deglut ion, no odynophagia.  Neck: no masses or pain, no thyroid alterations, no pain in muscles or arteries, no carotid odynia, no crepitation.  Respiratory: no cough, sputum production, dyspnea, trepopnea, pleuritic pain, hemoptysis.  Heart: no syncope, irregularity, palpitations, angina, orthopnea, paroxysmal nocturnal dyspnea.  Vascular Venous: no " tenderness,edema, palpable cords, postphlebitic syndrome, skin changes or ulcerations.  Vascular Arterial: no distal ischemia, claudication, gangrene, neuropathic ischemic pain, skin ulcers, paleness or cyanosis.  GI: no dysphagia, odynophagia, no regurgitation, no heartburn,no indigestion,no nausea,no vomiting,no hematemesis ,no melena,no jaundice,no distention, no obstipation,no enterorrhagia,no proctalgia,no anal  lesions, nochanges in bowel habits. Abdominal pain as stated  : no frequency, hesitancy, hematuria, discharge, pain.  Musculoskeletal: no muscle or tendon pain or inflammation, joint pain, edema, functional limitation, fasciculations, mass.  Neurologic: no headache, seizures, alterations on Craneal nerves, no motor or senssory deficit, normal coordination, no alteration in memory, orientation, calculation,writting, verbal or written language.  Skin: no rashes, pruritus or localized lesions.  Psychiatric: no anxiety, depression, agitation, delusions, proper insight.        A comprehensive 14 point review of systems was performed and was negative except as mentioned.    Objective      Vitals:    07/05/17 0036 07/05/17 0737 07/05/17 0934 07/05/17 0938   BP: 110/70 142/68  129/67   BP Location: Left arm Right arm     Patient Position: Lying Lying     Pulse: 68  72    Resp: 20 14     Temp: 99.7 °F (37.6 °C) 98.2 °F (36.8 °C)     TempSrc: Oral Oral     SpO2:  94% 93%    Weight:       Height:         Current Status 5/31/2017   ECOG score 1       Physical Exam    GENERAL: Well-developed, well-nourished male in no acute distress.   SKIN: Warm, dry,healed lesions in le  HEAD: Normocephalic.   EYES: Pupils equal, round and reactive to light. EOMs intact. Conjunctivae normal.   EARS: Hearing intact.   NOSE: Septum midline. No excoriations or nasal discharge.   MOUTH: Tongue is well papillated; no stomatitis or ulcers. Lips normal.   THROAT: Oropharynx without lesions or exudates.   NECK: Supple with good range of  motion; no thyromegaly or masses, no JVD or bruits.   LYMPHATICS: No cervical, supraclavicular, axillary or inguinal adenopathy.   CHEST: Decreased breath sounds bilateral bases.   CARDIAC: Status post right video assisted thoracoscopy with healed incision sites.   ABDOMEN: Soft, nontender with no organomegaly or masses. No abdominal pain, palpable spleen 3 cm blcm  EXTREMITIES: Without palpable cords or positive Lucius's signs No clubbing, cyanosis or edema.   NEURO: No focal neurological deficits.         RECENT LABS:CT ABDOMEN AND PELVIS WITH IV CONTRAST      HISTORY: 77-year-old male with abnormal appearance of the portal vein on  a noncontrasted CT of the abdomen and pelvis performed earlier today.      TECHNIQUE: 3 mm images were obtained through the abdomen and pelvis  after the administration of IV contrast. Compared with the noncontrasted  CT abdomen and pelvis performed earlier today.      FINDINGS: There is extensive acute thrombosis of the main portal vein,  right and left portal veins, and nearly the entire splenic vein. The SMV  is patent. The spleen is enlarged and there are 3 small wedge-shaped  regions of decreased enhancement. There is a heterogeneous enhancement  pattern throughout the central aspect of the liver. There is a tiny  amount of perihepatic and perisplenic ascites and there is a small  amount of free fluid within the dependent aspect of the pelvis. No acute  abnormality is seen involving the pancreas, adrenals, or kidneys. Single  nonobstructing left renal stone is noted. No acute bowel abnormality is  seen. Prostatic brachytherapy seeds again noted.      IMPRESSION:  1. Extensive acute thrombosis of the portal vein, right and left  intrahepatic portal veins, and there is extensive acute thrombosis of  nearly the entire splenic vein. The SMV is patent.  2. Development of splenomegaly and there are 3 small evolving splenic  infarctions. Very small volume of free fluid within the abdomen  and  pelvis.      Discussed with Dr. Siddiqui.      This report was finalized on 7/3/2017 4:27 PM by Dr. 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  He has had extensive thrombosis in the failure in lower extremities he has had sagittal vein thrombosis he has had a chronic anticoagulation initiated with Coumadin and subsequently with a Xarelto.  The patient has been taking this Xarelto religiously according to the medical records are documented in the emergency room.  However submitted most is the fact that the patient has had abdominal pain subsided significantly for the last 2-3 months he has lost his appetite and he has lost 20 pounds in weight.  They and the patient has had in March of this year reassessment of previous malignancy specifically lung cancer having no evidence for recurrence.  On the thrombosis of the portal vein is an entity that can be associated with several malignancies in the gastrointestinal tract including pancreatic cancer as well as liver cancer.  The patient has developed a significant anemia related to iron deficiency and he was treated subsequently for these by  this incident the summer of 2016.  Therefore medically to 1 that he will be a seen is normally they thrombophilia associated with  Leiden mutation and also thrombophilia associated with some sort of malignancy.  Even though the radiological assessment of his CT scan shows nothing to suggest a hepatocellular carcinoma or a  pancreatic cancer and makes it under reasonably to proceed with tumor markers including a CA 19-9 as well as CEA and also the alpha-fetoprotein.    Given the history of prostate cancer and going to go ahead and proceed per PSA.  I'm going to repeat his thrombophilia labs including lupus anticoagulant and anticardiolipin antibody profile along with a C-reactive protein.  Thrombophilia CAN HAPPEN IN patient WITH Monoclonal protein.  For this reason I'm going to proceed with a serum protein ELECTROPHORESIS.  From the point of your treatment the patient is going to require continuation of the brain and eventually initiation of transition with Coumadin.  Coumadin will require to be follow-up in the office in the future down going to go ahead and make appointments to follow-up with Dr. JHAVERI the close future for pro times and so forth.    Eventually we'll continue related to repeat the CT scan of the abdomen to see if there resolution of the portal vein thrombosis and splenic vein thromboses and to see what happens with his enlarged spleen that is a congestive form of splenomegaly.  Finally the patient is stated that has not been lOng since he had a  colonoscopy.  Again the fact that he developed IRON deficiency anemia on blood thinner medication makes it to WONDER FOR source of  malignancy the gastrointestinal tract.    Discussed with dr LEMOS: looking for another reason of thrombophilia CEA and PSA normal, AFP and ca 19-9 pending, other labs pending; we agree into endoscopies at this point he has hemo positive stools, and on anticoagulants we need to know the source of bleeding.He will consult GI. Discussed with pt wife and daughter in detail.

## 2017-07-05 NOTE — PLAN OF CARE
Problem: Patient Care Overview (Adult)  Goal: Plan of Care Review  Outcome: Ongoing (interventions implemented as appropriate)    07/04/17 1924 07/05/17 1150 07/05/17 1640   Coping/Psychosocial Response Interventions   Plan Of Care Reviewed With --  patient --    Patient Care Overview   Progress no change --  --    Outcome Evaluation   Outcome Summary/Follow up Plan --  --  Pt PTT is therapeutic am PTT ordered. GI consult today for heme positive stool. vascular sugery signed off. no complaints from patient today. oriented with periods of confusion.          Problem: Skin Integrity Impairment, Risk/Actual (Adult)  Goal: Identify Related Risk Factors and Signs and Symptoms  Outcome: Ongoing (interventions implemented as appropriate)  Goal: Skin Integrity/Wound Healing  Outcome: Ongoing (interventions implemented as appropriate)    Problem: Fall Risk (Adult)  Goal: Identify Related Risk Factors and Signs and Symptoms  Outcome: Ongoing (interventions implemented as appropriate)  Goal: Absence of Falls  Outcome: Ongoing (interventions implemented as appropriate)    Problem: Diabetes, Type 2 (Adult)  Goal: Signs and Symptoms of Listed Potential Problems Will be Absent or Manageable (Diabetes, Type 2)  Outcome: Ongoing (interventions implemented as appropriate)

## 2017-07-05 NOTE — PROGRESS NOTES
History:    This is a very nice 77-year-old gentleman.  He has a history of thrombophilia.  He has factor V Leiden.  He was admitted and diagnosed with portal vein thrombosis.  He has a history of past adenocarcinoma of the lung and prostate cancer.  At the time of admission the patient was on Xarelto and Plavix.  He is now on heparin.    Last night the patient was confused.  He pulled out his IV.  Today he is a little lethargic but oriented.  He has no complaints.      Allergies  Review of patient's allergies indicates no known allergies.      Current Facility-Administered Medications:   •  dextrose (D50W) solution 25 g, 25 g, Intravenous, Q15 Min PRN, Landry Ho MD  •  dextrose (D50W) solution 25 g, 25 g, Intravenous, Q15 Min PRN, Ceferino Gillespie MD  •  dextrose (GLUTOSE) oral gel 15 g, 15 g, Oral, Q15 Min PRN, Landry Ho MD  •  dextrose (GLUTOSE) oral gel 15 g, 15 g, Oral, Q15 Min PRN, Ceferino Gillespie MD  •  donepezil (ARICEPT) tablet 5 mg, 5 mg, Oral, Nightly, Ceferino Gillespie MD, 5 mg at 07/04/17 2121  •  glucagon (human recombinant) (GLUCAGEN DIAGNOSTIC) injection 1 mg, 1 mg, Subcutaneous, Q15 Min PRN, Landry Ho MD  •  glucagon (human recombinant) (GLUCAGEN DIAGNOSTIC) injection 1 mg, 1 mg, Subcutaneous, Q15 Min PRN, Ceferino Gillespie MD  •  heparin (porcine) 5000 UNIT/ML injection 2,900-5,900 Units, 40-80 Units/kg, Intravenous, Q6H PRN, Sher Siddiqui MD  •  heparin 54963 units/250 mL (100 units/mL) in 0.45 % NaCl infusion, 18 Units/kg/hr, Intravenous, Titrated, Sher Siddiqui MD, Last Rate: 11.02 mL/hr at 07/04/17 1536, 15 Units/kg/hr at 07/04/17 1536  •  HYDROcodone-acetaminophen (NORCO) 5-325 MG per tablet 1 tablet, 1 tablet, Oral, Q4H PRN, Ceferino Gillespie MD, 1 tablet at 07/04/17 0130  •  insulin aspart (novoLOG) injection 0-9 Units, 0-9 Units, Subcutaneous, 4x Daily With Meals & Nightly, Ceferino Gillespie MD, 6 Units at 07/04/17 2121  •  insulin detemir (LEVEMIR) injection 25 Units, 25  "Units, Subcutaneous, Nightly, Ceferino Gillespie MD, 25 Units at 07/04/17 2121  •  levETIRAcetam (KEPPRA) tablet 500 mg, 500 mg, Oral, BID, Ceferino Gillespie MD, 500 mg at 07/04/17 1732  •  morphine injection 1 mg, 1 mg, Intravenous, Q4H PRN **AND** naloxone (NARCAN) injection 0.4 mg, 0.4 mg, Intravenous, Q5 Min PRN, Ceferino Gillespie MD  •  oxybutynin (DITROPAN) tablet 5 mg, 5 mg, Oral, Daily, Ceferino Gillespie MD, 5 mg at 07/04/17 0841  •  pantoprazole (PROTONIX) EC tablet 40 mg, 40 mg, Oral, Q AM, Ceferino Gillespie MD, 40 mg at 07/05/17 0608  •  sodium chloride 0.45 % with KCl 20 mEq/L infusion, 100 mL/hr, Intravenous, Continuous, Ceferino Gillespie MD, Last Rate: 100 mL/hr at 07/04/17 2311, 100 mL/hr at 07/04/17 2311  •  sodium chloride 0.9 % flush 1-10 mL, 1-10 mL, Intravenous, PRN, Ceferino Gillespie MD  •  sodium chloride 0.9 % flush 10 mL, 10 mL, Intravenous, PRN, Sher Siddiqui MD  •  traZODone (DESYREL) tablet 50 mg, 50 mg, Oral, Nightly, Ceferino Gillespie MD, 50 mg at 07/04/17 2121  •  valsartan (DIOVAN) tablet 160 mg, 160 mg, Oral, Q24H, Ceferino Gillespie MD, 160 mg at 07/04/17 0842    /70 (BP Location: Left arm, Patient Position: Lying)  Pulse 68  Temp 99.7 °F (37.6 °C) (Oral)   Resp 20  Ht 69\" (175.3 cm)  Wt 158 lb 12.8 oz (72 kg)  SpO2 95%  BMI 23.45 kg/m2    Physical Exam     Appearance: Normally developed and nourished 77-year-old gentleman.  He is in no distress.     HEENT: Normocephalic atraumatic.  Pupils round and equal.  Pharynx clear and mucous murmurs moist.     Neck: Without adenopathy JVD or thyromegaly.     Lungs: Clear to auscultation percussion.     Heart: Regular rate and rhythm.     Abdomen: Normal active bowel sounds.  No rebound test or guarding.  Spleen was palpable.     Extremities: No calf tenderness.  No clubbing, cyanosis or edema.     Neurologic: Nonfocal      Lab Results (last 24 hours)     Procedure Component Value Units Date/Time    aPTT [[id number]]  (Abnormal) Collected:  07/04/17 0727    Specimen: "  Blood Updated:  07/04/17 0825     PTT 58.8 (H) seconds     POC Glucose Fingerstick [[id number]]  (Abnormal) Collected:  07/04/17 1116    Specimen:  Blood Updated:  07/04/17 1119     Glucose 298 (H) mg/dL     Narrative:       Meter: [id number] : [id number] Sinai Muhammad    JANELLE,PE and [facility], Serum [[id number]] Collected:  07/04/17 1123    Specimen:  Blood Updated:  07/04/17 1124    Lupus Anticoag / Cardiolipin Ab [[id number]] Collected:  07/04/17 1124    Specimen:  Blood Updated:  07/04/17 1124    Cancer Antigen 19-9 [[id number]] Collected:  07/04/17 1124    Specimen:  Blood Updated:  07/04/17 1124    AFP Tumor Marker [[id number]] Collected:  07/04/17 1124    Specimen:  Blood Updated:  07/04/17 1124    Cardiolipin Antibody, IgA [[id number]] Collected:  07/04/17 1124    Specimen:  Blood Updated:  07/04/17 1124    aPTT [[id number]]  (Abnormal) Collected:  07/04/17 1124    Specimen:  Blood from Arm, Right Updated:  07/04/17 1144     PTT 80.1 (H) seconds     C-reactive Protein [[id number]]  (Abnormal) Collected:  07/04/17 1124    Specimen:  Blood Updated:  07/04/17 1155     C-Reactive Protein 10.73 (H) mg/dL     Iron Profile [[id number]]  (Abnormal) Collected:  07/04/17 1124    Specimen:  Blood Updated:  07/04/17 1155     Iron 13 (L) mcg/dL      Iron Saturation 6 (L) %      Transferrin 139 (L) mg/dL      TIBC 207 mcg/dL     Ferritin [[id number]]  (Normal) Collected:  07/04/17 1124    Specimen:  Blood Updated:  07/04/17 1204     Ferritin 228.30 ng/mL     PSA [[id number]]  (Normal) Collected:  07/04/17 1124    Specimen:  Blood Updated:  07/04/17 1210     PSA <0.014 ng/mL     CEA [[id number]] Collected:  07/04/17 1124    Specimen:  Blood Updated:  07/04/17 1214     CEA 1.34 ng/mL     Narrative:       CEA Reference Ranges:    Non Smokers:   Less than 3 ng/mL  Smokers:       Less than 5 ng/mL    Vitamin B12 [[id number]]  (Abnormal) Collected:  07/04/17 1124    Specimen:  Blood Updated:  07/04/17 1214     Vitamin B-12 >2000 (H) pg/mL      POC Glucose Fingerstick [[id number]]  (Abnormal) Collected:  07/04/17 1620    Specimen:  Blood Updated:  07/04/17 1622     Glucose 276 (H) mg/dL     Narrative:       Meter: [id number] : [id number] Sinai Muhammad    POC Glucose Fingerstick [[id number]]  (Abnormal) Collected:  07/04/17 2040    Specimen:  Blood Updated:  07/04/17 2043     Glucose 274 (H) mg/dL     Narrative:       Meter: [id number] : [id number] Herb Ardmore    Basic Metabolic Panel [[id number]]  (Abnormal) Collected:  07/05/17 0521    Specimen:  Blood Updated:  07/05/17 0606     Glucose 151 (H) mg/dL      BUN 24 (H) mg/dL      Creatinine 1.53 (H) mg/dL      Sodium 138 mmol/L      Potassium 4.5 mmol/L      Chloride 101 mmol/L      CO2 23.1 mmol/L      Calcium 8.3 (L) mg/dL      eGFR Non African Amer 44 (L) mL/min/1.73      BUN/Creatinine Ratio 15.7     Anion Gap 13.9 mmol/L     Narrative:       The MDRD GFR formula is only valid for adults with stable renal function between ages 18 and 70.    CBC & Differential [[id number]] Collected:  07/05/17 0521    Specimen:  Blood Updated:  07/05/17 0633    Narrative:       The following orders were created for panel order CBC & Differential.  Procedure                               Abnormality         Status                     ---------                               -----------         ------                     Scan Slide[[id number]]                                                                  CBC Auto Differential[[id number]]        Abnormal            Final result                 Please view results for these tests on the individual orders.    CBC Auto Differential [[id number]]  (Abnormal) Collected:  07/05/17 0521    Specimen:  Blood Updated:  07/05/17 0633     WBC 12.25 (H) 10*3/mm3      RBC 5.31 10*6/mm3      Hemoglobin 13.9 g/dL      Hematocrit 43.4 %      MCV 81.7 fL      MCH 26.2 (L) pg      MCHC 32.0 (L) g/dL      RDW 15.5 (H) %      RDW-SD 46.4 fl      Platelets 193 10*3/mm3      Neutrophil %  76.5 (H) %      Lymphocyte % 9.9 (L) %      Monocyte % 9.6 %      Eosinophil % 2.2 %      Basophil % 0.7 %      Immature Grans % 1.1 (H) %      Neutrophils, Absolute 9.37 (H) 10*3/mm3      Lymphocytes, Absolute 1.21 10*3/mm3      Monocytes, Absolute 1.18 10*3/mm3      Eosinophils, Absolute 0.27 10*3/mm3      Basophils, Absolute 0.08 10*3/mm3      Immature Grans, Absolute 0.14 (H) 10*3/mm3      nRBC 0.0 /100 WBC           Imp:    1.  Thrombophilia with portal vein thrombosis.  The patient has factor V Leiden.  He has a history of both adenocarcinoma of the lung and prostate cancer.  Underlying malignancy is a definite possibility.     2.  Type 2 diabetes mellitus.  Blood sugars are high.     3.  Essential hypertension.     4.  Hyperlipidemia.     5.  History of and no CVA of the lung and prostate cancer.     6.  Stool heme positive on admission.  He has evidence of iron deficiency.     7.  Chronic renal insufficiency.  Stage III B      Plan:    Thrombophilia workup is in progress.    Ultimately the patient will need to be started on Coumadin.    He needs panendoscopy.  I'm not sure if this should be done during this hospitalization we should consider this in the future.  If we're going to hold off on panendoscopy we should start Coumadin now.    I will increase his long-acting insulin.    I will have physical therapy work with the patient    Ceferino Gillespie MD  7/5/2017  7:30 AM

## 2017-07-05 NOTE — CONSULTS
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 Pts history is only fair due to mild dementia but family is there to help, s/w. His complince with Xarelto is assumed but his appetite is off enough for him to have lost wieght. With that a w/u for underlying malignancy to increase his risk of thrombosis has been undertaken with CT and tumor markers all of which at this time are normal/Neg. His last colonoscopy was for rectal bleeding in October of 2015 with two 3-6mm polyps and hemorrhoids. So dont feel that his colon is a reasonable source but he has never had an EGD and with his upper GI symptoms feel an endoscopy would be beneficial to R/O gastric Cancer as well as Angiodysplasia that could be contributing to his FINN. This however has been easily treated and he hasnt received venofer for some time per his wife and he does not take oral iron.      Past Medical History:   Diagnosis Date   • Arthralgia    • Arthritis    • Back pain    • Chronic mixed headache syndrome    • Deep venous thrombosis of right popliteal vein     of right knee hemarthrosis   • Diabetes mellitus    • Diverticulosis    • DJD (degenerative joint disease)    • Factor V Leiden    • GERD (gastroesophageal reflux disease)    • Hypercholesterolemia    • Hypertension    • Intracerebral hemorrhage    • Lumbar spinal stenosis    • Muscular aches    • Neck pain    • Non-small cell carcinoma of lung     Stage IA, EGFR mutated   • Obesity    • Peripheral neuropathy    • Prostate  "cancer     Status post seed implant for radiation therapy   • Shortness of breath    • Stroke     H/O CVA in 2012.   • Thrombosis     Lower extremity     Past Surgical History:   Procedure Laterality Date   • BRAIN SURGERY     • CHOLECYSTECTOMY     • HERNIA REPAIR      Bilateral inguinal hernia and umbilical hernia repair   • LUNG REMOVAL, PARTIAL  08/2014   • PROSTATE SURGERY     • VENA CAVA FILTER PLACEMENT       Family History   Problem Relation Age of Onset   • Colon cancer Mother    • Stroke Father    • Lung cancer Brother 77   • Factor V Leiden deficiency Brother      Social History   Substance Use Topics   • Smoking status: Former Smoker     Packs/day: 1.00     Years: 20.00     Quit date: 2/8/1996   • Smokeless tobacco: None   • Alcohol use No     Prescriptions Prior to Admission   Medication Sig Dispense Refill Last Dose   • Calcium Carbonate-Vitamin D (CALCIUM-D) 600-400 MG-UNIT tablet Take 1 tablet by mouth daily.   7/2/2017 at Unknown time   • carvedilol (COREG) 12.5 MG tablet    7/3/2017 at 0900   • cilostazol (PLETAL) 50 MG tablet    7/2/2017 at 2300   • clotrimazole-betamethasone (LOTRISONE) 1-0.05 % cream    Past Month at Unknown time   • HYDROcodone-acetaminophen (NORCO) 5-325 MG per tablet Take 1 tablet by mouth every 6 (six) hours as needed.   Past Week at Unknown time   • Insulin Syringe-Needle U-100 (RELION INSULIN SYR 0.5ML/31G) 31G X 5/16\" 0.5 ML misc    7/3/2017 at Unknown time   • irbesartan-hydrochlorothiazide (AVALIDE) 300-12.5 MG tablet Take 1 tablet by mouth daily.   7/2/2017 at Unknown time   • pravastatin (PRAVACHOL) 80 MG tablet Take  by mouth daily.   7/2/2017 at Unknown time   • rivaroxaban (XARELTO) tablet Take 20 mg by mouth daily with dinner.   7/2/2017 at Unknown time   • vitamin B-6 (PYRIDOXINE) 100 MG tablet Take 100 mg by mouth.   7/3/2017 at Unknown time     Allergies:  Review of patient's allergies indicates no known allergies.    REVIEW OF SYSTEMS:  Please see the above " "history of present illness for pertinent positives and negatives.  The remainder of the patient's systems have been reviewed and are negative.     Vital Signs  Temp:  [98.2 °F (36.8 °C)-99.7 °F (37.6 °C)] 98.6 °F (37 °C)  Heart Rate:  [65-72] 66  Resp:  [14-20] 14  BP: (110-144)/(67-91) 130/91    Flowsheet Rows         First Filed Value    Admission Height  69\" (175.3 cm) Documented at 07/03/2017 1053    Admission Weight  162 lb (73.5 kg) Documented at 07/03/2017 1053           Physical Exam:  Physical Exam   Constitutional: Patient appears well-developed and well-nourished and in no acute distress   HEENT:   Head: Normocephalic and atraumatic.   Eyes:  Pupils are equal, round, and reactive to light. EOM are intact. Sclera are anicteric and non-injected.  Mouth and Throat: Patient has moist mucous membranes. Oropharynx is clear of any erythema or exudate.     Neck: Neck supple. No JVD present. No thyromegaly present. No lymphadenopathy present.  Cardiovascular: Regular rate, regular rhythm, S1 normal and S2 normal.  Exam reveals no gallop and no friction rub.  No murmur heard.  Pulmonary/Chest: Lungs are clear to auscultation bilaterally. No respiratory distress. No wheezes. No rhonchi. No rales.   Abdominal: Soft. Bowel sounds are normal. No distension and no mass. There is no hepatosplenomegaly. There is no tenderness. No audible Bruit.  Musculoskeletal: Normal Muscle tone  Extremities: No edema. Pulses are palpable in all 4 extremities.  Neurological: Patient is alert and oriented to person, place, and time. Cranial nerves II-XII are grossly intact with no focal deficits.  Skin: Skin is warm. No rash noted. Nails show no clubbing.  No cyanosis or erythema.     Results Review:    I reviewed the patient's new clinical results.  Lab Results (most recent)     Procedure Component Value Units Date/Time    CBC & Differential [[id number]] Collected:  07/03/17 1114    Specimen:  Blood Updated:  07/03/17 1845    Narrative:   "     The following orders were created for panel order CBC & Differential.  Procedure                               Abnormality         Status                     ---------                               -----------         ------                     Scan Slide[[id number]]                                                                   CBC Auto Differential[[id number]]         Abnormal            Final result                 Please view results for these tests on the individual orders.    CBC Auto Differential [[id number]]  (Abnormal) Collected:  07/03/17 1114    Specimen:  Blood Updated:  07/03/17 1135     WBC 12.17 (H) 10*3/mm3      RBC 5.06 10*6/mm3      Hemoglobin 13.5 (L) g/dL      Hematocrit 41.1 %      MCV 81.2 fL      MCH 26.7 (L) pg      MCHC 32.8 g/dL      RDW 15.6 (H) %      RDW-SD 45.9 fl      Platelets 170 10*3/mm3      Neutrophil % 80.5 (H) %      Lymphocyte % 5.4 (L) %      Monocyte % 11.1 %      Eosinophil % 2.0 %      Basophil % 0.4 %      Immature Grans % 0.6 (H) %      Neutrophils, Absolute 9.80 (H) 10*3/mm3      Lymphocytes, Absolute 0.66 (L) 10*3/mm3      Monocytes, Absolute 1.35 (H) 10*3/mm3      Eosinophils, Absolute 0.24 10*3/mm3      Basophils, Absolute 0.05 10*3/mm3      Immature Grans, Absolute 0.07 (H) 10*3/mm3      nRBC 0.0 /100 WBC     Comprehensive Metabolic Panel [[id number]]  (Abnormal) Collected:  07/03/17 1114    Specimen:  Blood Updated:  07/03/17 1148     Glucose 195 (H) mg/dL      BUN 42 (H) mg/dL      Creatinine 1.84 (H) mg/dL      Sodium 139 mmol/L      Potassium 4.2 mmol/L      Chloride 99 mmol/L      CO2 25.5 mmol/L      Calcium 9.4 mg/dL      Total Protein 6.5 g/dL      Albumin 3.00 (L) g/dL      ALT (SGPT) 60 (H) U/L      AST (SGOT) 67 (H) U/L      Alkaline Phosphatase 201 (H) U/L      Total Bilirubin 0.9 mg/dL      eGFR Non African Amer 36 (L) mL/min/1.73      Globulin 3.5 gm/dL      A/G Ratio 0.9 g/dL      BUN/Creatinine Ratio 22.8     Anion Gap 14.5 mmol/L     Narrative:        The MDRD GFR formula is only valid for adults with stable renal function between ages 18 and 70.    Lipase [[id number]]  (Abnormal) Collected:  07/03/17 1114    Specimen:  Blood Updated:  07/03/17 1148     Lipase 10 (L) U/L     [place] Draw [[id number]] Collected:  07/03/17 1114    Specimen:  Blood Updated:  07/03/17 1301    Narrative:       The following orders were created for panel order [place] Draw.  Procedure                               Abnormality         Status                     ---------                               -----------         ------                     Light Blue Top[[id number]]                                    Final result               Green Top (Gel)[[id number]]                                   Final result               Lavender Top[[id number]]                                      Final result               Gold Top - SST[[id number]]                                    Final result                 Please view results for these tests on the individual orders.    Light Blue Top [[id number]] Collected:  07/03/17 1114    Specimen:  Blood Updated:  07/03/17 1301     Extra Tube hold for add-on      Auto resulted       Green Top (Gel) [[id number]] Collected:  07/03/17 1114    Specimen:  Blood Updated:  07/03/17 1301     Extra Tube Hold for add-ons.      Auto resulted.       Lavender Top [[id number]] Collected:  07/03/17 1114    Specimen:  Blood Updated:  07/03/17 1301     Extra Tube hold for add-on      Auto resulted       Gold Top - SST [[id number]] Collected:  07/03/17 1114    Specimen:  Blood Updated:  07/03/17 1301     Extra Tube Hold for add-ons.      Auto resulted.       Urinalysis With / Culture If Indicated [[id number]]  (Abnormal) Collected:  07/03/17 1316    Specimen:  Urine from Urine, Clean Catch Updated:  07/03/17 1334     Color, UA Dark Yellow (A)     Appearance, UA Cloudy (A)     pH, UA 5.5     Specific Gravity, UA 1.023     Glucose, UA Negative     Ketones, UA Trace (A)     Bilirubin, UA Negative      Blood, UA Negative     Protein, UA 30 mg/dL (1+) (A)     Leuk Esterase, UA Negative     Nitrite, UA Negative     Urobilinogen, UA 1.0 E.U./dL    Urinalysis, Microscopic Only [[id number]]  (Abnormal) Collected:  07/03/17 1316    Specimen:  Urine from Urine, Clean Catch Updated:  07/03/17 1335     RBC, UA 3-5 (A) /HPF      WBC, UA 0-2 /HPF      Bacteria, UA None Seen /HPF      Squamous Epithelial Cells, UA 0-2 /HPF      Hyaline Casts, UA 3-6 /LPF      Methodology Automated Microscopy    POC Glucose Fingerstick [[id number]]  (Abnormal) Collected:  07/03/17 1728    Specimen:  Blood Updated:  07/03/17 1731     Glucose 197 (H) mg/dL     Narrative:       Meter: [id number] : [id number] Elliot Kaur    Hemoglobin A1c [[id number]]  (Abnormal) Collected:  07/03/17 1114    Specimen:  Blood Updated:  07/03/17 1812     Hemoglobin A1C 7.56 (H) %     Narrative:       Hemoglobin A1C Ranges:    Increased Risk for Diabetes  5.7% to 6.4%  Diabetes                     >= 6.5%  Diabetic Goal                < 7.0%    POC Glucose Fingerstick [[id number]]  (Abnormal) Collected:  07/03/17 1933    Specimen:  Blood Updated:  07/03/17 1935     Glucose 149 (H) mg/dL     Narrative:       Meter: [id number] : [id number] Michelle POOL    POC Glucose Fingerstick [[id number]]  (Abnormal) Collected:  07/03/17 2346    Specimen:  Blood Updated:  07/03/17 2347     Glucose 185 (H) mg/dL     Narrative:       Meter: [id number] : [id number] Michelle POOL    aPTT [[id number]]  (Abnormal) Collected:  07/03/17 2310    Specimen:  Blood Updated:  07/03/17 2351     .8 (H) seconds     POC Glucose Fingerstick [[id number]]  (Abnormal) Collected:  07/04/17 0432    Specimen:  Blood Updated:  07/04/17 0434     Glucose 252 (H) mg/dL     Narrative:       Meter: [id number] : [id number] Michelle POOL    Lactic Acid, Plasma [[id number]]  (Normal) Collected:  07/04/17 0542    Specimen:  Blood Updated:  07/04/17 0615     Lactate 1.3 mmol/L      Magnesium [[id number]]  (Normal) Collected:  07/04/17 0543    Specimen:  Blood Updated:  07/04/17 0639     Magnesium 1.9 mg/dL     Comprehensive Metabolic Panel [[id number]]  (Abnormal) Collected:  07/04/17 0543    Specimen:  Blood Updated:  07/04/17 0639     Glucose 286 (H) mg/dL      BUN 31 (H) mg/dL      Creatinine 1.44 (H) mg/dL      Sodium 134 (L) mmol/L      Potassium 4.8 mmol/L      Chloride 96 (L) mmol/L      CO2 23.7 mmol/L      Calcium 8.5 (L) mg/dL      Total Protein 6.3 g/dL      Albumin 2.90 (L) g/dL      ALT (SGPT) 47 (H) U/L      AST (SGOT) 57 (H) U/L      Alkaline Phosphatase 184 (H) U/L      Total Bilirubin 0.9 mg/dL      eGFR Non African Amer 48 (L) mL/min/1.73      Globulin 3.4 gm/dL      A/G Ratio 0.9 g/dL      BUN/Creatinine Ratio 21.5     Anion Gap 14.3 mmol/L     Narrative:       The MDRD GFR formula is only valid for adults with stable renal function between ages 18 and 70.    Lipase [[id number]]  (Abnormal) Collected:  07/04/17 0543    Specimen:  Blood Updated:  07/04/17 0639     Lipase 12 (L) U/L     TSH [[id number]]  (Normal) Collected:  07/04/17 0543    Specimen:  Blood Updated:  07/04/17 0646     TSH 1.610 mIU/mL     CBC & Differential [[id number]] Collected:  07/04/17 0543    Specimen:  Blood Updated:  07/04/17 0721    Narrative:       The following orders were created for panel order CBC & Differential.  Procedure                               Abnormality         Status                     ---------                               -----------         ------                     Scan Slide[[id number]]                                       Final result               CBC Auto Differential[[id number]]        Abnormal            Final result                 Please view results for these tests on the individual orders.    CBC Auto Differential [[id number]]  (Abnormal) Collected:  07/04/17 0543    Specimen:  Blood Updated:  07/04/17 0721     WBC 9.37 10*3/mm3      RBC 5.02 10*6/mm3      Hemoglobin 13.4  (L) g/dL      Hematocrit 41.2 %      MCV 82.1 fL      MCH 26.7 (L) pg      MCHC 32.5 (L) g/dL      RDW 15.4 (H) %      RDW-SD 46.6 fl      MPV -- fL       .        Platelets 168 10*3/mm3      Neutrophil % 73.5 %      Lymphocyte % 10.9 (L) %      Monocyte % 10.4 %      Eosinophil % 3.4 %      Basophil % 0.7 %      Immature Grans % 1.1 (H) %      Neutrophils, Absolute 6.89 10*3/mm3      Lymphocytes, Absolute 1.02 10*3/mm3      Monocytes, Absolute 0.97 10*3/mm3      Eosinophils, Absolute 0.32 10*3/mm3      Basophils, Absolute 0.07 10*3/mm3      Immature Grans, Absolute 0.10 (H) 10*3/mm3      nRBC 0.0 /100 WBC     Scan Slide [[id number]] Collected:  07/04/17 0543    Specimen:  Blood Updated:  07/04/17 0721     Ovalocytes Mod/2+     WBC Morphology Normal     Large Platelets Slight/1+    POC Glucose Fingerstick [[id number]]  (Abnormal) Collected:  07/04/17 0721    Specimen:  Blood Updated:  07/04/17 0722     Glucose 273 (H) mg/dL     Narrative:       Meter: [id number] : [id number] Sinai Muhammad    aPTT [[id number]]  (Abnormal) Collected:  07/04/17 0727    Specimen:  Blood Updated:  07/04/17 0825     PTT 58.8 (H) seconds     POC Glucose Fingerstick [[id number]]  (Abnormal) Collected:  07/04/17 1116    Specimen:  Blood Updated:  07/04/17 1119     Glucose 298 (H) mg/dL     Narrative:       Meter: [id number] : [id number] Sinai Muhammad    JANELLE,PE and FLC, Serum [[id number]] Collected:  07/04/17 1123    Specimen:  Blood Updated:  07/04/17 1124    Lupus Anticoag / Cardiolipin Ab [[id number]] Collected:  07/04/17 1124    Specimen:  Blood Updated:  07/04/17 1124    aPTT [[id number]]  (Abnormal) Collected:  07/04/17 1124    Specimen:  Blood from Arm, Right Updated:  07/04/17 1144     PTT 80.1 (H) seconds     C-reactive Protein [[id number]]  (Abnormal) Collected:  07/04/17 1124    Specimen:  Blood Updated:  07/04/17 1155     C-Reactive Protein 10.73 (H) mg/dL     Iron Profile [[id number]]  (Abnormal) Collected:  07/04/17 1124     Specimen:  Blood Updated:  07/04/17 1155     Iron 13 (L) mcg/dL      Iron Saturation 6 (L) %      Transferrin 139 (L) mg/dL      TIBC 207 mcg/dL     Ferritin [[id number]]  (Normal) Collected:  07/04/17 1124    Specimen:  Blood Updated:  07/04/17 1204     Ferritin 228.30 ng/mL     PSA [[id number]]  (Normal) Collected:  07/04/17 1124    Specimen:  Blood Updated:  07/04/17 1210     PSA <0.014 ng/mL     CEA [[id number]] Collected:  07/04/17 1124    Specimen:  Blood Updated:  07/04/17 1214     CEA 1.34 ng/mL     Narrative:       CEA Reference Ranges:    Non Smokers:   Less than 3 ng/mL  Smokers:       Less than 5 ng/mL    Vitamin B12 [[id number]]  (Abnormal) Collected:  07/04/17 1124    Specimen:  Blood Updated:  07/04/17 1214     Vitamin B-12 >2000 (H) pg/mL     POC Glucose Fingerstick [[id number]]  (Abnormal) Collected:  07/04/17 1620    Specimen:  Blood Updated:  07/04/17 1622     Glucose 276 (H) mg/dL     Narrative:       Meter: [id number] : [id number] Sinai Muhammad    POC Glucose Fingerstick [[id number]]  (Abnormal) Collected:  07/04/17 2040    Specimen:  Blood Updated:  07/04/17 2043     Glucose 274 (H) mg/dL     Narrative:       Meter: [id number] : [id number] Herb [place]    Basic Metabolic Panel [[id number]]  (Abnormal) Collected:  07/05/17 0521    Specimen:  Blood Updated:  07/05/17 0606     Glucose 151 (H) mg/dL      BUN 24 (H) mg/dL      Creatinine 1.53 (H) mg/dL      Sodium 138 mmol/L      Potassium 4.5 mmol/L      Chloride 101 mmol/L      CO2 23.1 mmol/L      Calcium 8.3 (L) mg/dL      eGFR Non African Amer 44 (L) mL/min/1.73      BUN/Creatinine Ratio 15.7     Anion Gap 13.9 mmol/L     Narrative:       The MDRD GFR formula is only valid for adults with stable renal function between ages 18 and 70.    CBC & Differential [[id number]] Collected:  07/05/17 0521    Specimen:  Blood Updated:  07/05/17 0633    Narrative:       The following orders were created for panel order CBC & Differential.  Procedure                                Abnormality         Status                     ---------                               -----------         ------                     Scan Slide[[id number]]                                                                  CBC Auto Differential[[id number]]        Abnormal            Final result                 Please view results for these tests on the individual orders.    CBC Auto Differential [[id number]]  (Abnormal) Collected:  07/05/17 0521    Specimen:  Blood Updated:  07/05/17 0633     WBC 12.25 (H) 10*3/mm3      RBC 5.31 10*6/mm3      Hemoglobin 13.9 g/dL      Hematocrit 43.4 %      MCV 81.7 fL      MCH 26.2 (L) pg      MCHC 32.0 (L) g/dL      RDW 15.5 (H) %      RDW-SD 46.4 fl      Platelets 193 10*3/mm3      Neutrophil % 76.5 (H) %      Lymphocyte % 9.9 (L) %      Monocyte % 9.6 %      Eosinophil % 2.2 %      Basophil % 0.7 %      Immature Grans % 1.1 (H) %      Neutrophils, Absolute 9.37 (H) 10*3/mm3      Lymphocytes, Absolute 1.21 10*3/mm3      Monocytes, Absolute 1.18 10*3/mm3      Eosinophils, Absolute 0.27 10*3/mm3      Basophils, Absolute 0.08 10*3/mm3      Immature Grans, Absolute 0.14 (H) 10*3/mm3      nRBC 0.0 /100 WBC     POC Glucose Fingerstick [[id number]]  (Normal) Collected:  07/05/17 0735    Specimen:  Blood Updated:  07/05/17 0736     Glucose 116 mg/dL     Narrative:       Meter: [id number] : [id number] Ben Lyly    aPTT [[id number]]  (Abnormal) Collected:  07/05/17 0854    Specimen:  Blood Updated:  07/05/17 0945     PTT 79.2 (H) seconds     POC Glucose Fingerstick [[id number]]  (Abnormal) Collected:  07/05/17 1131    Specimen:  Blood Updated:  07/05/17 1133     Glucose 181 (H) mg/dL     Narrative:       Meter: [id number] : [id number] [place]    POC Glucose Fingerstick [[id number]]  (Abnormal) Collected:  07/05/17 1642    Specimen:  Blood Updated:  07/05/17 1644     Glucose 297 (H) mg/dL     Narrative:       Meter: [id number] : [id number] Ben Desouza     Cancer Antigen 19-9 [[id number]] Collected:  07/04/17 1124    Specimen:  Blood Updated:  07/05/17 1709     CA 19-9 16 U/mL       Roche ECLIA methodology       Narrative:       Performed at:  [address]  [id number]  : Robel Del Valle PhD, Phone:  [phone]    AFP Tumor Marker [[id number]] Collected:  07/04/17 1124    Specimen:  Blood Updated:  07/05/17 1709     AFP Tumor Marker 1.2 ng/mL       Roche ECLIA methodology       Narrative:       Performed at:  [address]  [id number]  : Robel Del Valle PhD, Phone:  [phone]    Cardiolipin Antibody, IgA [[id number]] Collected:  07/04/17 1124    Specimen:  Blood Updated:  07/05/17 1709     Anticardiolipin IgA <9 APL U/mL                                 Negative:              <12                            Indeterminate:     12 - 20                            Low-Med Positive: >20 - 80                            High Positive:         >80       Narrative:       Performed at:  [address]  [id number]  : Robel Del Valle PhD, Phone:  [phone]          Imaging Results (most recent)     Procedure Component Value Units Date/Time    CT Abdomen Pelvis Without Contrast [[id number]] Collected:  07/03/17 1256     Updated:  07/03/17 1629    Narrative:       CT ABDOMEN AND PELVIS WITHOUT IV CONTRAST     HISTORY: 77-year-old male with abdominal pain and diarrhea.     TECHNIQUE: 3 mm images were obtained through the abdomen and pelvis  without the administration of IV contrast. Compared with noncontrasted  CT of the abdomen and pelvis from 03/29/2017.     FINDINGS: There has been significant interval increase in splenic size  currently measuring approximately 16.5 cm in maximal diameter,  previously approximately 12 cm. There is a new small volume of ascites  within the abdomen and pelvis. The main portal vein appears distended  and is  heterogeneous. The right portal vein and left portal vein are  distended as well and there is some periportal edema. There is also a  thickened appearance of the common bile duct. No definite intrahepatic  biliary dilatation is seen. Noncontrasted pancreas, adrenals, and  kidneys appear unremarkable other than a single tiny nonobstructing left  renal stone. No acute bowel abnormality is seen. Appendix appears within  normal limits. Multiple brachytherapy seeds are noted within the  prostate. There are moderately extensive abdominal aortic  atherosclerotic changes without aneurysmal dilatation. There are no  pleural effusions.       Impression:       Findings are suspicious for portal vein thrombosis. There  has also been interval development of splenomegaly and ascites which may  be secondary to portal venous thrombus. Further evaluation with an IV  contrast-enhanced CT of the abdomen and pelvis is recommended.     Immediately discussed with Dr. Siddiqui by phone.     This report was finalized on 7/3/2017 4:26 PM by Dr. Stephanie Adams MD.       CT Abdomen Pelvis With Contrast [[id number]] Collected:  07/03/17 1524     Updated:  07/03/17 1631    Narrative:       CT ABDOMEN AND PELVIS WITH IV CONTRAST     HISTORY: 77-year-old male with abnormal appearance of the portal vein on  a noncontrasted CT of the abdomen and pelvis performed earlier today.     TECHNIQUE: 3 mm images were obtained through the abdomen and pelvis  after the administration of IV contrast. Compared with the noncontrasted  CT abdomen and pelvis performed earlier today.     FINDINGS: There is extensive acute thrombosis of the main portal vein,  right and left portal veins, and nearly the entire splenic vein. The SMV  is patent. The spleen is enlarged and there are 3 small wedge-shaped  regions of decreased enhancement. There is a heterogeneous enhancement  pattern throughout the central aspect of the liver. There is a tiny  amount of perihepatic and  perisplenic ascites and there is a small  amount of free fluid within the dependent aspect of the pelvis. No acute  abnormality is seen involving the pancreas, adrenals, or kidneys. Single  nonobstructing left renal stone is noted. No acute bowel abnormality is  seen. Prostatic brachytherapy seeds again noted.       Impression:       1. Extensive acute thrombosis of the portal vein, right and left  intrahepatic portal veins, and there is extensive acute thrombosis of  nearly the entire splenic vein. The SMV is patent.  2. Development of splenomegaly and there are 3 small evolving splenic  infarctions. Very small volume of free fluid within the abdomen and  pelvis.     Discussed with Dr. Siddiqui.     This report was finalized on 7/3/2017 4:27 PM by Dr. Stephanie Adams MD.       XR Chest 1 View [[id number]] Collected:  07/03/17 1855     Updated:  07/03/17 1901    Narrative:       CHEST 07/03/2017 AT 1819 HOURS     CLINICAL HISTORY: Dyspnea. History of lung cancer.     The lungs are somewhat poorly inflated, but appear free of infiltrates  or masses. There are no pleural effusions. The cardiac silhouette is  within normal limits.       Impression:       No evidence of acute disease within the chest.     This report was finalized on 7/3/2017 6:58 PM by Dr. Sher Love MD.           reviewed    ECG/EMG Results (most recent)     Procedure Component Value Units Date/Time    ECG 12 Lead [[id number]] Collected:  07/03/17 1208     Updated:  07/03/17 2202    Narrative:       RR Interval= 1132 ms  NC Interval= 172 ms  QRSD Interval= 90 ms  QT Interval= 400 ms  QTc Interval= 376 ms  Heart Rate= 53 ms  P Axis= 41 deg  QRS Axis= -29 deg  T Wave Axis= 20 deg  I: 40 Axis= -8 deg  T: 40 Axis= -42 deg  ST Axis= 31 deg  SINUS RHYTHM  BORDERLINE LEFT AXIS DEVIATION  CONSIDER ANTERIOR INFARCT  NO SIGNIFICANT CHANGE FROM PREVIOUS ECG  Electronically Signed by:  Carlos Waggoner MD ([facility]) 03-Jul-2017 22:00:25  Date and Time of Study:  2017-07-03 12:08:11    ECG 12 Lead [[id number]] Collected:  07/03/17 1847     Updated:  07/03/17 2247    Narrative:       RR Interval= 732 ms  DC Interval= 192 ms  QRSD Interval= 84 ms  QT Interval= 388 ms  QTc Interval= 453 ms  Heart Rate= 82 ms  P Axis= 49 deg  QRS Axis= -37 deg  T Wave Axis= 36 deg  I: 40 Axis= -4 deg  T: 40 Axis= -65 deg  ST Axis= 51 deg  SINUS RHYTHM  LEFT AXIS DEVIATION  CONSIDER ANTERIOR INFARCT  NO SIGNIFICANT CHANGE FROM PREVIOUS ECG  Electronically Signed by:  Roberth DubrinSYDNEE) ([facility]) 03-Jul-2017 22:46:33  Date and Time of Study: 2017-07-03 18:47:36    Adult Transthoracic Echo Complete [[id number]] Collected:  07/04/17 1008     Updated:  07/04/17 1226     BSA 1.9 m^2      IVSd 1.1 cm      LVIDd 4.4 cm      LVIDs 3.3 cm      LVPWd 1.1 cm      IVS/LVPW 1.0     FS 25.0 %      EDV(Teich) 87.7 ml      ESV(Teich) 44.1 ml      EF(Teich) 49.7 %      EDV(cubed) 85.2 ml      ESV(cubed) 35.9 ml      EF(cubed) 57.8 %      LV mass(C)d 168.9 grams      LV mass(C)dI 90.4 grams/m^2      SV(Teich) 43.6 ml      SI(Teich) 23.3 ml/m^2      SV(cubed) 49.2 ml      SI(cubed) 26.4 ml/m^2      Ao root diam 3.4 cm      Ao root area 9.1 cm^2      ACS 1.5 cm      LVOT diam 2.0 cm      LVOT area 3.1 cm^2      LVOT area(traced) 3.1 cm^2      RVOT diam 1.8 cm      RVOT area 2.5 cm^2      LVLd ap4 8.4 cm      EDV(MOD-sp4) 139.0 ml      LVLs ap4 7.6 cm      ESV(MOD-sp4) 71.0 ml      EF(MOD-sp4) 48.9 %      LVLd ap2 7.7 cm      EDV(MOD-sp2) 95.0 ml      LVLs ap2 6.3 cm      ESV(MOD-sp2) 40.0 ml      EF(MOD-sp2) 57.9 %      SV(MOD-sp4) 68.0 ml      SI(MOD-sp4) 36.4 ml/m^2      SV(MOD-sp2) 55.0 ml      SI(MOD-sp2) 29.4 ml/m^2      Ao root area (BSA corrected) 1.8     Ao root area (BSA corrected) 57.9 ml/m^2      CONTRAST EF 4CH 48.9 ml/m^2      LV Diastolic corrected for BSA 74.4 ml/m^2      LV Systolic corrected for BSA 38.0 ml/m^2      MV A dur 0.13 sec      MV E max jose 74.0 cm/sec      MV A max jose 87.5 cm/sec      MV  E/A 0.85     MV V2 mean 52.6 cm/sec      MV mean PG 1.0 mmHg      MV V2 VTI 41.3 cm      MVA(VTI) 1.9 cm^2      MV P1/2t max naveed 106.0 cm/sec      MV P1/2t 80.2 msec      MVA(P1/2t) 2.7 cm^2      MV dec slope 387.0 cm/sec^2      MV dec time 0.28 sec      Ao V2 mean 105.0 cm/sec      Ao mean PG 5.0 mmHg      Ao mean PG (full) 3.0 mmHg      Ao V2 VTI 36.0 cm      NAPOLENO(I,A) 2.2 cm^2      NAPOLEON(I,D) 2.2 cm^2      LV V1 mean PG 2.0 mmHg      LV V1 mean 68.6 cm/sec      LV V1 VTI 25.3 cm      SV(Ao) 326.9 ml      SI(Ao) 174.9 ml/m^2      SV(LVOT) 79.5 ml      SV(RVOT) 30.5 ml      SI(LVOT) 42.5 ml/m^2      PA V2 max 95.7 cm/sec      PA max PG 3.7 mmHg      PA max PG (full) 2.4 mmHg       CV ECHO SHAGGY - PVA(V,A) 1.5 cm^2       CV ECHO SHAGGY - PVA(V,D) 1.5 cm^2      PA acc slope 883.0 cm/sec^2      PA acc time 0.11 sec      RV V1 max PG 1.3 mmHg      RV V1 mean PG 1.0 mmHg      RV V1 max 57.1 cm/sec      RV V1 mean 34.4 cm/sec      RV V1 VTI 12.0 cm      TR max naveed 222.0 cm/sec      PA pr(Accel) 30.0 mmHg      Pulm Sys Naveed 69.0 cm/sec      Pulm Kaufman Naveed 52.4 cm/sec      Pulm S/D 1.3     Qp/Qs 0.38     Pulm A Revs Dur 0.12 sec      Pulm A Revs Naveed 30.5 cm/sec      MVA P1/2T LCG 2.1 cm^2       CV ECHO SHAGGY - BZI_BMI 23.3 kilograms/m^2       CV ECHO SHAGGY - BSA([facility]) 1.9 m^2       CV ECHO SHAGGY - BZI_METRIC_WEIGHT 71.7 kg       CV ECHO SHAGGY - BZI_METRIC_HEIGHT 175.3 cm      BH CV VAS BP RIGHT /61 mmHg      TDI S' 14.00 cm/sec      RV Base 4.00 cm      LA volume 49.0 cm3      E/E' ratio 10.0     LA Volume Index 29.0 mL/m2      Ao pk naveed 149.0 cm/sec      Lat Peak E' Naveed 9.0 cm/sec      Med Peak E' Naveed 7.00 cm/sec      Ao max PG 9.0 mmHg      LA dimension 6.0 cm      LA dimension(2D) 18.0 cm      TAPSE (>1.6) 2.40 cm2      Echo EF Estimated 55 %     Narrative:       · eft Ventricle Left ventricular systolic function is normal. Calculated   EF = 48.9%. Estimated EF was in disagreement with the calculated EF.    Estimated EF = 55%. Normal left ventricular cavity size and wall thickness   noted. All left ventricular wall segments contract normally. Left   ventricular diastolic function is normal.  · There is moderate thickening of the aortic valve. No aortic valve   regurgitation is present. No aortic valve stenosis is present.  · Trace mitral valve regurgitation is present.  · Physiologic tricuspid valve regurgitation is present. Insufficient TR   velocity profile to estimate the right ventricular systolic pressure.       ECG 12 Lead [[id number]] Collected:  07/03/17 2343     Updated:  07/04/17 1724    Narrative:       RR Interval= 750 ms  ID Interval= 188 ms  QRSD Interval= 94 ms  QT Interval= 380 ms  QTc Interval= 439 ms  Heart Rate= 80 ms  P Axis= 48 deg  QRS Axis= -35 deg  T Wave Axis= 38 deg  I: 40 Axis= -11 deg  T: 40 Axis= -49 deg  ST Axis= 60 deg  SINUS RHYTHM  POOR R WAVE PROGRESSION  LEFT AXIS DEVIATION  NO SIGNIFICANT CHANGE FROM PREVIOUS ECG  Electronically Signed by:  Darinel Crocker ([facility]) 04-Jul-2017 17:23:38  Date and Time of Study: 2017-07-03 23:43:33        reviewed    Assessment/Plan     PVT/SVT  Factor V Leiden   Dementia  Weight Loss  Anorexia    The frustration due to his recurrent thrombosis despite AC is clear but presently no obvious malignancy has been detected and feel his recent colon is adequate for now, also feel an advanced malignancy would also have presented as refractory FINN, will proceed to an EGD as that can be performed quickly and on anticoagulation. Further w/u to be based on those findings.    I discussed the patients findings and my recommendations with patient and Daughter and wife.     Tim Xie MD  07/05/17  5:52 PM    Time: Not recorded

## 2017-07-05 NOTE — PLAN OF CARE
Problem: Patient Care Overview (Adult)  Goal: Plan of Care Review  Outcome: Ongoing (interventions implemented as appropriate)    07/04/17 1924 07/05/17 0219 07/05/17 0355   Coping/Psychosocial Response Interventions   Plan Of Care Reviewed With --  patient --    Patient Care Overview   Progress no change --  --    Outcome Evaluation   Outcome Summary/Follow up Plan --  --  pt continues to recieve heparin, ptt ordered for this am, pt denies pain, no acute distress, vitals remain stable, pt confused this pm and has had to be reioriented several times.        Goal: Adult Individualization and Mutuality  Outcome: Ongoing (interventions implemented as appropriate)  Goal: Discharge Needs Assessment  Outcome: Ongoing (interventions implemented as appropriate)    Problem: Skin Integrity Impairment, Risk/Actual (Adult)  Goal: Identify Related Risk Factors and Signs and Symptoms  Outcome: Ongoing (interventions implemented as appropriate)  Goal: Skin Integrity/Wound Healing  Outcome: Ongoing (interventions implemented as appropriate)    Problem: Fall Risk (Adult)  Goal: Identify Related Risk Factors and Signs and Symptoms  Outcome: Ongoing (interventions implemented as appropriate)  Goal: Absence of Falls  Outcome: Ongoing (interventions implemented as appropriate)    Problem: Diabetes, Type 2 (Adult)  Goal: Signs and Symptoms of Listed Potential Problems Will be Absent or Manageable (Diabetes, Type 2)  Outcome: Ongoing (interventions implemented as appropriate)

## 2017-07-06 NOTE — ANESTHESIA POSTPROCEDURE EVALUATION
Patient: Alfredo Gonzalez    Procedure Summary     Date Anesthesia Start Anesthesia Stop Room / Location    07/06/17 1359 1976  [facility] /  [facility]       Procedure Diagnosis Surgeon Provider    ESOPHAGOGASTRODUODENOSCOPY to jejunum (N/A Esophagus) Portal vein thrombosis; Iron deficiency anemia, unspecified iron deficiency anemia type; Esophageal and gastric varices; Gastritis; History of duodenal ulcer  (Portal vein thrombosis [I81]; Iron deficiency anemia, unspecified iron deficiency anemia type [D50.9]) MD Patel Zhu MD          Anesthesia Type: MAC  Last vitals  /93 (07/06/17 1345)    Temp 37.1 °C (98.8 °F) (07/06/17 1345)    Pulse 63 (07/06/17 1345)   Resp 11 (07/06/17 1345)    SpO2 95 % (07/06/17 1345)      Post Anesthesia Care and Evaluation    Patient location during evaluation: PACU  Patient participation: complete - patient participated  Level of consciousness: awake and alert  Pain score: 0  Pain management: adequate  Airway patency: patent  Anesthetic complications: No anesthetic complications    Cardiovascular status: acceptable  Respiratory status: acceptable  Hydration status: acceptable

## 2017-07-06 NOTE — PROGRESS NOTES
Subjective .  Modest improvement in fatigue , worsening foot pain persisting    REASONS FOR FOLLOWUP:    1. Previous assessment per superior sagittal sinus thrombosis, left frontal intracerebral hemorrhage, acute calf vein thrombosis, hypercoagulable assessment positive for heterozygosity for factor V 5 Leiden gene mutation.   2. Ongoing anticoagulation with Coumadin followed by Dr. Gillespie.   3. Admission on 08/04/2014 with severe right knee pain, spontaneous infusion, calf vein thrombosis despite therapeutic anticoagulation.   4. Repeat consultation 08/05/2014 leading to chest CT revealing irregula r nodule in the right apex indeterminate ? malignancy.   5. Thoracic surgery consultation, Neurovascular surgery consultation obtained, IVC filter placed, exploratory right video assisted fluoroscopy with wedge resection right upper lobe lymphadenectomy 08/15/ 2014, pathology consistent with primary lung adenocarcinoma, stage [id number] stage IA, EGFR mutation, positive for Exon 19 mutation, (potentially responsive to EGFR tyrosine kinase therapy).   6. The patient was seen in the office 09/23/2014, adjuvant therapy not felt necessary, IVC filter to be removed, continue Xarelto therapy thereafter recommended  7. Patient reviewed December 21, 2016 with ongoing anemia-microcytic, hypochromic  8. Determination of considerable iron deficiency and B12 deficiency?  Pernicious anemia, replacement therapy is initiated  9. Patient reviewed March 8, considerable improvement per anemia though iron deficiency persists, patient with progressive foot pain, foot lesions developing, follow-up scan obtained  10. Patient reviewed April 3, CT scans negative, anemia resolved, potential lower extremity vasculopathy-dermatologic assessment pursued  11. Patient reviewed May 31, 2017, no significant improvement per lower extremity vasculopathy    HISTORY OF PRESENT ILLNESS:  The patient is a 77 y.o. year old male.  This patient was admitted to  the hospital intensive care unit yesterday with a at least 3 days history of persistent abdominal pain and 67/10 in the periumbilical area and epigastric area continues associated with mild nausea refused they have 3 N1 of dictation.  He has no abdominal distention no fevers no chills no diaphoresis and no retrosternal chest pain.  The time of admission through the emergency room he was found to have significant abnormalities in liver function tests filter assessment documented to a CT scan of the abdomen to the patient had extensive portal vein thrombosis.  He was also noticed that the patient splenomegaly.  Thrombosis of the splenic vein.  Obviously the patient was admitted to the intensive care unit he was placed on heparin and actually his abdominal pain has substantially subsided since then to the point of her solution and this is starting to have a and regular diet today.  The patient states today that the abdominal pain is no new has been ongoing on for at least 2to3 months Mild and intensity progressive to the point that he got yesterday.  Also he has lost his appetite take completely and he has lost 20 pounds of weight in the last 2-3 months.  Again he has not had any fever or chills he doesn't have any cough or sputum production he has not had any shortness of breath.  He denies any changes in his bowel habits.  He denies any hematemesis or melena or interim range up.  He denies any hematuria.  He hasn't had any frequency to urinate and he denies any recent urinary tract infections.  Ibuprofen skin lesions that were documented before were treated by his primary internal medicine physician with resolution.  No new lesions evolving.    History of Past  Illness     Mr. Gonzalez is a 77-year-old male who we had initially seen in consultation 02/04 through 02/14/2002 having presented at that point with a superior sagittal sinus thrombosis and left frontal intracerebral hemorrhage. Evaluation thereafter included a  hypercoagulable assessment when he was found also to have an acute calf vein thrombosis and spontaneous calf vein thrombosis on the left . The patient fortunately responded well to rehabilitation and anticoagulation. He was eventually placed on Coumadin long term and hypercoagulable assessment had revealed findings consistent with heterozygosity for factor V Leiden gene mutation. The patie nt did not have followup with us, though that is not exactly certain why.       Mr. Gonzalez was later admitted 08/04/2014 through Dr. Gillespie. The patient had gone to [employer] and while standing in [employer] he noticed his right leg starting to cramp. He began to have noticeable limping thereafter and then when knee pain became so severe his wife called 911 and he was brought to [facility] emergency room. In the ER he was found to have calf vein thrombosis involving the right leg as well as a swollen k n ee with effusion. He was admitted to be seen by Orthopedics and was found to incidentally have a prothrombin time 3.1 and thus we were asked to see him for his thrombosis despite adequate anticoagulation. At that point, he was seen by this physician and a s a result of there being concern about additional factor such as a malignancy, a CT chest, abdomen and pelvis was obtained 08/05/2014. This revealed unexpectedly an irregular 2.0 x 1.1 cm lesion in the right apical region. This was concerning for a malig n jorge. He also had non-obstructing left renal calculus and extensive lumbar spinal degenerative disease. The patient was seen by Orthopedics again with a knee tap. (Followup is still remaining concerning this). The patient was also seen by Thoracic surgeangely joyner . Please note that Dopplers 08/06/2014 revealed a subacute venous thrombosis in the right popliteal vein as well as subacute thrombus in the right calf vein. The patient was seen by Dr. Meyer per Thoracic surgery and a PET scan was obtained showing the no d ule in  right lung apex to demonstrate low level activity somewhat indeterminate with an SUV of 2.0. No other abnormalities were present. It was felt that the patient should proceed to surgical resection. An IVC filter was placed prior to surgery 08/13/201 4 by Dr. Pastor Trinh. PFTs revealed a moderate airway restriction and after discussion on 08/15/2014 the patient underwent exploratory bronchoscopy, exploratory right video assisted thoracoscopy with wedge resection, right upper lobe and lymphadenectom y . The patient remains hospitalized at this point to have the surgery rather than return for subsequent surgery. Pathology per surgical specimen revealed a primary lung adenocarcinoma, demonstrating a lepidic growth pattern. R4, station 7 and R10 nodes wer e all negative for disease. The tumor size was 2.1 x 1.3 x 1.2 unifocal adenocarcinoma. His pathologic staging was a pT1b pN0M0- stage IA. Now available also at the time of this dictation is the finding that the tumor was EGFR mutation positive with a le s ion positive for E746 [id number] mutation, Exon 19. These are reported to correlate with responsiveness to EGFR tyrosine kinase inhibitor therapies. The patient was seen by Dr. Meyer postoperative and is doing well and is also being seen by Dr. Trinh with consideration of removal of his IVC filter.       The patient now presents back to our practice for review of all of this. We discussed his time line up to this point. In a long discussion it is felt that adjuvant chemotherapy is not warranted in Mr. Gonzalez's care. Reasonably followup is however likely with at least a chest x-ray on a yearly basis possibly through Dr. Gillespie.       The patient's had follow-up through Dr. CHRISTIAN Walker since his last visit here as well as reviews to Dr. Meyer.  This includes evidently a trial of oral iron when the patient was found to be anemic previously as well as recognition of the patient's kidney function-CKD 3.  She also was seen in  emergency room in June of this year after falling from a ladder with laboratory studies revealing anemia with hemoglobin 8.5, hematocrit 30.5 and MCV that Dr. 63.3 with MCH of 17.6 and an MCHC of 27.9, platelet count of 333,000 with normal differential.  In reviewing the patient's blood counts he is definitely developed microcytosis and hypochromia since June and, as such, when immediately as concerned about iron deficiency as an underlying issue.  He also has chronic leukocytosis that appears to be unchanged from previous.  We discussed IV iron as a treatment to address the immediate issue and thereafter try to determine the source of blood loss if indeed iron deficiency is documented.     The patient went on to be treated with both IV Feraheme ×2 on December 23 and December 30 as well as be given injections of B12 both visits.  As he seen back today January 30 he is feeling some better and his wife has also noticed a difference.  We plan to continue the B12 loading over the next several weeks as well as assessing him for pernicious anemia laboratories today.  The patient is now seen back March 08, 2017.  While he's had some improvement in his energy level he still has pain in his feet which has progressed associated with a mildly raised, violaceous group of lesions involving his calf and left foot have developed.  He indicates he is to see a podiatrist in the near future and with the findings of pernicious anemia, ongoing iron deficiency, and unexplained rash developing in the setting of a history of non-small cell lung cancer I requested a PET/CT-?  Paraneoplasia or even new malignant process.    The patient initially was to undergo a PET/CT but this was declined.  A CT of chest and pelvis was obtained with no evidence of metastatic disease identified.  The patient hematologically went on to further improve but has, unfortunately, considerable pain in his lower extremities associated with a rash that has a  partially vasculitic appearance.  The areas in question are erythematous, serpiginous, mildly raised, and very tender to palpation.  He's been seen by podiatry and treated topically without effect.   The patient was thereafter seen evidently by both dermatology and vascular surgery.  Unfortunately he still has significant pain though this is managed in part by current use of pain medications with modest use thus far.  He is encouraged to use this more frequently and plans are to contact Dr. CHRISTIAN Walker as well.    Past Medical History:   Diagnosis Date   • Arthralgia    • Arthritis    • Back pain    • Chronic mixed headache syndrome    • Deep venous thrombosis of right popliteal vein     of right knee hemarthrosis   • Diabetes mellitus    • Diverticulosis    • DJD (degenerative joint disease)    • Factor V Leiden    • GERD (gastroesophageal reflux disease)    • Hypercholesterolemia    • Hypertension    • Intracerebral hemorrhage    • Lumbar spinal stenosis    • Muscular aches    • Neck pain    • Non-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   • Thrombosis     Lower extremity       ONCOLOGIC HISTORY:  (History from previous dates can be found in the separate document.)    No current facility-administered medications on file prior to encounter.      Current Outpatient Prescriptions on File Prior to Encounter   Medication Sig Dispense Refill   • Calcium Carbonate-Vitamin D (CALCIUM-D) 600-400 MG-UNIT tablet Take 1 tablet by mouth daily.     • carvedilol (COREG) 12.5 MG tablet      • cilostazol (PLETAL) 50 MG tablet      • clotrimazole-betamethasone (LOTRISONE) 1-0.05 % cream      • HYDROcodone-acetaminophen (NORCO) 5-325 MG per tablet Take 1 tablet by mouth every 6 (six) hours as needed.     • Insulin Syringe-Needle U-100 (RELION INSULIN SYR  "0.5ML/31G) 31G X 5/16\" 0.5 ML misc      • irbesartan-hydrochlorothiazide (AVALIDE) 300-12.5 MG tablet Take 1 tablet by mouth daily.     • pravastatin (PRAVACHOL) 80 MG tablet Take  by mouth daily.     • rivaroxaban (XARELTO) tablet Take 20 mg by mouth daily with dinner.     • vitamin B-6 (PYRIDOXINE) 100 MG tablet Take 100 mg by mouth.         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     Social History Main Topics   • Smoking status: Former Smoker     Packs/day: 1.00     Years: 20.00     Quit date: 2/8/1996   • Smokeless tobacco: Not on file   • Alcohol use No   • Drug use: No   • Sexual activity: No     Other Topics Concern   • Not on file     Social History Narrative         Cancer-related family history includes Colon cancer in his mother; Lung cancer (age of onset: 77) in his brother.     Review of Systems    General: no fever, chills,significant fatigue,20 lb loss weight changes, and lack of appetite.  Eyes: no epiphora, xerophthalmia,conjunctivitis, pain, glaucoma, blurred vision, blindness, secretion, photophobia, proptosis, diplopia.  Ears: no otorrhea, tinnitus, otorrhagia, deafness, pain, vertigo.  Nose: no rhinorrhea, epistaxis, alteration in perception of odors, sinuses pressure.  Mouth: no alteration in gums or teeth,  ulcers, no difficulty with mastication or deglut ion, no odynophagia.  Neck: no masses or pain, no thyroid alterations, no pain in muscles or arteries, no carotid odynia, no crepitation.  Respiratory: no cough, sputum production, dyspnea, trepopnea, pleuritic pain, hemoptysis.  Heart: no syncope, irregularity, palpitations, angina, orthopnea, paroxysmal nocturnal dyspnea.  Vascular Venous: no " tenderness,edema, palpable cords, postphlebitic syndrome, skin changes or ulcerations.  Vascular Arterial: no distal ischemia, claudication, gangrene, neuropathic ischemic pain, skin ulcers, paleness or cyanosis.  GI: no dysphagia, odynophagia, no regurgitation, no heartburn,no indigestion,no nausea,no vomiting,no hematemesis ,no melena,no jaundice,no distention, no obstipation,no enterorrhagia,no proctalgia,no anal  lesions, nochanges in bowel habits. Abdominal pain as stated  : no frequency, hesitancy, hematuria, discharge, pain.  Musculoskeletal: no muscle or tendon pain or inflammation, joint pain, edema, functional limitation, fasciculations, mass.  Neurologic: no headache, seizures, alterations on Craneal nerves, no motor or senssory deficit, normal coordination, no alteration in memory, orientation, calculation,writting, verbal or written language.  Skin: no rashes, pruritus or localized lesions.  Psychiatric: no anxiety, depression, agitation, delusions, proper insight.        A comprehensive 14 point review of systems was performed and was negative except as mentioned.    Objective      Vitals:    07/05/17 1938 07/05/17 2349 07/06/17 0810 07/06/17 0850   BP: 131/70 155/86 141/69 128/69   BP Location: Left arm Left arm Right arm Right arm   Patient Position: Lying Lying  Lying   Pulse: 65 66     Resp: 16 16 18 18   Temp: 98 °F (36.7 °C) 98.8 °F (37.1 °C) 98.6 °F (37 °C) 98.2 °F (36.8 °C)   TempSrc: Oral Oral Oral Oral   SpO2: 96% 92%     Weight:       Height:         Current Status 5/31/2017   ECOG score 1       Physical Exam    GENERAL: Well-developed, well-nourished male in no acute distress.   SKIN: Warm, dry,healed lesions in le  HEAD: Normocephalic.   EYES: Pupils equal, round and reactive to light. EOMs intact. Conjunctivae normal.   EARS: Hearing intact.   NOSE: Septum midline. No excoriations or nasal discharge.   MOUTH: Tongue is well papillated; no stomatitis or ulcers. Lips normal.   THROAT:  Oropharynx without lesions or exudates.   NECK: Supple with good range of motion; no thyromegaly or masses, no JVD or bruits.   LYMPHATICS: No cervical, supraclavicular, axillary or inguinal adenopathy.   CHEST: Decreased breath sounds bilateral bases.   CARDIAC: Status post right video assisted thoracoscopy with healed incision sites.   ABDOMEN: Soft, nontender with no organomegaly or masses. No abdominal pain, palpable spleen 3 cm blcm  EXTREMITIES: Without palpable cords or positive Lucius's signs No clubbing, cyanosis or edema.   NEURO: No focal neurological deficits.         RECENT LABS:CT ABDOMEN AND PELVIS WITH IV CONTRAST      HISTORY: 77-year-old male with abnormal appearance of the portal vein on  a noncontrasted CT of the abdomen and pelvis performed earlier today.      TECHNIQUE: 3 mm images were obtained through the abdomen and pelvis  after the administration of IV contrast. Compared with the noncontrasted  CT abdomen and pelvis performed earlier today.      FINDINGS: There is extensive acute thrombosis of the main portal vein,  right and left portal veins, and nearly the entire splenic vein. The SMV  is patent. The spleen is enlarged and there are 3 small wedge-shaped  regions of decreased enhancement. There is a heterogeneous enhancement  pattern throughout the central aspect of the liver. There is a tiny  amount of perihepatic and perisplenic ascites and there is a small  amount of free fluid within the dependent aspect of the pelvis. No acute  abnormality is seen involving the pancreas, adrenals, or kidneys. Single  nonobstructing left renal stone is noted. No acute bowel abnormality is  seen. Prostatic brachytherapy seeds again noted.      IMPRESSION:  1. Extensive acute thrombosis of the portal vein, right and left  intrahepatic portal veins, and there is extensive acute thrombosis of  nearly the entire splenic vein. The SMV is patent.  2. Development of splenomegaly and there are 3 small evolving  splenic  infarctions. Very small volume of free fluid within the abdomen and  pelvis.      Discussed with Dr. Siddiqui.      AFP Tumor Marker   Order: [id number]   Status:  Final result   Visible to patient:  No (Not Released)      Ref Range & Units 2d ago     AFP Tumor Marker 0.0 - 8.3 ng/mL 1.2   Comments: Roche ECLIA methodology   Resulting Agency  [facility]   Narrative   Performed at:  [address] [place] [address]  [id number]  : Robel Del Valle PhD, Phone:  [phone]      Specimen Collected: 07/04/17 11:24 AM Last Resulted: 07/05/17                Cancer Antigen 19-9   Order: [id number]   Status:  Final result   Visible to patient:  No (Not Released)      Ref Range & Units 2d ago     CA 19-9 0 - 35 U/mL 16   Comments: Roche ECLIA methodology   Resulting Agency  [facility]   Narrative   Performed at:  [address] [place] [address]  [id number]  : Robel Del Valle PhD, Phone:  [phone]      Specimen Collected: 07/04/17 11:24 AM Last Resulted: 07/05/17  5:09 PM              Cardiolipin Antibody, IgA   Order: [id number]   Status:  Final result   Visible to patient:  No (Not Released)      Ref Range & Units 2d ago     Anticardiolipin IgA 0 - 11 APL U/mL <9   Comments:                           Negative:              <12                             Indeterminate:     12 - 20                             Low-Med Positive: >20 - 80                             High Positive:         >80   Resulting Agency  [facility]   Narrative   Performed at:  [address] [place] [address]  [id number]  : Robel Del Valle PhD, Phone:  [phone]      Specimen Collected: 07/04/17 11:24 AM Last Resulted: 07/05/17                  Hematology WBC   Date Value Ref Range Status   07/06/2017 10.83 (H) 4.50 - 10.70 10*3/mm3 Final     RBC   Date Value Ref Range Status   07/06/2017 5.02 4.60 - 6.00 10*6/mm3 Final     Hemoglobin   Date Value Ref Range Status    07/06/2017 13.3 (L) 13.7 - 17.6 g/dL Final     Hematocrit   Date Value Ref Range Status   07/06/2017 40.9 40.4 - 52.2 % Final     Platelets   Date Value Ref Range Status   07/06/2017 163 140 - 500 10*3/mm3 Final      Assessment/Plan this patient has thrombophilia associated with FACTOR V Leiden mutation.  He has had extensive thrombosis in the failure in lower extremities he has had sagittal vein thrombosis he has had a chronic anticoagulation initiated with Coumadin and subsequently with a Xarelto.  The patient has been taking this Xarelto religiously according to the medical records are documented in the emergency room.  However submitted most is the fact that the patient has had abdominal pain subsided significantly for the last 2-3 months he has lost his appetite and he has lost 20 pounds in weight.  They and the patient has had in March of this year reassessment of previous malignancy specifically lung cancer having no evidence for recurrence.  On the thrombosis of the portal vein is an entity that can be associated with several malignancies in the gastrointestinal tract including pancreatic cancer as well as liver cancer.  The patient has developed a significant anemia related to iron deficiency and he was treated subsequently for these by  this incident the summer of 2016.  Therefore medically to 1 that he will be a seen is normally they thrombophilia associated with  Leiden mutation and also thrombophilia associated with some sort of malignancy.  Even though the radiological assessment of his CT scan shows nothing to suggest a hepatocellular carcinoma or a pancreatic cancer and makes it under reasonably to proceed with tumor markers including a CA 19-9 as well as CEA and also the alpha-fetoprotein.    Given the history of prostate cancer and going to go ahead and proceed per PSA.  I'm going to repeat his thrombophilia labs including lupus anticoagulant and anticardiolipin antibody profile along  with a C-reactive protein.  Thrombophilia CAN HAPPEN IN patient WITH Monoclonal protein.  For this reason I'm going to proceed with a serum protein ELECTROPHORESIS.  From the point of your treatment the patient is going to require continuation of the brain and eventually initiation of transition with Coumadin.  Coumadin will require to be follow-up in the office in the future down going to go ahead and make appointments to follow-up with Dr. JHAVERI the close future for pro times and so forth.    Eventually we'll continue related to repeat the CT scan of the abdomen to see if there resolution of the portal vein thrombosis and splenic vein thromboses and to see what happens with his enlarged spleen that is a congestive form of splenomegaly.  Finally the patient is stated that has not been lOng since he had a  colonoscopy.  Again the fact that he developed IRON deficiency anemia on blood thinner medication makes it to WONDER FOR source of  malignancy the gastrointestinal tract.    Discussed with dr LEMOS: looking for another reason of thrombophilia CEA and PSA normal, AFP and ca 19-9 pending, other labs pending; we agree into endoscopies at this point he has hemo positive stools, and on anticoagulants we need to know the source of bleeding.He will consult GI. Discussed with pt wife and daughter in detail.  On 7/1  7 the patient have to say it is felt today well he has minimal if any abdominal pain and he is ready to proceed today with his upper GI endoscopy.  His daughter has made the statement that the patient behavior has changed during the last say in several weeks to months his more aggressive his forgetful.  He repetitions and she is worried that he has had NEW strokes.  And given the fact that he will have an endoscopy today I would proceed with an MRI scan of the brain tomorrow for to review the anatomy and see if indeed he has hadVascular events  Tomorrow we will may the transition from IV hip.  Subcutaneous  Lovenox and subsequently the patient will be placed on Coumadin and he wishes will be glad to follow-up his pro time in the office in the close future.  This could be arranged by tomorrow.  We will review again his MRI tomorrow.  Review the laboratory parameters as stated above including a normal 50 to alpha-fetoprotein, normal CA 19-9, negative anticardiolipin antibody profile, therefore so far we have not found any other reason for thrombophilia the is a able to explain why he is having all the other problem is that he passed.  Obviously the upper GI endoscopy will be able to tell last something else say if anything else is happening in the gastrointestinal tract.  The patient had a colonoscopy not too long ago and there is no going to be another one done at this time.

## 2017-07-06 NOTE — ANESTHESIA PREPROCEDURE EVALUATION
Anesthesia Evaluation     Patient summary reviewed          Airway   Mallampati: II  TM distance: >3 FB  Neck ROM: full  no difficulty expected  Dental    (+) upper dentures    Pulmonary     breath sounds clear to auscultation  Cardiovascular   Exercise tolerance: poor (<4 METS)    Rhythm: regular  Rate: normal        Neuro/Psych  GI/Hepatic/Renal/Endo      Musculoskeletal     Abdominal    Substance History      OB/GYN          Other                                        Anesthesia Plan    ASA 3     MAC     intravenous induction   Anesthetic plan and risks discussed with patient.

## 2017-07-06 NOTE — PLAN OF CARE
Problem: Patient Care Overview (Adult)  Goal: Plan of Care Review  Outcome: Ongoing (interventions implemented as appropriate)    07/04/17 1924 07/05/17 1640 07/06/17 0209   Coping/Psychosocial Response Interventions   Plan Of Care Reviewed With --  --  patient   Patient Care Overview   Progress no change --  --    Outcome Evaluation   Outcome Summary/Follow up Plan --  Pt PTT is therapeutic am PTT ordered. GI consult today for heme positive stool. vascular sugery signed off. no complaints from patient today. oriented with periods of confusion.  --        Goal: Adult Individualization and Mutuality  Outcome: Ongoing (interventions implemented as appropriate)  Goal: Discharge Needs Assessment  Outcome: Ongoing (interventions implemented as appropriate)    Problem: Skin Integrity Impairment, Risk/Actual (Adult)  Goal: Identify Related Risk Factors and Signs and Symptoms  Outcome: Ongoing (interventions implemented as appropriate)  Goal: Skin Integrity/Wound Healing  Outcome: Ongoing (interventions implemented as appropriate)    Problem: Fall Risk (Adult)  Goal: Identify Related Risk Factors and Signs and Symptoms  Outcome: Ongoing (interventions implemented as appropriate)  Goal: Absence of Falls  Outcome: Ongoing (interventions implemented as appropriate)    Problem: Diabetes, Type 2 (Adult)  Goal: Signs and Symptoms of Listed Potential Problems Will be Absent or Manageable (Diabetes, Type 2)  Outcome: Ongoing (interventions implemented as appropriate)

## 2017-07-06 NOTE — PROGRESS NOTES
History:    This is a 77-year-old gentleman.  He has portal vein thrombosis.  He has factor V Leiden deficiency.  He is scheduled to get an EGD later today.    The patient is doing good.  He has no complaints.  He has had mild confusion.  His blood sugars have been elevated.    Allergies  Review of patient's allergies indicates no known allergies.      Current Facility-Administered Medications:   •  dextrose (D50W) solution 25 g, 25 g, Intravenous, Q15 Min PRN, Landry Ho MD  •  dextrose (D50W) solution 25 g, 25 g, Intravenous, Q15 Min PRN, Ceferino Gillespie MD  •  dextrose (GLUTOSE) oral gel 15 g, 15 g, Oral, Q15 Min PRN, Landry Ho MD  •  dextrose (GLUTOSE) oral gel 15 g, 15 g, Oral, Q15 Min PRN, Ceferino Gillespie MD  •  donepezil (ARICEPT) tablet 5 mg, 5 mg, Oral, Nightly, Ceferino Gillespie MD, 5 mg at 07/05/17 2051  •  glucagon (human recombinant) (GLUCAGEN DIAGNOSTIC) injection 1 mg, 1 mg, Subcutaneous, Q15 Min PRN, Landry Ho MD  •  glucagon (human recombinant) (GLUCAGEN DIAGNOSTIC) injection 1 mg, 1 mg, Subcutaneous, Q15 Min PRN, Ceferino Gillespie MD  •  heparin (porcine) 5000 UNIT/ML injection 2,900-5,900 Units, 40-80 Units/kg, Intravenous, Q6H PRN, Sher Siddiqui MD  •  heparin 91174 units/250 mL (100 units/mL) in 0.45 % NaCl infusion, 18 Units/kg/hr, Intravenous, Titrated, Sher Siddiqui MD, Last Rate: 11.02 mL/hr at 07/05/17 1350, 15 Units/kg/hr at 07/05/17 1350  •  HYDROcodone-acetaminophen (NORCO) 5-325 MG per tablet 1 tablet, 1 tablet, Oral, Q4H PRN, Ceferino Gillespie MD, 1 tablet at 07/04/17 0130  •  insulin aspart (novoLOG) injection 0-9 Units, 0-9 Units, Subcutaneous, 4x Daily With Meals & Nightly, Ceferino Gillespie MD, 4 Units at 07/05/17 2055  •  insulin detemir (LEVEMIR) injection 30 Units, 30 Units, Subcutaneous, Nightly, Ceferino Gillespie MD, 30 Units at 07/05/17 2050  •  levETIRAcetam (KEPPRA) tablet 500 mg, 500 mg, Oral, BID, Ceferino Gillespie MD, 500 mg at 07/05/17 1805  •  morphine  "injection 1 mg, 1 mg, Intravenous, Q4H PRN **AND** naloxone (NARCAN) injection 0.4 mg, 0.4 mg, Intravenous, Q5 Min PRN, Ceferino Gillespie MD  •  oxybutynin (DITROPAN) tablet 5 mg, 5 mg, Oral, Daily, Ceferino Gillespie MD, 5 mg at 07/05/17 0938  •  pantoprazole (PROTONIX) EC tablet 40 mg, 40 mg, Oral, Q AM, Ceferino Gillespie MD, 40 mg at 07/05/17 0608  •  sodium chloride 0.9 % flush 1-10 mL, 1-10 mL, Intravenous, PRN, Ceferino Gillespie MD  •  sodium chloride 0.9 % flush 10 mL, 10 mL, Intravenous, PRN, Sher Siddiqui MD  •  traZODone (DESYREL) tablet 50 mg, 50 mg, Oral, Nightly, Ceferino Gillespie MD, 50 mg at 07/05/17 2051  •  valsartan (DIOVAN) tablet 160 mg, 160 mg, Oral, Q24H, Ceferino Gillespie MD, 160 mg at 07/05/17 0938    /86 (BP Location: Left arm, Patient Position: Lying)  Pulse 66  Temp 98.8 °F (37.1 °C) (Oral)   Resp 16  Ht 69\" (175.3 cm)  Wt 158 lb 12.8 oz (72 kg)  SpO2 92%  BMI 23.45 kg/m2    Physical Exam     Appearance: Normally developed and nourished 77-year-old gentleman.  He is in no distress.     HEENT: Normocephalic atraumatic.  Pupils round and equal.  Pharynx clear and mucous murmurs moist.     Neck: Without adenopathy JVD or thyromegaly.     Lungs: Clear to auscultation percussion.     Heart: Regular rate and rhythm.     Abdomen: Normal active bowel sounds.  No rebound test or guarding.  Spleen was palpable.     Extremities: No calf tenderness.  No clubbing, cyanosis or edema.     Neurologic: Nonfocal.  No confusion this morning.  Nonfocal.  No asterixis      Lab Results (last 24 hours)     Procedure Component Value Units Date/Time    aPTT [[id number]]  (Abnormal) Collected:  07/05/17 0854    Specimen:  Blood Updated:  07/05/17 0945     PTT 79.2 (H) seconds     POC Glucose Fingerstick [[id number]]  (Abnormal) Collected:  07/05/17 1131    Specimen:  Blood Updated:  07/05/17 1133     Glucose 181 (H) mg/dL     Narrative:       Meter: [id number] : [id number] Ben Desouza    POC Glucose Fingerstick " [[id number]]  (Abnormal) Collected:  07/05/17 1642    Specimen:  Blood Updated:  07/05/17 1644     Glucose 297 (H) mg/dL     Narrative:       Meter: [id number] : [id number] Ben Lyly    Cancer Antigen 19-9 [[id number]] Collected:  07/04/17 1124    Specimen:  Blood Updated:  07/05/17 1709     CA 19-9 16 U/mL       Roche ECLIA methodology       Narrative:       Performed at:  [address] [place] [address]  [id number]  : Robel Del Valle PhD, Phone:  [facility]    AFP Tumor Marker [[id number]] Collected:  07/04/17 1124    Specimen:  Blood Updated:  07/05/17 1709     AFP Tumor Marker 1.2 ng/mL       Roche ECLIA methodology       Narrative:       Performed at:  [address] [place] [address]  [id number]  : Robel Del Valle PhD, Phone:  [facility]    Cardiolipin Antibody, IgA [[id number]] Collected:  07/04/17 1124    Specimen:  Blood Updated:  07/05/17 1709     Anticardiolipin IgA <9 APL U/mL                                 Negative:              <12                            Indeterminate:     12 - 20                            Low-Med Positive: >20 - 80                            High Positive:         >80       Narrative:       Performed at:  [address] [place] [address]  [id number]  : Robel Del Valle PhD, Phone:  [phone]    POC Glucose Fingerstick [[id number]]  (Abnormal) Collected:  07/05/17 2050    Specimen:  Blood Updated:  07/05/17 2104     Glucose 246 (H) mg/dL     Narrative:       Meter: [id number] : [id number] Michelle Villagomez    CBC & Differential [[id number]] Collected:  07/06/17 0534    Specimen:  Blood Updated:  07/06/17 0636    Narrative:       The following orders were created for panel order CBC & Differential.  Procedure                               Abnormality         Status                     ---------                               -----------         ------                     Scan  Slide[[id number]]                                       Final result               CBC Auto Differential[[id number]]        Abnormal            Final result                 Please view results for these tests on the individual orders.    CBC Auto Differential [[id number]]  (Abnormal) Collected:  07/06/17 0534    Specimen:  Blood Updated:  07/06/17 0636     WBC 10.83 (H) 10*3/mm3      RBC 5.02 10*6/mm3      Hemoglobin 13.3 (L) g/dL      Hematocrit 40.9 %      MCV 81.5 fL      MCH 26.5 (L) pg      MCHC 32.5 (L) g/dL      RDW 15.6 (H) %      RDW-SD 46.2 fl      Platelets 163 10*3/mm3      Neutrophil % 76.3 (H) %      Lymphocyte % 7.5 (L) %      Monocyte % 13.2 (H) %      Eosinophil % 1.6 %      Basophil % 0.6 %      Immature Grans % 0.8 (H) %      Neutrophils, Absolute 8.26 (H) 10*3/mm3      Lymphocytes, Absolute 0.81 (L) 10*3/mm3      Monocytes, Absolute 1.43 (H) 10*3/mm3      Eosinophils, Absolute 0.17 10*3/mm3      Basophils, Absolute 0.07 10*3/mm3      Immature Grans, Absolute 0.09 (H) 10*3/mm3      nRBC 0.0 /100 WBC     Scan Slide [[id number]] Collected:  07/06/17 0534    Specimen:  Blood Updated:  07/06/17 0636    aPTT [[id number]]  (Abnormal) Collected:  07/06/17 0533    Specimen:  Blood Updated:  07/06/17 0637     PTT 64.5 (H) seconds     POC Glucose Fingerstick [[id number]]  (Normal) Collected:  07/06/17 0734    Specimen:  Blood Updated:  07/06/17 0736     Glucose 87 mg/dL     Narrative:       Meter: [id number] : [id number] Jagdeep Hutton          Imp:       1.  Thrombophilia with portal vein thrombosis.  The patient has factor V Leiden.  He has a history of both adenocarcinoma of the lung and prostate cancer.  Underlying malignancy is a definite possibility.     2.  Type 2 diabetes mellitus.  Blood sugars are up and down    3.  Mild confusion.     4.  Essential hypertension.     5.  Hyperlipidemia.     6.  History of and no CVA of the lung and prostate cancer.     7.  Stool heme positive on admission.  He has  evidence of iron deficiency.     8.  Chronic renal insufficiency.  Stage III B      Plan:    I'm going to schedule an EEG.    We will check CMP and ammonia today.    Continue to manage glucose.    The patient scheduled for an EGD later today.    He'll likely be started on Coumadin later today.    Ceferino Gillespie MD  7/6/2017  7:43 AM

## 2017-07-06 NOTE — PLAN OF CARE
Problem: Patient Care Overview (Adult)  Goal: Plan of Care Review    07/06/17 1151   Outcome Evaluation   Outcome Summary/Follow up Plan Pt ambulated 150 ft CGA using Rwx. Cues required to keep walker closer. Slightly unsteady, but no LOB.

## 2017-07-06 NOTE — BRIEF OP NOTE
ESOPHAGOGASTRODUODENOSCOPY  Procedure Note    Alfredo TRIPLETT Zeferinomaria del [place]  7/3/2017 - 7/6/2017    Pre-op Diagnosis:   Portal vein thrombosis [I81]  Iron deficiency anemia, unspecified iron deficiency anemia type [D50.9]    Post-op Diagnosis:     Post-Op Diagnosis Codes:     * Portal vein thrombosis [I81]     * Iron deficiency anemia, unspecified iron deficiency anemia type [D50.9]     * Esophageal and gastric varices [I85.00, I86.4]     * Gastritis [K29.70]     * History of duodenal ulcer [Z87.19]    Procedure/CPT® Codes:      Procedure(s):  ESOPHAGOGASTRODUODENOSCOPY to jejunum    Surgeon(s):  Tim Xie MD    Anesthesia: Monitor Anesthesia Care    Staff:   Endo Technician: Marcela Jensen  Endo Nurse: Yadira Lassiter RN    Estimated Blood Loss: 0 mL  Urine Voided: * No values recorded between 7/6/2017  1:57 PM and 7/6/2017  2:16 PM *    Specimens:                * No specimens in log *      Drains:           Findings: Healed Duodenal ulcers  Mild -Mod Gastritis  Gastric varicies-II  Esophageal Varicies-II      Complications: none    Recommendations:  Resume diet , prophylactic B-Blocker        Tim Xie MD     Date: 7/6/2017  Time: 2:19 PM

## 2017-07-06 NOTE — THERAPY TREATMENT NOTE
Acute Care - Physical Therapy Treatment Note  [facility]     Patient Name: Alfredo Gonzalez  : 1940  MRN: [id number]  Today's Date: 2017  Onset of Illness/Injury or Date of Surgery Date: 17     Referring Physician: Verna    Admit Date: 7/3/2017    Visit Dx:    ICD-10-CM ICD-9-CM   1. Acute venous thrombosis I82.90 453.9   2. Generalized abdominal pain R10.84 789.07   3. General weakness R53.1 780.79   4. Portal vein thrombosis I81 452   5. Iron deficiency anemia, unspecified iron deficiency anemia type D50.9 280.9     Patient Active Problem List   Diagnosis   • Non-small cell lung cancer   • Chest pain   • Chronic kidney disease (CKD)   • Microcytic hypochromic anemia   • History of prostate cancer   • Anemia, B12 deficiency   • Iron deficiency anemia   • Pernicious anemia   • Portal vein thrombosis   • Portal vein thrombosis               Adult Rehabilitation Note       17 1149          Rehab Assessment/Intervention    Discipline physical therapist  -      Document Type therapy note (daily note)  -      Subjective Information agree to therapy;no complaints  -      Patient Effort, Rehab Treatment good  -      Symptoms Noted During/After Treatment none  -ELY      Precautions/Limitations fall precautions  -KH      Recorded by [] Yamel Hodgson, PT      Pain Assessment    Pain Assessment No/denies pain  -KH      Recorded by [KH] Yamel Hodgson, PT      Bed Mobility, Assessment/Treatment    Bed Mobility, Assistive Device bed rails  -KH      Bed Mob, Supine to Sit, [place] conditional independence  -KH      Bed Mob, Sit to Supine, [place] conditional independence  -KH      Recorded by [] Yamel Hodgson, PT      Transfer Assessment/Treatment    Transfers, Sit-Stand [place] contact guard assist  -ELY      Transfers, Stand-Sit [place] contact guard assist  -KH      Transfers, Sit-Stand-Sit, Assist Device rolling walker  -KH      Recorded by []  Yamel Hodgson, PT      Gait Assessment/Treatment    Gait, [place] Level contact guard assist  -KH      Gait, Assistive Device rolling walker  -KH      Gait, Distance (Feet) 150  -KH      Gait, Gait Deviations jake decreased;forward flexed posture;step length decreased  -      Gait, Comment cues to keep walker closer  -KH      Recorded by [ELY] Yamel Hodgson, PT      Therapy Exercises    Bilateral Lower Extremities AROM:;10 reps;ankle pumps/circles;hip flexion;LAQ  -KH      Recorded by [ELY] Yamel Hodgson, PT      Positioning and Restraints    Pre-Treatment Position in bed  -KH      Post Treatment Position bed  -KH      In Bed supine;call light within reach;encouraged to call for assist;exit alarm on;side rails up x3;with nsg;with family/caregiver  -KH      Recorded by [ELY] Yamel Hodgson, PT        User Key  (r) = Recorded By, (t) = Taken By, (c) = Cosigned By    Initials Name Effective Dates     Yamel Hodgson, PT 05/18/15 -                 IP PT Goals       07/05/17 1150          Transfer Training PT LTG    Transfer Training PT LTG, Date Established 07/05/17  -EE (r) ELICEO (t) EE (c)      Transfer Training PT LTG, Time to Achieve 1 wk  -EE (r) ELICEO (t) EE (c)      Transfer Training PT LTG, Activity Type all transfers  -EE (r) ELICEO (t) EE (c)      Transfer Training PT LTG, [place] Level supervision required  -EE (r) ELICEO (t) EE (c)      Transfer Training PT LTG, Assist Device walker, rolling  -EE (r) ELICEO (t) EE (c)      Gait Training PT LTG    Gait Training Goal PT LTG, Date Established 07/05/17  -EE (r) ELICEO (t) EE (c)      Gait Training Goal PT LTG, Time to Achieve 1 wk  -EE (r) ELICEO (t) EE (c)      Gait Training Goal PT LTG, [place] Level supervision required  -EE (r) ELICEO (t) EE (c)      Gait Training Goal PT LTG, Assist Device walker, rolling  -EE (r) ELICEO (t) EE (c)      Gait Training Goal PT LTG, Distance to Achieve 300  -EE (r) ELICEO (t) EE (c)      Stair Training  PT LTG    Stair Training Goal PT LTG, Date Established 07/05/17  -EE (r) ELICEO (t) EE (c)      Stair Training Goal PT LTG, Time to Achieve 1 wk  -EE (r) ELICEO (t) EE (c)      Stair Training Goal PT LTG, Number of Steps 5  -EE (r) ELICEO (t) EE (c)      Stair Training Goal PT LTG, [place] Level contact guard assist  -EE (r) ELICEO (t) EE (c)      Stair Training Goal PT LTG, Assist Device 1 handrail  -EE (r) ELICEO (t) EE (c)        User Key  (r) = Recorded By, (t) = Taken By, (c) = Cosigned By    Initials Name Provider Type    EE Nida Mitchell, PT Physical Therapist    ELICEO Leighton Santacruz, PT Student PT Student          Physical Therapy Education     Title: PT OT SLP Therapies (Done)     Topic: Physical Therapy (Done)     Point: Mobility training (Done)    Learning Progress Summary    Learner Readiness Method Response Comment Documented by Status   Patient Acceptance E VU,NR   07/06/17 1151 Done    Acceptance E VU,[facility] 07/05/17 1149 Done   Family Acceptance E VU,[facility] 07/05/17 1149 Done               Point: Home exercise program (Done)    Learning Progress Summary    Learner Readiness Method Response Comment Documented by Status   Patient Acceptance E VU,NR   07/06/17 1151 Done    Acceptance E VU,NR   07/05/17 1149 Done   Family Acceptance E VU,NR   07/05/17 1149 Done               Point: Body mechanics (Done)    Learning Progress Summary    Learner Readiness Method Response Comment Documented by Status   Patient Acceptance E VU,NR   07/05/17 1149 Done   Family Acceptance E VU,[facility] 07/05/17 1149 Done               Point: Precautions (Done)    Learning Progress Summary    Learner Readiness Method Response Comment Documented by Status   Patient Acceptance E VU,NR   07/05/17 1149 Done   Family Acceptance E VU,NR   07/05/17 1149 Done                      User Key     Initials Effective Dates Name Provider Type Discipline     05/18/15 -  Yamel Hodgson, PT Physical Therapist PT     05/08/17 -  Leighton Santacruz, PT  Student PT Student PT                    PT Recommendation and Plan  Anticipated Equipment Needs At Discharge: front wheeled walker  Anticipated Discharge Disposition: home with home health  Planned Therapy Interventions: balance training, gait training, home exercise program, patient/family education, strengthening  PT Frequency: daily  Plan of Care Review  Outcome Summary/Follow up Plan: Pt ambulated 150 ft CGA using Rwx. Cues required to keep walker closer. Slightly unsteady, but no LOB.          Outcome Measures       07/06/17 1152 07/05/17 1100       How much help from another person do you currently need...    Turning from your back to your side while in flat bed without using bedrails? 4  -KH 4  -EE (r) ELICEO (t) EE (c)     Moving from lying on back to sitting on the side of a flat bed without bedrails? 4  -KH 4  -EE (r) ELICEO (t) EE (c)     Moving to and from a bed to a chair (including a wheelchair)? 3  -KH 3  -EE (r) ELICEO (t) EE (c)     Standing up from a chair using your arms (e.g., wheelchair, bedside chair)? 3  -KH 3  -EE (r) ELICEO (t) EE (c)     Climbing 3-5 steps with a railing? 2  -KH 2  -EE (r) ELICEO (t) EE (c)     To walk in hospital room? 3  -KH 3  -EE (r) ELICEO (t) EE (c)     AM-PAC 6 Clicks Score 19  -KH 19  -EE (r) ELICEO (t)     Functional Assessment    Outcome Measure Options  AM-PAC 6 Clicks Basic Mobility (PT)  -EE (r) ELICEO (t) EE (c)       User Key  (r) = Recorded By, (t) = Taken By, (c) = Cosigned By    Initials Name Provider Type    ELY Hodgson, PT Physical Therapist    JESENIA Mitchell, PT Physical Therapist    ELICEO Santacruz, PT Student PT Student           Time Calculation:         PT Charges       07/06/17 1152          Time Calculation    Start Time 1134  -KH      Stop Time 1145  -KH      Time Calculation (min) 11 min  -KH      PT Received On 07/06/17  -      PT - Next Appointment 07/07/17  -        User Key  (r) = Recorded By, (t) = Taken By, (c) = Cosigned By    Initials Name Provider  Type    KH Yamel Hodgson, PT Physical Therapist          Therapy Charges for Today     Code Description Service Date Service Provider Modifiers Qty    61578758430 HC PT THER PROC EA 15 MIN 7/6/2017 Yamel Hodgson, PT GP 1          PT G-Codes  Outcome Measure Options: AM-PAC 6 Clicks Basic Mobility (PT)    Yamel Hodgson, PT  7/6/2017

## 2017-07-07 NOTE — PROGRESS NOTES
"   LOS: 4 days   Patient Care Team:  Gigi Gillespie Jr., MD as PCP - General  Jean Fajardo MD as Consulting Physician (Hematology and Oncology)  Gigi Gillespie Jr., MD as Referring Physician (Internal Medicine)    Chief Complaint: abdominal pain    Subjective     History of Present Illness    Subjective:  Symptoms:  Improved.    Diet:  Adequate intake.    Activity level: Impaired due to weakness.    Pain:  He reports no pain.      He states his abdomen feels fine, reviewed upper endoscopy findings with him.  No bleeding or diarrhea, feels better.   History taken from: patient chart family    Objective     Vital Signs  Temp:  [98 °F (36.7 °C)-99.1 °F (37.3 °C)] 98.7 °F (37.1 °C)  Heart Rate:  [61-82] 65  Resp:  [11-20] 20  BP: ()/(45-93) 121/72    Objective:  General Appearance:  Ill-appearing.    Vital signs: (most recent): Blood pressure 121/72, pulse 65, temperature 98.7 °F (37.1 °C), temperature source Oral, resp. rate 20, height 69\" (175.3 cm), weight 158 lb 12.8 oz (72 kg), SpO2 93 %.  Vital signs are normal.    Output: Producing stool.    Lungs:  Normal effort.    Heart: Normal rate.    Abdomen: Abdomen is soft.  Bowel sounds are normal.   There is no abdominal tenderness.     Neurological: Patient is alert and oriented to person, place and time.              Results Review:     I reviewed the patient's new clinical results.    Medication Review:     Assessment/Plan     Active Problems:    Portal vein thrombosis    Portal vein thrombosis      Assessment:  (Small healing duodenal ulcers  Small gastroesophageal varices).     Plan:   (Current rx at the present time.   Given the relatively recent colonoscopy, not felt that repeating it will offer significant benefit.  Will follow in a peripheral manner. ).       Erick Addison MD  07/07/17  12:29 PM    Time: Critical care 15 min      "

## 2017-07-07 NOTE — TELEPHONE ENCOUNTER
Message sent to appt desk.     ----- Message from Morgan Navas MD sent at 7/7/2017 11:51 AM EDT -----  He needs weekly cbc protime starting Wednesday next week and coagulation nurse visit once a week and 2 unit angélica kommor in 2 weeks

## 2017-07-07 NOTE — PROGRESS NOTES
Subjective .  Modest improvement in fatigue , worsening foot pain persisting    REASONS FOR FOLLOWUP:    1. Previous assessment per superior sagittal sinus thrombosis, left frontal intracerebral hemorrhage, acute calf vein thrombosis, hypercoagulable assessment positive for heterozygosity for factor V 5 Leiden gene mutation.   2. Ongoing anticoagulation with Coumadin followed by Dr. Gillespie.   3. Admission on 08/04/2014 with severe right knee pain, spontaneous infusion, calf vein thrombosis despite therapeutic anticoagulation.   4. Repeat consultation 08/05/2014 leading to chest CT revealing irregula r nodule in the right apex indeterminate ? malignancy.   5. Thoracic surgery consultation, Neurovascular surgery consultation obtained, IVC filter placed, exploratory right video assisted fluoroscopy with wedge resection right upper lobe lymphadenectomy 08/15/ 2014, pathology consistent with primary lung adenocarcinoma, stage [id number] stage IA, EGFR mutation, positive for Exon 19 mutation, (potentially responsive to EGFR tyrosine kinase therapy).   6. The patient was seen in the office 09/23/2014, adjuvant therapy not felt necessary, IVC filter to be removed, continue Xarelto therapy thereafter recommended  7. Patient reviewed December 21, 2016 with ongoing anemia-microcytic, hypochromic  8. Determination of considerable iron deficiency and B12 deficiency?  Pernicious anemia, replacement therapy is initiated  9. Patient reviewed March 8, considerable improvement per anemia though iron deficiency persists, patient with progressive foot pain, foot lesions developing, follow-up scan obtained  10. Patient reviewed April 3, CT scans negative, anemia resolved, potential lower extremity vasculopathy-dermatologic assessment pursued  11. Patient reviewed May 31, 2017, no significant improvement per lower extremity vasculopathy    HISTORY OF PRESENT ILLNESS:  The patient is a 77 y.o. year old male.  This patient was admitted to  the hospital intensive care unit yesterday with a at least 3 days history of persistent abdominal pain and 67/10 in the periumbilical area and epigastric area continues associated with mild nausea refused they have 3 N1 of dictation.  He has no abdominal distention no fevers no chills no diaphoresis and no retrosternal chest pain.  The time of admission through the emergency room he was found to have significant abnormalities in liver function tests filter assessment documented to a CT scan of the abdomen to the patient had extensive portal vein thrombosis.  He was also noticed that the patient splenomegaly.  Thrombosis of the splenic vein.  Obviously the patient was admitted to the intensive care unit he was placed on heparin and actually his abdominal pain has substantially subsided since then to the point of her solution and this is starting to have a and regular diet today.  The patient states today that the abdominal pain is no new has been ongoing on for at least 2to3 months Mild and intensity progressive to the point that he got yesterday.  Also he has lost his appetite take completely and he has lost 20 pounds of weight in the last 2-3 months.  Again he has not had any fever or chills he doesn't have any cough or sputum production he has not had any shortness of breath.  He denies any changes in his bowel habits.  He denies any hematemesis or melena or interim range up.  He denies any hematuria.  He hasn't had any frequency to urinate and he denies any recent urinary tract infections.  Ibuprofen skin lesions that were documented before were treated by his primary internal medicine physician with resolution.  No new lesions evolving.    History of Past  Illness     Mr. Gonzalez is a 77-year-old male who we had initially seen in consultation 02/04 through 02/14/2002 having presented at that point with a superior sagittal sinus thrombosis and left frontal intracerebral hemorrhage. Evaluation thereafter included a  hypercoagulable assessment when he was found also to have an acute calf vein thrombosis and spontaneous calf vein thrombosis on the left . The patient fortunately responded well to rehabilitation and anticoagulation. He was eventually placed on Coumadin long term and hypercoagulable assessment had revealed findings consistent with heterozygosity for factor V Leiden gene mutation. The patie nt did not have followup with us, though that is not exactly certain why.       Mr. Gonzalez was later admitted 08/04/2014 through Dr. Gillespie. The patient had gone to [employer] and while standing in [employer] he noticed his right leg starting to cramp. He began to have noticeable limping thereafter and then when knee pain became so severe his wife called 911 and he was brought to [facility] emergency room. In the ER he was found to have calf vein thrombosis involving the right leg as well as a swollen k n ee with effusion. He was admitted to be seen by Orthopedics and was found to incidentally have a prothrombin time 3.1 and thus we were asked to see him for his thrombosis despite adequate anticoagulation. At that point, he was seen by this physician and a s a result of there being concern about additional factor such as a malignancy, a CT chest, abdomen and pelvis was obtained 08/05/2014. This revealed unexpectedly an irregular 2.0 x 1.1 cm lesion in the right apical region. This was concerning for a malig n jorge. He also had non-obstructing left renal calculus and extensive lumbar spinal degenerative disease. The patient was seen by Orthopedics again with a knee tap. (Followup is still remaining concerning this). The patient was also seen by Thoracic surgeangely joyner . Please note that Dopplers 08/06/2014 revealed a subacute venous thrombosis in the right popliteal vein as well as subacute thrombus in the right calf vein. The patient was seen by Dr. Meyer per Thoracic surgery and a PET scan was obtained showing the no d ule in  right lung apex to demonstrate low level activity somewhat indeterminate with an SUV of 2.0. No other abnormalities were present. It was felt that the patient should proceed to surgical resection. An IVC filter was placed prior to surgery 08/13/201 4 by Dr. Pastor Trinh. PFTs revealed a moderate airway restriction and after discussion on 08/15/2014 the patient underwent exploratory bronchoscopy, exploratory right video assisted thoracoscopy with wedge resection, right upper lobe and lymphadenectom y . The patient remains hospitalized at this point to have the surgery rather than return for subsequent surgery. Pathology per surgical specimen revealed a primary lung adenocarcinoma, demonstrating a lepidic growth pattern. R4, station 7 and R10 nodes wer e all negative for disease. The tumor size was 2.1 x 1.3 x 1.2 unifocal adenocarcinoma. His pathologic staging was a pT1b pN0M0- stage IA. Now available also at the time of this dictation is the finding that the tumor was EGFR mutation positive with a le s ion positive for E746 [id number] mutation, Exon 19. These are reported to correlate with responsiveness to EGFR tyrosine kinase inhibitor therapies. The patient was seen by Dr. Meyer postoperative and is doing well and is also being seen by Dr. Trinh with consideration of removal of his IVC filter.       The patient now presents back to our practice for review of all of this. We discussed his time line up to this point. In a long discussion it is felt that adjuvant chemotherapy is not warranted in Mr. Gonzalez's care. Reasonably followup is however likely with at least a chest x-ray on a yearly basis possibly through Dr. Gillespie.       The patient's had follow-up through Dr. CHRISTIAN Walker since his last visit here as well as reviews to Dr. Meyer.  This includes evidently a trial of oral iron when the patient was found to be anemic previously as well as recognition of the patient's kidney function-CKD 3.  She also was seen in  emergency room in June of this year after falling from a ladder with laboratory studies revealing anemia with hemoglobin 8.5, hematocrit 30.5 and MCV that Dr. 63.3 with MCH of 17.6 and an MCHC of 27.9, platelet count of 333,000 with normal differential.  In reviewing the patient's blood counts he is definitely developed microcytosis and hypochromia since June and, as such, when immediately as concerned about iron deficiency as an underlying issue.  He also has chronic leukocytosis that appears to be unchanged from previous.  We discussed IV iron as a treatment to address the immediate issue and thereafter try to determine the source of blood loss if indeed iron deficiency is documented.     The patient went on to be treated with both IV Feraheme ×2 on December 23 and December 30 as well as be given injections of B12 both visits.  As he seen back today January 30 he is feeling some better and his wife has also noticed a difference.  We plan to continue the B12 loading over the next several weeks as well as assessing him for pernicious anemia laboratories today.  The patient is now seen back March 08, 2017.  While he's had some improvement in his energy level he still has pain in his feet which has progressed associated with a mildly raised, violaceous group of lesions involving his calf and left foot have developed.  He indicates he is to see a podiatrist in the near future and with the findings of pernicious anemia, ongoing iron deficiency, and unexplained rash developing in the setting of a history of non-small cell lung cancer I requested a PET/CT-?  Paraneoplasia or even new malignant process.    The patient initially was to undergo a PET/CT but this was declined.  A CT of chest and pelvis was obtained with no evidence of metastatic disease identified.  The patient hematologically went on to further improve but has, unfortunately, considerable pain in his lower extremities associated with a rash that has a  partially vasculitic appearance.  The areas in question are erythematous, serpiginous, mildly raised, and very tender to palpation.  He's been seen by podiatry and treated topically without effect.   The patient was thereafter seen evidently by both dermatology and vascular surgery.  Unfortunately he still has significant pain though this is managed in part by current use of pain medications with modest use thus far.  He is encouraged to use this more frequently and plans are to contact Dr. CHRISTIAN Walker as well.    Past Medical History:   Diagnosis Date   • Arthralgia    • Arthritis    • Back pain    • Chronic mixed headache syndrome    • Deep venous thrombosis of right popliteal vein     of right knee hemarthrosis   • Diabetes mellitus    • Diverticulosis    • DJD (degenerative joint disease)    • Factor V Leiden    • GERD (gastroesophageal reflux disease)    • Hypercholesterolemia    • Hypertension    • Intracerebral hemorrhage    • Lumbar spinal stenosis    • Muscular aches    • Neck pain    • Non-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   • Thrombosis     Lower extremity       ONCOLOGIC HISTORY:  (History from previous dates can be found in the separate document.)    No current facility-administered medications on file prior to encounter.      Current Outpatient Prescriptions on File Prior to Encounter   Medication Sig Dispense Refill   • Calcium Carbonate-Vitamin D (CALCIUM-D) 600-400 MG-UNIT tablet Take 1 tablet by mouth daily.     • carvedilol (COREG) 12.5 MG tablet      • cilostazol (PLETAL) 50 MG tablet      • clotrimazole-betamethasone (LOTRISONE) 1-0.05 % cream      • HYDROcodone-acetaminophen (NORCO) 5-325 MG per tablet Take 1 tablet by mouth every 6 (six) hours as needed.     • Insulin Syringe-Needle U-100 (RELION INSULIN SYR  "0.5ML/31G) 31G X 5/16\" 0.5 ML misc      • irbesartan-hydrochlorothiazide (AVALIDE) 300-12.5 MG tablet Take 1 tablet by mouth daily.     • pravastatin (PRAVACHOL) 80 MG tablet Take  by mouth daily.     • rivaroxaban (XARELTO) tablet Take 20 mg by mouth daily with dinner.     • vitamin B-6 (PYRIDOXINE) 100 MG tablet Take 100 mg by mouth.         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     Social History Main Topics   • Smoking status: Former Smoker     Packs/day: 1.00     Years: 20.00     Quit date: 2/8/1996   • Smokeless tobacco: Not on file   • Alcohol use No   • Drug use: No   • Sexual activity: No     Other Topics Concern   • Not on file     Social History Narrative         Cancer-related family history includes Colon cancer in his mother; Lung cancer (age of onset: 77) in his brother.     Review of Systems    General: no fever, chills,significant fatigue,20 lb loss weight changes, and lack of appetite.  Eyes: no epiphora, xerophthalmia,conjunctivitis, pain, glaucoma, blurred vision, blindness, secretion, photophobia, proptosis, diplopia.  Ears: no otorrhea, tinnitus, otorrhagia, deafness, pain, vertigo.  Nose: no rhinorrhea, epistaxis, alteration in perception of odors, sinuses pressure.  Mouth: no alteration in gums or teeth,  ulcers, no difficulty with mastication or deglut ion, no odynophagia.  Neck: no masses or pain, no thyroid alterations, no pain in muscles or arteries, no carotid odynia, no crepitation.  Respiratory: no cough, sputum production, dyspnea, trepopnea, pleuritic pain, hemoptysis.  Heart: no syncope, irregularity, palpitations, angina, orthopnea, paroxysmal nocturnal dyspnea.  Vascular Venous: no " tenderness,edema, palpable cords, postphlebitic syndrome, skin changes or ulcerations.  Vascular Arterial: no distal ischemia, claudication, gangrene, neuropathic ischemic pain, skin ulcers, paleness or cyanosis.  GI: no dysphagia, odynophagia, no regurgitation, no heartburn,no indigestion,no nausea,no vomiting,no hematemesis ,no melena,no jaundice,no distention, no obstipation,no enterorrhagia,no proctalgia,no anal  lesions, nochanges in bowel habits. Abdominal pain as stated  : no frequency, hesitancy, hematuria, discharge, pain.  Musculoskeletal: no muscle or tendon pain or inflammation, joint pain, edema, functional limitation, fasciculations, mass.  Neurologic: no headache, seizures, alterations on Craneal nerves, no motor or senssory deficit, normal coordination, no alteration in memory, orientation, calculation,writting, verbal or written language.  Skin: no rashes, pruritus or localized lesions.  Psychiatric: no anxiety, depression, agitation, delusions, proper insight.        A comprehensive 14 point review of systems was performed and was negative except as mentioned.    Objective      Vitals:    07/06/17 1920 07/07/17 0007 07/07/17 0743 07/07/17 1053   BP: 133/82 142/83 144/86 121/72   BP Location: Left arm Right arm Left arm    Patient Position: Lying Lying Lying    Pulse: 82 66 65    Resp: 18 20 20    Temp: 98.7 °F (37.1 °C) 99.1 °F (37.3 °C) 98.7 °F (37.1 °C)    TempSrc: Oral Oral Oral    SpO2: 93% 94% 93%    Weight:       Height:         Current Status 5/31/2017   ECOG score 1       Physical Exam    GENERAL: Well-developed, well-nourished male in no acute distress.   SKIN: Warm, dry,healed lesions in le  HEAD: Normocephalic.   EYES: Pupils equal, round and reactive to light. EOMs intact. Conjunctivae normal.   EARS: Hearing intact.   NOSE: Septum midline. No excoriations or nasal discharge.   MOUTH: Tongue is well papillated; no stomatitis or ulcers. Lips normal.   THROAT: Oropharynx without lesions  or exudates.   NECK: Supple with good range of motion; no thyromegaly or masses, no JVD or bruits.   LYMPHATICS: No cervical, supraclavicular, axillary or inguinal adenopathy.   CHEST: Decreased breath sounds bilateral bases.   CARDIAC: Status post right video assisted thoracoscopy with healed incision sites.   ABDOMEN: Soft, nontender with no organomegaly or masses. No abdominal pain, palpable spleen 3 cm blcm  EXTREMITIES: Without palpable cords or positive Lucius's signs No clubbing, cyanosis or edema.   NEURO: No focal neurological deficits.         RECENT LABS:CT ABDOMEN AND PELVIS WITH IV CONTRAST      HISTORY: 77-year-old male with abnormal appearance of the portal vein on  a noncontrasted CT of the abdomen and pelvis performed earlier today.      TECHNIQUE: 3 mm images were obtained through the abdomen and pelvis  after the administration of IV contrast. Compared with the noncontrasted  CT abdomen and pelvis performed earlier today.      FINDINGS: There is extensive acute thrombosis of the main portal vein,  right and left portal veins, and nearly the entire splenic vein. The SMV  is patent. The spleen is enlarged and there are 3 small wedge-shaped  regions of decreased enhancement. There is a heterogeneous enhancement  pattern throughout the central aspect of the liver. There is a tiny  amount of perihepatic and perisplenic ascites and there is a small  amount of free fluid within the dependent aspect of the pelvis. No acute  abnormality is seen involving the pancreas, adrenals, or kidneys. Single  nonobstructing left renal stone is noted. No acute bowel abnormality is  seen. Prostatic brachytherapy seeds again noted.      IMPRESSION:  1. Extensive acute thrombosis of the portal vein, right and left  intrahepatic portal veins, and there is extensive acute thrombosis of  nearly the entire splenic vein. The SMV is patent.  2. Development of splenomegaly and there are 3 small evolving splenic  infarctions. Very  small volume of free fluid within the abdomen and  pelvis.      Discussed with Dr. Siddiqui.      AFP Tumor Marker   Order: [id number]   Status:  Final result   Visible to patient:  No (Not Released)      Ref Range & Units 2d ago     AFP Tumor Marker 0.0 - 8.3 ng/mL 1.2   Comments: Roche ECLIA methodology   Resulting Agency  [facility]   Narrative   Performed at:  [address] [place] [address]  [id number]  : Robel Del Valle PhD, Phone:  [phone]      Specimen Collected: 07/04/17 11:24 AM Last Resulted: 07/05/17                Cancer Antigen 19-9   Order: [id number]   Status:  Final result   Visible to patient:  No (Not Released)      Ref Range & Units 2d ago     CA 19-9 0 - 35 U/mL 16   Comments: Roche ECLIA methodology   Resulting Agency  [facility]   Narrative   Performed at:  [address] [place] [address]  [id number]  : Robel Del Valle PhD, Phone:  [phone]      Specimen Collected: 07/04/17 11:24 AM Last Resulted: 07/05/17  5:09 PM              Cardiolipin Antibody, IgA   Order: [id number]   Status:  Final result   Visible to patient:  No (Not Released)      Ref Range & Units 2d ago     Anticardiolipin IgA 0 - 11 APL U/mL <9   Comments:                           Negative:              <12                             Indeterminate:     12 - 20                             Low-Med Positive: >20 - 80                             High Positive:         >80   Resulting Agency  [facility]   Narrative   Performed at:  [address] [place] [address]  [id number]  : Robel Del Valle PhD, Phone:  [phone]      Specimen Collected: 07/04/17 11:24 AM Last Resulted: 07/05/17                Findings:upper gi endosocpy  Diffuse mild inflammation characterized by erythema was found in the entire examined stomach.  Type 2 gastroesophageal varices (GOV2, esophageal varices which extend along the fundus) with no bleeding were  found in the gastric  fundus.  Grade II varices were found in the lower third of the esophagus    Hematology WBC   Date Value Ref Range Status   07/07/2017 11.50 (H) 4.50 - 10.70 10*3/mm3 Final     RBC   Date Value Ref Range Status   07/07/2017 5.05 4.60 - 6.00 10*6/mm3 Final     Hemoglobin   Date Value Ref Range Status   07/07/2017 13.2 (L) 13.7 - 17.6 g/dL Final     Hematocrit   Date Value Ref Range Status   07/07/2017 41.3 40.4 - 52.2 % Final     Platelets   Date Value Ref Range Status   07/07/2017 186 140 - 500 10*3/mm3 Final      Assessment/Plan this patient has thrombophilia associated with FACTOR V Leiden mutation.  He has had extensive thrombosis in the failure in lower extremities he has had sagittal vein thrombosis he has had a chronic anticoagulation initiated with Coumadin and subsequently with a Xarelto.  The patient has been taking this Xarelto religiously according to the medical records are documented in the emergency room.  However submitted most is the fact that the patient has had abdominal pain subsided significantly for the last 2-3 months he has lost his appetite and he has lost 20 pounds in weight.  They and the patient has had in March of this year reassessment of previous malignancy specifically lung cancer having no evidence for recurrence.  On the thrombosis of the portal vein is an entity that can be associated with several malignancies in the gastrointestinal tract including pancreatic cancer as well as liver cancer.  The patient has developed a significant anemia related to iron deficiency and he was treated subsequently for these by  this incident the summer of 2016.  Therefore medically to 1 that he will be a seen is normally they thrombophilia associated with  Leiden mutation and also thrombophilia associated with some sort of malignancy.  Even though the radiological assessment of his CT scan shows nothing to suggest a hepatocellular carcinoma or a pancreatic cancer and makes it under reasonably  to proceed with tumor markers including a CA 19-9 as well as CEA and also the alpha-fetoprotein.    Given the history of prostate cancer and going to go ahead and proceed per PSA.  I'm going to repeat his thrombophilia labs including lupus anticoagulant and anticardiolipin antibody profile along with a C-reactive protein.  Thrombophilia CAN HAPPEN IN patient WITH Monoclonal protein.  For this reason I'm going to proceed with a serum protein ELECTROPHORESIS.  From the point of your treatment the patient is going to require continuation of the brain and eventually initiation of transition with Coumadin.  Coumadin will require to be follow-up in the office in the future down going to go ahead and make appointments to follow-up with Dr. JHAVERI the close future for pro times and so forth.    Eventually we'll continue related to repeat the CT scan of the abdomen to see if there resolution of the portal vein thrombosis and splenic vein thromboses and to see what happens with his enlarged spleen that is a congestive form of splenomegaly.  Finally the patient is stated that has not been lOng since he had a  colonoscopy.  Again the fact that he developed IRON deficiency anemia on blood thinner medication makes it to WONDER FOR source of  malignancy the gastrointestinal tract.    Discussed with dr LEMOS: looking for another reason of thrombophilia CEA and PSA normal, AFP and ca 19-9 pending, other labs pending; we agree into endoscopies at this point he has hemo positive stools, and on anticoagulants we need to know the source of bleeding.He will consult GI. Discussed with pt wife and daughter in detail.  On 7/1  7 the patient have to say it is felt today well he has minimal if any abdominal pain and he is ready to proceed today with his upper GI endoscopy.  His daughter has made the statement that the patient behavior has changed during the last say in several weeks to months his more aggressive his forgetful.  He repetitions  and she is worried that he has had NEW strokes.  And given the fact that he will have an endoscopy today I would proceed with an MRI scan of the brain tomorrow for to review the anatomy and see if indeed he has hadVascular events  Tomorrow we will may the transition from IV hip.  Subcutaneous Lovenox and subsequently the patient will be placed on Coumadin and he wishes will be glad to follow-up his pro time in the office in the close future.  This could be arranged by tomorrow.  We will review again his MRI tomorrow.  Review the laboratory parameters as stated above including a normal 50 to alpha-fetoprotein, normal CA 19-9, negative anticardiolipin antibody profile, therefore so far we have not found any other reason for thrombophilia the is a able to explain why he is having all the other problem is that he passed.  Obviously the upper GI endoscopy will be able to tell last something else say if anything else is happening in the gastrointestinal tract.  The patient had a colonoscopy not too long ago and there is no going to be another one done at this time.I saw his endoscopy report , high risk for anticoagulation and bleeding , he will need weekly cbc pro time and MD visit in 2 or 3 weeks, i will stop heparin today and transfer him to lovenox and coumadin, MRi brain done report pending in regard question of new strokes, discussed with nurse , pt and daughter

## 2017-07-07 NOTE — PROGRESS NOTES
History:    77-year-old gentleman has a vein thrombosis.  He has a history of factor V Leiden.  Today he had upper endoscopy.  He was found to have esophageal varices.  He had no significant neoplastic lesions in the upper GI tract.    The patient has had not confusion past few days.  Last night his oxygen saturation drop below 90.  He was placed on 2 L of oxygen.  Patient complains of some low back pain.  He has a history of degenerative disc disease.    Allergies  Review of patient's allergies indicates no known allergies.      Current Facility-Administered Medications:   •  dextrose (D50W) solution 25 g, 25 g, Intravenous, Q15 Min PRN, Landry Ho MD  •  dextrose (D50W) solution 25 g, 25 g, Intravenous, Q15 Min PRN, Ceferino Gillespie MD  •  dextrose (GLUTOSE) oral gel 15 g, 15 g, Oral, Q15 Min PRN, Landry Ho MD  •  dextrose (GLUTOSE) oral gel 15 g, 15 g, Oral, Q15 Min PRN, Ceferino Gillespie MD  •  donepezil (ARICEPT) tablet 5 mg, 5 mg, Oral, Nightly, Ceferino Gillespie MD, 5 mg at 07/06/17 2106  •  glucagon (human recombinant) (GLUCAGEN DIAGNOSTIC) injection 1 mg, 1 mg, Subcutaneous, Q15 Min PRN, Landry Ho MD  •  glucagon (human recombinant) (GLUCAGEN DIAGNOSTIC) injection 1 mg, 1 mg, Subcutaneous, Q15 Min PRN, Ceferino Gillespie MD  •  heparin (porcine) 5000 UNIT/ML injection 2,900-5,900 Units, 40-80 Units/kg, Intravenous, Q6H PRN, Sher Siddiqui MD  •  heparin 04464 units/250 mL (100 units/mL) in 0.45 % NaCl infusion, 18 Units/kg/hr, Intravenous, Titrated, Sher Siddiqui MD, Last Rate: 11.02 mL/hr at 07/06/17 1257, 15 Units/kg/hr at 07/06/17 1257  •  HYDROcodone-acetaminophen (NORCO) 5-325 MG per tablet 1 tablet, 1 tablet, Oral, Q4H PRN, Ceferino Gillespie MD, 1 tablet at 07/06/17 0818  •  insulin aspart (novoLOG) injection 0-9 Units, 0-9 Units, Subcutaneous, 4x Daily With Meals & Nightly, Ceferino Gillespie MD, 4 Units at 07/06/17 2697  •  insulin detemir (LEVEMIR) injection 35 Units, 35 Units,  "Subcutaneous, Nightly, Ceferino Gillespie MD, 35 Units at 07/06/17 2226  •  lactated ringers infusion 1,000 mL, 1,000 mL, Intravenous, Continuous PRN, Tim Xie MD, Stopped at 07/06/17 1456  •  levETIRAcetam (KEPPRA) tablet 500 mg, 500 mg, Oral, BID, Ceferino Gillespie MD, 500 mg at 07/06/17 1801  •  morphine injection 1 mg, 1 mg, Intravenous, Q4H PRN **AND** naloxone (NARCAN) injection 0.4 mg, 0.4 mg, Intravenous, Q5 Min PRN, Ceferino Gillespie MD  •  nadolol (CORGARD) tablet 20 mg, 20 mg, Oral, Q24H, Tim Xie MD  •  oxybutynin (DITROPAN) tablet 5 mg, 5 mg, Oral, Daily, Ceferino Gillespie MD, 5 mg at 07/06/17 0815  •  pantoprazole (PROTONIX) EC tablet 40 mg, 40 mg, Oral, Q AM, Ceferino Gillespie MD, 40 mg at 07/07/17 0553  •  sodium chloride 0.9 % flush 1-10 mL, 1-10 mL, Intravenous, PRN, Ceferino Gillespei MD  •  sodium chloride 0.9 % flush 10 mL, 10 mL, Intravenous, PRN, Sher Siddiqui MD  •  traZODone (DESYREL) tablet 50 mg, 50 mg, Oral, Nightly, Ceferino Gillespie MD, 50 mg at 07/06/17 2106  •  valsartan (DIOVAN) tablet 160 mg, 160 mg, Oral, Q24H, Ceferino Gillespie MD, 160 mg at 07/06/17 0815  •  warfarin (COUMADIN) tablet 5 mg, 5 mg, Oral, Daily, Ceferino Gillespie MD, 5 mg at 07/06/17 1801    /86 (BP Location: Left arm, Patient Position: Lying)  Pulse 65  Temp 98.7 °F (37.1 °C) (Oral)   Resp 20  Ht 69\" (175.3 cm)  Wt 158 lb 12.8 oz (72 kg)  SpO2 93%  BMI 33.37 kg/m2    Physical Exam     Appearance: Normally developed and nourished 77-year-old gentleman.  He is in no distress.     HEENT: Normocephalic atraumatic.  Pupils round and equal.  Pharynx clear and mucous murmurs moist.     Neck: Without adenopathy JVD or thyromegaly.     Lungs: Clear to auscultation percussion.  Patient has slight diminished breath sounds and some rales in both bases.     Heart: Regular rate and rhythm.     Abdomen: Normal active bowel sounds.  No rebound test or guarding.  Spleen was palpable.     Extremities: No calf " tenderness.  No clubbing, cyanosis or edema.     Neurologic: Nonfocal.  No confusion this morning.  Nonfocal.  No asterixis      Lab Results (last 24 hours)     Procedure Component Value Units Date/Time    Ammonia [[id number]]  (Normal) Collected:  07/06/17 0818    Specimen:  Blood Updated:  07/06/17 0858     Ammonia 46 umol/L     Comprehensive Metabolic Panel [[id number]]  (Abnormal) Collected:  07/06/17 0818    Specimen:  Blood Updated:  07/06/17 0907     Glucose 85 mg/dL      BUN 17 mg/dL      Creatinine 1.47 (H) mg/dL      Sodium 139 mmol/L      Potassium 4.1 mmol/L      Chloride 103 mmol/L      CO2 22.6 mmol/L      Calcium 8.3 (L) mg/dL      Total Protein 6.2 g/dL      Albumin 2.70 (L) g/dL      ALT (SGPT) 34 U/L      AST (SGOT) 46 (H) U/L      Alkaline Phosphatase 171 (H) U/L      Total Bilirubin 0.7 mg/dL      eGFR Non African Amer 46 (L) mL/min/1.73      Globulin 3.5 gm/dL      A/G Ratio 0.8 g/dL      BUN/Creatinine Ratio 11.6     Anion Gap 13.4 mmol/L     Narrative:       The MDRD GFR formula is only valid for adults with stable renal function between ages 18 and 70.    POC Glucose Fingerstick [[id number]]  (Normal) Collected:  07/06/17 1112    Specimen:  Blood Updated:  07/06/17 1114     Glucose 81 mg/dL     Narrative:       Meter: [id number] : [id number] Jagdeep Hutton    POC Glucose Fingerstick [[id number]]  (Normal) Collected:  07/06/17 1648    Specimen:  Blood Updated:  07/06/17 1651     Glucose 115 mg/dL     Narrative:       Meter: [id number] : [id number] Jagdeep Hutton    Protime-INR [[id number]]  (Abnormal) Collected:  07/06/17 1643    Specimen:  Blood Updated:  07/06/17 1706     Protime 18.2 (H) Seconds      INR 1.57 (H)    JANELLE,PE and FLC, Serum [[id number]]  (Abnormal) Collected:  07/04/17 1123    Specimen:  Blood Updated:  07/06/17 1710     IgG 1129 mg/dL      IgA 129 mg/dL      IgM 46 mg/dL      Total Protein 5.7 (L) g/dL      Albumin 2.5 (L) g/dL      Alpha-1-Globulin 0.4 g/dL       Alpha-2-Globulin 1.0 g/dL      Beta Globulin 0.6 (L) g/dL      Gamma Globulin 1.1 g/dL      M-Marko Not Observed g/dL      Globulin 3.2 g/dL      A/G Ratio 0.8     Immunofixation Reflex, Serum Comment      No monoclonality detected.        Please note Comment      Protein electrophoresis scan will follow via computer, mail, or   delivery.        Free Light Chain, Kappa 94.6 (H) mg/L                     **Please note reference interval change**        Free Lambda Light Chains 36.1 (H) mg/L                     **Please note reference interval change**        Kappa/Lambda Ratio 2.62 (H)    Narrative:       Performed at:  [address]  [facility] [id number]  : Robel Del Valle PhD, Phone:  [phone]    POC Glucose Fingerstick [[id number]]  (Abnormal) Collected:  07/06/17 2133    Specimen:  Blood Updated:  07/06/17 2135     Glucose 213 (H) mg/dL     Narrative:       Meter: [id number] : [id number] Evert Carlos    CBC & Differential [[id number]] Collected:  07/07/17 0559    Specimen:  Blood Updated:  07/07/17 0649    Narrative:       The following orders were created for panel order CBC & Differential.  Procedure                               Abnormality         Status                     ---------                               -----------         ------                     Scan Slide[[id number]]                                                                  CBC Auto Differential[[id number]]        Abnormal            Final result                 Please view results for these tests on the individual orders.    CBC Auto Differential [[id number]]  (Abnormal) Collected:  07/07/17 0559    Specimen:  Blood Updated:  07/07/17 0649     WBC 11.50 (H) 10*3/mm3      RBC 5.05 10*6/mm3      Hemoglobin 13.2 (L) g/dL      Hematocrit 41.3 %      MCV 81.8 fL      MCH 26.1 (L) pg      MCHC 32.0 (L) g/dL      RDW 15.8 (H) %      RDW-SD 47.0 fl      Platelets 186 10*3/mm3      Neutrophil %  76.5 (H) %      Lymphocyte % 10.1 (L) %      Monocyte % 9.8 %      Eosinophil % 1.8 %      Basophil % 0.7 %      Immature Grans % 1.1 (H) %      Neutrophils, Absolute 8.79 (H) 10*3/mm3      Lymphocytes, Absolute 1.16 10*3/mm3      Monocytes, Absolute 1.13 10*3/mm3      Eosinophils, Absolute 0.21 10*3/mm3      Basophils, Absolute 0.08 10*3/mm3      Immature Grans, Absolute 0.13 (H) 10*3/mm3      nRBC 0.0 /100 WBC     aPTT [[id number]]  (Abnormal) Collected:  07/07/17 0559    Specimen:  Blood Updated:  07/07/17 0652     PTT 71.8 (H) seconds     Protime-INR [[id number]]  (Abnormal) Collected:  07/07/17 0559    Specimen:  Blood Updated:  07/07/17 0652     Protime 18.6 (H) Seconds      INR 1.61 (H)    POC Glucose Fingerstick [[id number]]  (Normal) Collected:  07/07/17 0742    Specimen:  Blood Updated:  07/07/17 0743     Glucose 92 mg/dL     Narrative:       Meter: [id number] : [id number] Jono Griffin          Imp:    1.  Thrombophilia with portal vein thrombosis.  The patient has factor V Leiden.   evaluation has not revealed  a malignancy     2.  Type 2 diabetes mellitus.  Blood sugars are up and down.  Levemir was increased     3.   Esophageal varices related to the portal vein thrombosis.  Patient's low albumin and exaggerated response to 5 mg of Coumadin suggest hepatic dysfunction    4.  Hypoxemia last night.  Decreased breath sounds and basilar rales.  Consider atelectasis/effusion     5.  Essential hypertension.     6.  Hyperlipidemia.     7.  History of and no CVA of the lung and prostate cancer.     8.  Stool heme positive on admission.  He has evidence of iron deficiency.     9.  Chronic renal insufficiency.  Stage III B      Plan:    I started the patient on Coumadin yesterday.  I did not realize hematology wish begin Lovenox.  I will leave further anticoagulation to hematology.    I'm going to repeat an EKG and check a chest x-ray PA and lateral today.    We'll continue other treatment.    Ceferino Gillespie,  MD  7/7/2017  8:01 AM

## 2017-07-07 NOTE — THERAPY TREATMENT NOTE
Acute Care - Physical Therapy Treatment Note  [facility]     Patient Name: Alfredo Gonzalez  : 1940  MRN: [id number]  Today's Date: 2017  Onset of Illness/Injury or Date of Surgery Date: 17     Referring Physician: Verna    Admit Date: 7/3/2017    Visit Dx:    ICD-10-CM ICD-9-CM   1. Acute venous thrombosis I82.90 453.9   2. Generalized abdominal pain R10.84 789.07   3. General weakness R53.1 780.79   4. Portal vein thrombosis I81 452   5. Iron deficiency anemia, unspecified iron deficiency anemia type D50.9 280.9     Patient Active Problem List   Diagnosis   • Non-small cell lung cancer   • Chest pain   • Chronic kidney disease (CKD)   • Microcytic hypochromic anemia   • History of prostate cancer   • Anemia, B12 deficiency   • Iron deficiency anemia   • Pernicious anemia   • Portal vein thrombosis   • Portal vein thrombosis               Adult Rehabilitation Note       17 1513 17 1149       Rehab Assessment/Intervention    Discipline physical therapy assistant  -ALEJANDRO physical therapist  -     Document Type therapy note (daily note)  - therapy note (daily note)  -     Subjective Information agree to therapy  - agree to therapy;no complaints  -     Patient Effort, Rehab Treatment  good  -     Symptoms Noted During/After Treatment  none  -ELY     Precautions/Limitations fall precautions;oxygen therapy device and L/min  -ALEJANDRO fall precautions  -     Recorded by [JW] Lois Mccray PTA [KH] Yamel Hodgson, PT     Pain Assessment    Pain Assessment No/denies pain  -ALEJANDRO No/denies pain  -KH     Recorded by [ALEJANDRO] Lois Mccray PTA [KH] Yamel Hodgson, PT     Cognitive Assessment/Intervention    Current Cognitive/Communication Assessment functional  -ALEJANDRO      Personal Safety Interventions fall prevention program maintained;gait belt  -      Recorded by [ALEJANDRO] Lois Mccray PTA      Bed Mobility, Assessment/Treatment    Bed Mobility, Assistive Device  bed  rails  -KH     Bed Mob, Supine to Sit, [place] conditional independence  -JW conditional independence  -KH     Bed Mob, Sit to Supine, [place] contact guard assist  - conditional independence  -KH     Recorded by [] Lois Mccray PTA [] Yamel Hodgson, PT     Transfer Assessment/Treatment    Transfers, Sit-Stand [place] stand by assist;contact guard assist  - contact guard assist  -     Transfers, Stand-Sit [place] stand by assist;contact guard assist  - contact guard assist  -     Transfers, Sit-Stand-Sit, Assist Device rolling walker  - rolling walker  -     Recorded by [JW] Lois Mccray PTA [] Yamle Hodgson, PT     Gait Assessment/Treatment    Gait, [place] Level contact guard assist  - contact guard assist  -     Gait, Assistive Device rolling walker  - rolling walker  -     Gait, Distance (Feet) 300  - 150  -KH     Gait, Gait Deviations jake decreased;forward flexed posture;step length decreased  - jake decreased;forward flexed posture;step length decreased  -     Gait, Comment  cues to keep walker closer  -     Recorded by [JW] Lois Mccray PTA [] Yamel Hodgson, PT     Therapy Exercises    Bilateral Lower Extremities  AROM:;10 reps;ankle pumps/circles;hip flexion;LAQ  -     Recorded by  [] Yamel Hodgson, PT     Positioning and Restraints    Pre-Treatment Position in bed  - in bed  -     Post Treatment Position bed  - bed  -     In Bed supine;call light within reach;with family/caregiver  - supine;call light within reach;encouraged to call for assist;exit alarm on;side rails up x3;with nsg;with family/caregiver  -     Recorded by [JW] Lois Mccray PTA [] Yamel Hodgson, PT       User Key  (r) = Recorded By, (t) = Taken By, (c) = Cosigned By    Initials Name Effective Dates    ELY Hodgson, PT 05/18/15 -     ALEJANDRO Mccray PTA 02/18/16 -                  IP PT Goals       07/05/17 1150          Transfer Training PT LTG    Transfer Training PT LTG, Date Established 07/05/17  -EE (r) ELICEO (t) EE (c)      Transfer Training PT LTG, Time to Achieve 1 wk  -EE (r) ELICEO (t) EE (c)      Transfer Training PT LTG, Activity Type all transfers  -EE (r) ELICEO (t) EE (c)      Transfer Training PT LTG, [place] Level supervision required  -EE (r) ELICEO (t) EE (c)      Transfer Training PT LTG, Assist Device walker, rolling  -EE (r) ELICEO (t) EE (c)      Gait Training PT LTG    Gait Training Goal PT LTG, Date Established 07/05/17  -EE (r) ELICEO (t) EE (c)      Gait Training Goal PT LTG, Time to Achieve 1 wk  -EE (r) ELICEO (t) EE (c)      Gait Training Goal PT LTG, [place] Level supervision required  -EE (r) ELICEO (t) EE (c)      Gait Training Goal PT LTG, Assist Device walker, rolling  -EE (r) ELICEO (t) EE (c)      Gait Training Goal PT LTG, Distance to Achieve 300  -EE (r) ELICEO (t) EE (c)      Stair Training PT LTG    Stair Training Goal PT LTG, Date Established 07/05/17  -EE (r) ELICEO (t) EE (c)      Stair Training Goal PT LTG, Time to Achieve 1 wk  -EE (r) ELICEO (t) EE (c)      Stair Training Goal PT LTG, Number of Steps 5  -EE (r) ELICEO (t) EE (c)      Stair Training Goal PT LTG, [place] Level contact guard assist  -EE (r) ELICEO (t) EE (c)      Stair Training Goal PT LTG, Assist Device 1 handrail  -EE (r) ELICEO (t) EE (c)        User Key  (r) = Recorded By, (t) = Taken By, (c) = Cosigned By    Initials Name Provider Type    JESENIA Mitchell, PT Physical Therapist    ELICEO Santacruz, PT Student PT Student          Physical Therapy Education     Title: PT OT SLP Therapies (Done)     Topic: Physical Therapy (Done)     Point: Mobility training (Done)    Learning Progress Summary    Learner Readiness Method Response Comment Documented by Status   Patient Acceptance E CHANTAL ALLEN 07/07/17 1518 Done    Acceptance E NILES CORNEJO 07/06/17 1151 Done    Acceptance E NILES CORNEJO 07/05/17 1149 Done   Family Acceptance  E VU,NR   07/05/17 1149 Done               Point: Home exercise program (Done)    Learning Progress Summary    Learner Readiness Method Response Comment Documented by Status   Patient Acceptance E VU,NR   07/06/17 1151 Done    Acceptance E VU,NR   07/05/17 1149 Done   Family Acceptance E VU,NR   07/05/17 1149 Done               Point: Body mechanics (Done)    Learning Progress Summary    Learner Readiness Method Response Comment Documented by Status   Patient Acceptance E VU,NR   07/05/17 1149 Done   Family Acceptance E VU,NR   07/05/17 1149 Done               Point: Precautions (Done)    Learning Progress Summary    Learner Readiness Method Response Comment Documented by Status   Patient Acceptance E VU,NR   07/05/17 1149 Done   Family Acceptance E VU,[facility] 07/05/17 1149 Done                      User Key     Initials Effective Dates Name Provider Type Discipline     05/18/15 -  Yamel Hodgson, PT Physical Therapist PT     02/18/16 -  Lois Mccray, PTA Physical Therapy Assistant PT     05/08/17 -  Leighton Santacruz, PT Student PT Student PT                    PT Recommendation and Plan  Anticipated Equipment Needs At Discharge: front wheeled walker  Anticipated Discharge Disposition: home with home health  Planned Therapy Interventions: balance training, gait training, home exercise program, patient/family education, strengthening  PT Frequency: daily  Plan of Care Review  Progress: progress toward functional goals as expected          Outcome Measures       07/07/17 1500 07/06/17 1152 07/05/17 1100    How much help from another person do you currently need...    Turning from your back to your side while in flat bed without using bedrails? 4  - 4  - 4  -EE (r) ELICEO (t) EE (c)    Moving from lying on back to sitting on the side of a flat bed without bedrails? 4  - 4  - 4  -EE (r) ELICEO (t) EE (c)    Moving to and from a bed to a chair (including a wheelchair)? 3  - 3  - 3  -EE (r)  ELICEO (t) EE (c)    Standing up from a chair using your arms (e.g., wheelchair, bedside chair)? 3  - 3  -KH 3  -EE (r) ELICEO (t) EE (c)    Climbing 3-5 steps with a railing? 2  - 2  -KH 2  -EE (r) ELICEO (t) EE (c)    To walk in hospital room? 3  - 3  -KH 3  -EE (r) ELICEO (t) EE (c)    AM-PAC 6 Clicks Score 19  - 19  -KH 19  -EE (r) ELICEO (t)    Functional Assessment    Outcome Measure Options   AM-PAC 6 Clicks Basic Mobility (PT)  -EE (r) ELICEO (t) EE (c)      User Key  (r) = Recorded By, (t) = Taken By, (c) = Cosigned By    Initials Name Provider Type     Yamel Hodgson, PT Physical Therapist    EE Nida Mitchell, PT Physical Therapist    ALEJANDRO Mccray PTA Physical Therapy Assistant    ELICEO Santacruz, PT Student PT Student           Time Calculation:         PT Charges       07/07/17 1511          Time Calculation    Start Time 1454  -      Stop Time 1511  -      Time Calculation (min) 17 min  -      PT Received On 07/07/17  -      PT - Next Appointment 07/08/17  -        User Key  (r) = Recorded By, (t) = Taken By, (c) = Cosigned By    Initials Name Provider Type     Lois Mccray PTA Physical Therapy Assistant          Therapy Charges for Today     Code Description Service Date Service Provider Modifiers Qty    46372822325 HC PT THER PROC EA 15 MIN 7/7/2017 Lois Mccray PTA GP 1          PT G-Codes  Outcome Measure Options: AM-PAC 6 Clicks Basic Mobility (PT)    Lois Mccray PTA  7/7/2017

## 2017-07-07 NOTE — PROGRESS NOTES
Continued Stay Note  [facility]     Patient Name: Alfredo Gonzalez  MRN: [id number]  Today's Date: 7/7/2017    Admit Date: 7/3/2017          Discharge Plan       07/07/17 1539    Case Management/Social Work Plan    Plan home with [facility]    Patient/Family In Agreement With Plan yes    Additional Comments Spoke with patient and wife in room.  They confirm that plan is home at [place].  Per PT notes, patient ambulating 300ft with CGA.  Patient and wife aggreeable to having home health- would like to use the agency they have before.  I was unable to locate records of previous HH, so they are agreeable to using [facility].  Referral called to [facility] and they will follow.  CCP will follow.               Discharge Codes     None            Yara Cervantes RN

## 2017-07-07 NOTE — PLAN OF CARE
Problem: Patient Care Overview (Adult)  Goal: Plan of Care Review  Outcome: Ongoing (interventions implemented as appropriate)    07/07/17 0351   Coping/Psychosocial Response Interventions   Plan Of Care Reviewed With patient;daughter   Patient Care Overview   Progress no change   Outcome Evaluation   Outcome Summary/Follow up Plan pt resting well. VSS. O2sats decreased to 80's. 1L o2 applied. Will continue to monitor.       Goal: Adult Individualization and Mutuality  Outcome: Ongoing (interventions implemented as appropriate)  Goal: Discharge Needs Assessment  Outcome: Ongoing (interventions implemented as appropriate)    Problem: Skin Integrity Impairment, Risk/Actual (Adult)  Goal: Identify Related Risk Factors and Signs and Symptoms  Outcome: Ongoing (interventions implemented as appropriate)  Goal: Skin Integrity/Wound Healing  Outcome: Ongoing (interventions implemented as appropriate)    Problem: Fall Risk (Adult)  Goal: Identify Related Risk Factors and Signs and Symptoms  Outcome: Ongoing (interventions implemented as appropriate)  Goal: Absence of Falls  Outcome: Ongoing (interventions implemented as appropriate)    Problem: Diabetes, Type 2 (Adult)  Goal: Signs and Symptoms of Listed Potential Problems Will be Absent or Manageable (Diabetes, Type 2)  Outcome: Ongoing (interventions implemented as appropriate)    Problem: GI Endoscopy (Adult)  Goal: Signs and Symptoms of Listed Potential Problems Will be Absent or Manageable (GI Endoscopy)  Outcome: Ongoing (interventions implemented as appropriate)

## 2017-07-07 NOTE — PLAN OF CARE
Problem: Patient Care Overview (Adult)  Goal: Plan of Care Review    07/07/17 1518   Patient Care Overview   Progress progress toward functional goals as expected

## 2017-07-08 NOTE — THERAPY TREATMENT NOTE
Acute Care - Physical Therapy Treatment Note  [facility]     Patient Name: Alfredo Gonzalez  : 1940  MRN: [id number]  Today's Date: 2017  Onset of Illness/Injury or Date of Surgery Date: 17     Referring Physician: Verna    Admit Date: 7/3/2017    Visit Dx:    ICD-10-CM ICD-9-CM   1. Acute venous thrombosis I82.90 453.9   2. Generalized abdominal pain R10.84 789.07   3. General weakness R53.1 780.79   4. Portal vein thrombosis I81 452   5. Iron deficiency anemia, unspecified iron deficiency anemia type D50.9 280.9     Patient Active Problem List   Diagnosis   • Non-small cell lung cancer   • Chest pain   • Chronic kidney disease (CKD)   • Microcytic hypochromic anemia   • History of prostate cancer   • Anemia, B12 deficiency   • Iron deficiency anemia   • Pernicious anemia   • Portal vein thrombosis   • Portal vein thrombosis               Adult Rehabilitation Note       17 1500 17 1513 17 1149    Rehab Assessment/Intervention    Discipline physical therapist  -WILLIAM physical therapy assistant  -ALEJANDRO physical therapist  -ELY    Document Type therapy note (daily note)  -WILLIAM therapy note (daily note)  -ALEJANDRO therapy note (daily note)  -KH    Subjective Information agree to therapy  -WILLIAM agree to therapy  -JW agree to therapy;no complaints  -KH    Patient Effort, Rehab Treatment good  -WILLIAM  good  -KH    Symptoms Noted During/After Treatment   none  -KH    Precautions/Limitations oxygen therapy device and L/min   2L  -WILLIAM fall precautions;oxygen therapy device and L/min  -ALEJANDRO fall precautions  -KH    Recorded by [WILLIAM] Lissy Lopez, PT [JW] Lois Mccray, PTA [KH] Yamel Hdogson, PT    Pain Assessment    Pain Assessment No/denies pain  -WILLIAM No/denies pain  -JW No/denies pain  -KH    Recorded by [WILLIAM] Lissy Lopez, PT [JW] Lois Mccray, PTA [KH] Yamel Hodgson, PT    Cognitive Assessment/Intervention    Current Cognitive/Communication Assessment functional  -WILLIAM  functional  -JW     Orientation Status oriented x 4  -WILLIAM      Personal Safety WNL/WFL  -WILLIAM      Personal Safety Interventions fall prevention program maintained;gait belt;nonskid shoes/slippers when out of bed  -WILLIAM fall prevention program maintained;gait belt  -JW     Recorded by [WILLIAM] Lissy Lopez, PT [JW] Lois Mccray PTA     Bed Mobility, Assessment/Treatment    Bed Mobility, Assistive Device bed rails;head of bed elevated  -WILLIAM  bed rails  -    Bed Mob, Supine to Sit, Daniels conditional independence  -WILLIAM conditional independence  -JW conditional independence  -    Bed Mob, Sit to Supine, Daniels supervision required;verbal cues required  -WILLIAM contact guard assist  - conditional independence  -    Recorded by [WILLIAM] Lissy Lopez, PT [JW] Lois Mccray, MAGUI [KH] Yamel Hodgson, PT    Transfer Assessment/Treatment    Transfers, Sit-Stand Daniels stand by assist;verbal cues required  -WILLIAM stand by assist;contact guard assist  -JW contact guard assist  -    Transfers, Stand-Sit Daniels stand by assist;verbal cues required  -WILLIAM stand by assist;contact guard assist  - contact guard assist  -    Transfers, Sit-Stand-Sit, Assist Device rolling walker  -WILLIAM rolling walker  -JW rolling walker  -    Recorded by [WILLIAM] Lissy Lopez, PT [JW] Lois Mccray, MAGUI [KH] Yamel Hodgson, PT    Gait Assessment/Treatment    Gait, Daniels Level stand by assist;verbal cues required  -WILLIAM contact guard assist  -JW contact guard assist  -    Gait, Assistive Device rolling walker  -WILLIAM rolling walker  -JW rolling walker  -    Gait, Distance (Feet) 300  -WILLIAM 300  -  -KH    Gait, Gait Deviations  ajke decreased;forward flexed posture;step length decreased  - jake decreased;forward flexed posture;step length decreased  -    Gait, Comment   cues to keep walker closer  -    Recorded by [WILLIAM] Lissy Lopez, PT [JW] Lois Mccray, MAGUI [] Yamel  Chrystal Hodgson, PT    Therapy Exercises    Bilateral Lower Extremities   AROM:;10 reps;ankle pumps/circles;hip flexion;LAQ  -KH    Recorded by   [] Yamel Hodgson, PT    Positioning and Restraints    Pre-Treatment Position in bed  -WILLIAM in bed  -JW in bed  -    Post Treatment Position bed  -WILLIAM bed  - bed  -    In Bed supine;call light within reach;notified nsg;exit alarm on  -WILLIAM supine;call light within reach;with family/caregiver  -JW supine;call light within reach;encouraged to call for assist;exit alarm on;side rails up x3;with nsg;with family/caregiver  -    Recorded by [WILLIAM] Lissy Lopez, PT [JW] Lois Mccray, PTA [] Yamel Hodgson, PT      User Key  (r) = Recorded By, (t) = Taken By, (c) = Cosigned By    Initials Name Effective Dates     Yamel Hodgson, PT 05/18/15 -     WILLIAM Lissy Lopez, PT 10/06/15 -      Lois Mccray, PTA 02/18/16 -                 IP PT Goals       07/05/17 1150          Transfer Training PT LTG    Transfer Training PT LTG, Date Established 07/05/17  -EE (r) ELICEO (t) EE (c)      Transfer Training PT LTG, Time to Achieve 1 wk  -EE (r) ELICEO (t) EE (c)      Transfer Training PT LTG, Activity Type all transfers  -EE (r) ELICEO (t) EE (c)      Transfer Training PT LTG, [place] Level supervision required  -EE (r) ELICEO (t) EE (c)      Transfer Training PT LTG, Assist Device walker, rolling  -EE (r) ELICEO (t) EE (c)      Gait Training PT LTG    Gait Training Goal PT LTG, Date Established 07/05/17  -EE (r) ELICEO (t) EE (c)      Gait Training Goal PT LTG, Time to Achieve 1 wk  -EE (r) ELICEO (t) EE (c)      Gait Training Goal PT LTG, [place] Level supervision required  -EE (r) ELICEO (t) EE (c)      Gait Training Goal PT LTG, Assist Device walker, rolling  -EE (r) ELICEO (t) EE (c)      Gait Training Goal PT LTG, Distance to Achieve 300  -EE (r) ELICEO (t) EE (c)      Stair Training PT LTG    Stair Training Goal PT LTG, Date Established 07/05/17  -EE (r) ELICEO (t) EE (c)       Stair Training Goal PT LTG, Time to Achieve 1 wk  -EE (r) ELICEO (t) EE (c)      Stair Training Goal PT LTG, Number of Steps 5  -EE (r) ELICEO (t) EE (c)      Stair Training Goal PT LTG, [place] Level contact guard assist  -EE (r) ELICEO (t) EE (c)      Stair Training Goal PT LTG, Assist Device 1 handrail  -EE (r) ELICEO (t) EE (c)        User Key  (r) = Recorded By, (t) = Taken By, (c) = Cosigned By    Initials Name Provider Type    EE Nida Mitchell, PT Physical Therapist    ELICEO Leighton Santacruz, PT Student PT Student          Physical Therapy Education     Title: PT OT SLP Therapies (Done)     Topic: Physical Therapy (Done)     Point: Mobility training (Done)    Learning Progress Summary    Learner Readiness Method Response Comment Documented by Status   Patient Acceptance E VU,NR  WILLIAM 07/08/17 1716 Done    Acceptance E VU   07/07/17 1518 Done    Acceptance E VU,NR   07/06/17 1151 Done    Acceptance E VU,NR   07/05/17 1149 Done   Family Acceptance E VU,[facility] 07/05/17 1149 Done               Point: Home exercise program (Done)    Learning Progress Summary    Learner Readiness Method Response Comment Documented by Status   Patient Acceptance E VU,NR   07/06/17 1151 Done    Acceptance E VU,NR   07/05/17 1149 Done   Family Acceptance E VU,[facility] 07/05/17 1149 Done               Point: Body mechanics (Done)    Learning Progress Summary    Learner Readiness Method Response Comment Documented by Status   Patient Acceptance E VU,NR  ELICEO 07/05/17 1149 Done   Family Acceptance E VU,NR   07/05/17 1149 Done               Point: Precautions (Done)    Learning Progress Summary    Learner Readiness Method Response Comment Documented by Status   Patient Acceptance E VU,NR  ELICEO 07/05/17 1149 Done   Family Acceptance E VU,NR   07/05/17 1149 Done                      User Key     Initials Effective Dates Name Provider Type Discipline     05/18/15 -  Yamel Hodgson, PT Physical Therapist PT    WILLIAM 10/06/15 -  Lissy LEWIS  John, PT Physical Therapist PT     02/18/16 -  Lois Mccray PTA Physical Therapy Assistant PT    ELICEO 05/08/17 -  Leighton Santacruz, PT Student PT Student PT                    PT Recommendation and Plan  Anticipated Equipment Needs At Discharge: front wheeled walker  Anticipated Discharge Disposition: home with home health  Planned Therapy Interventions: balance training, gait training, home exercise program, patient/family education, strengthening  PT Frequency: daily  Plan of Care Review  Plan Of Care Reviewed With: patient  Progress: progress toward functional goals as expected          Outcome Measures       07/08/17 1700 07/07/17 1500 07/06/17 1152    How much help from another person do you currently need...    Turning from your back to your side while in flat bed without using bedrails? 4  -WILLIAM 4  -JW 4  -KH    Moving from lying on back to sitting on the side of a flat bed without bedrails? 4  -WILLIAM 4  -JW 4  -KH    Moving to and from a bed to a chair (including a wheelchair)? 4  -WILLIAM 3  -JW 3  -KH    Standing up from a chair using your arms (e.g., wheelchair, bedside chair)? 4  -WILLIAM 3  -JW 3  -KH    Climbing 3-5 steps with a railing? 3  -WILLIAM 2  -JW 2  -KH    To walk in hospital room? 4  -WILLIAM 3  -JW 3  -KH    AM-PAC 6 Clicks Score 23  -WILLIAM 19  -JW 19  -KH    Functional Assessment    Outcome Measure Options AM-PAC 6 Clicks Basic Mobility (PT)  -WILLIAM        User Key  (r) = Recorded By, (t) = Taken By, (c) = Cosigned By    Initials Name Provider Type     Yamel Hodgson, PT Physical Therapist    WILLIAM Lopez, PT Physical Therapist    ALEJANDRO Mccray, MAGUI Physical Therapy Assistant           Time Calculation:         PT Charges       07/08/17 1717          Time Calculation    Start Time 1500  -WILLIAM      Stop Time 1530  -WILLIAM      Time Calculation (min) 30 min  -WILLIAM      PT Received On 07/08/17  -WILLIAM      PT - Next Appointment 07/09/17  -WILLIAM        User Key  (r) = Recorded By, (t) = Taken By, (c) =  Cosigned By    Initials Name Provider Type    WILLIAM Lissy Lopez, PT Physical Therapist          Therapy Charges for Today     Code Description Service Date Service Provider Modifiers Qty    84656284792 HC PT THER PROC EA 15 MIN 7/8/2017 Lissy Lopez, PT GP 2          PT G-Codes  Outcome Measure Options: AM-PAC 6 Clicks Basic Mobility (PT)    Lissy Lopez, PT  7/8/2017

## 2017-07-08 NOTE — PLAN OF CARE
Problem: Patient Care Overview (Adult)  Goal: Plan of Care Review  Outcome: Ongoing (interventions implemented as appropriate)    07/08/17 0458   Coping/Psychosocial Response Interventions   Plan Of Care Reviewed With patient   Patient Care Overview   Progress improving   Outcome Evaluation   Outcome Summary/Follow up Plan No new nursing issues overnight. Pt states he is ready to go home. Lovenox therapy continued.         Problem: Skin Integrity Impairment, Risk/Actual (Adult)  Goal: Identify Related Risk Factors and Signs and Symptoms  Outcome: Outcome(s) achieved Date Met:  07/08/17  Goal: Skin Integrity/Wound Healing  Outcome: Ongoing (interventions implemented as appropriate)    Problem: Fall Risk (Adult)  Goal: Absence of Falls  Outcome: Ongoing (interventions implemented as appropriate)    Problem: Diabetes, Type 2 (Adult)  Goal: Signs and Symptoms of Listed Potential Problems Will be Absent or Manageable (Diabetes, Type 2)  Outcome: Ongoing (interventions implemented as appropriate)    Problem: GI Endoscopy (Adult)  Goal: Signs and Symptoms of Listed Potential Problems Will be Absent or Manageable (GI Endoscopy)  Outcome: Ongoing (interventions implemented as appropriate)

## 2017-07-08 NOTE — PLAN OF CARE
Problem: Patient Care Overview (Adult)  Goal: Plan of Care Review  Outcome: Ongoing (interventions implemented as appropriate)    07/08/17 9637   Coping/Psychosocial Response Interventions   Plan Of Care Reviewed With patient   Patient Care Overview   Progress progress toward functional goals as expected

## 2017-07-08 NOTE — PROGRESS NOTES
Subjective .  Modest improvement in fatigue , worsening foot pain persisting    REASONS FOR FOLLOWUP:    1. Previous assessment per superior sagittal sinus thrombosis, left frontal intracerebral hemorrhage, acute calf vein thrombosis, hypercoagulable assessment positive for heterozygosity for factor V 5 Leiden gene mutation.   2. Ongoing anticoagulation with Coumadin followed by Dr. Gillespie.   3. Admission on 08/04/2014 with severe right knee pain, spontaneous infusion, calf vein thrombosis despite therapeutic anticoagulation.   4. Repeat consultation 08/05/2014 leading to chest CT revealing irregula r nodule in the right apex indeterminate ? malignancy.   5. Thoracic surgery consultation, Neurovascular surgery consultation obtained, IVC filter placed, exploratory right video assisted fluoroscopy with wedge resection right upper lobe lymphadenectomy 08/15/ 2014, pathology consistent with primary lung adenocarcinoma, stage [id number] stage IA, EGFR mutation, positive for Exon 19 mutation, (potentially responsive to EGFR tyrosine kinase therapy).   6. The patient was seen in the office 09/23/2014, adjuvant therapy not felt necessary, IVC filter to be removed, continue Xarelto therapy thereafter recommended  7. Patient reviewed December 21, 2016 with ongoing anemia-microcytic, hypochromic  8. Determination of considerable iron deficiency and B12 deficiency?  Pernicious anemia, replacement therapy is initiated  9. Patient reviewed March 8, considerable improvement per anemia though iron deficiency persists, patient with progressive foot pain, foot lesions developing, follow-up scan obtained  10. Patient reviewed April 3, CT scans negative, anemia resolved, potential lower extremity vasculopathy-dermatologic assessment pursued  11. Patient reviewed May 31, 2017, no significant improvement per lower extremity vasculopathy    HISTORY OF PRESENT ILLNESS:  The patient is a 77 y.o. year old male.  This patient was admitted to  the hospital intensive care unit yesterday with a at least 3 days history of persistent abdominal pain and 67/10 in the periumbilical area and epigastric area continues associated with mild nausea refused they have 3 N1 of dictation.  He has no abdominal distention no fevers no chills no diaphoresis and no retrosternal chest pain.  The time of admission through the emergency room he was found to have significant abnormalities in liver function tests filter assessment documented to a CT scan of the abdomen to the patient had extensive portal vein thrombosis.  He was also noticed that the patient splenomegaly.  Thrombosis of the splenic vein.  Obviously the patient was admitted to the intensive care unit he was placed on heparin and actually his abdominal pain has substantially subsided since then to the point of her solution and this is starting to have a and regular diet today.  The patient states today that the abdominal pain is no new has been ongoing on for at least 2to3 months Mild and intensity progressive to the point that he got yesterday.  Also he has lost his appetite take completely and he has lost 20 pounds of weight in the last 2-3 months.  Again he has not had any fever or chills he doesn't have any cough or sputum production he has not had any shortness of breath.  He denies any changes in his bowel habits.  He denies any hematemesis or melena or interim range up.  He denies any hematuria.  He hasn't had any frequency to urinate and he denies any recent urinary tract infections.  Ibuprofen skin lesions that were documented before were treated by his primary internal medicine physician with resolution.  No new lesions evolving.    History of Past  Illness     Mr. Gonzalez is a 77-year-old male who we had initially seen in consultation 02/04 through 02/14/2002 having presented at that point with a superior sagittal sinus thrombosis and left frontal intracerebral hemorrhage. Evaluation thereafter included a  hypercoagulable assessment when he was found also to have an acute calf vein thrombosis and spontaneous calf vein thrombosis on the left . The patient fortunately responded well to rehabilitation and anticoagulation. He was eventually placed on Coumadin long term and hypercoagulable assessment had revealed findings consistent with heterozygosity for factor V Leiden gene mutation. The patie nt did not have followup with us, though that is not exactly certain why.       Mr. Gonzalez was later admitted 08/04/2014 through Dr. Gillespie. The patient had gone to [employer] and while standing in [employer] he noticed his right leg starting to cramp. He began to have noticeable limping thereafter and then when knee pain became so severe his wife called 911 and he was brought to [facility] emergency room. In the ER he was found to have calf vein thrombosis involving the right leg as well as a swollen k n ee with effusion. He was admitted to be seen by Orthopedics and was found to incidentally have a prothrombin time 3.1 and thus we were asked to see him for his thrombosis despite adequate anticoagulation. At that point, he was seen by this physician and a s a result of there being concern about additional factor such as a malignancy, a CT chest, abdomen and pelvis was obtained 08/05/2014. This revealed unexpectedly an irregular 2.0 x 1.1 cm lesion in the right apical region. This was concerning for a malig n jorge. He also had non-obstructing left renal calculus and extensive lumbar spinal degenerative disease. The patient was seen by Orthopedics again with a knee tap. (Followup is still remaining concerning this). The patient was also seen by Thoracic surgeangely joyner . Please note that Dopplers 08/06/2014 revealed a subacute venous thrombosis in the right popliteal vein as well as subacute thrombus in the right calf vein. The patient was seen by Dr. Meyer per Thoracic surgery and a PET scan was obtained showing the no d ule in  right lung apex to demonstrate low level activity somewhat indeterminate with an SUV of 2.0. No other abnormalities were present. It was felt that the patient should proceed to surgical resection. An IVC filter was placed prior to surgery 08/13/201 4 by Dr. Pastor Trinh. PFTs revealed a moderate airway restriction and after discussion on 08/15/2014 the patient underwent exploratory bronchoscopy, exploratory right video assisted thoracoscopy with wedge resection, right upper lobe and lymphadenectom y . The patient remains hospitalized at this point to have the surgery rather than return for subsequent surgery. Pathology per surgical specimen revealed a primary lung adenocarcinoma, demonstrating a lepidic growth pattern. R4, station 7 and R10 nodes wer e all negative for disease. The tumor size was 2.1 x 1.3 x 1.2 unifocal adenocarcinoma. His pathologic staging was a pT1b pN0M0- stage IA. Now available also at the time of this dictation is the finding that the tumor was EGFR mutation positive with a le s ion positive for E746 [id number] mutation, Exon 19. These are reported to correlate with responsiveness to EGFR tyrosine kinase inhibitor therapies. The patient was seen by Dr. Meyer postoperative and is doing well and is also being seen by Dr. Trinh with consideration of removal of his IVC filter.       The patient now presents back to our practice for review of all of this. We discussed his time line up to this point. In a long discussion it is felt that adjuvant chemotherapy is not warranted in Mr. Gonzalez's care. Reasonably followup is however likely with at least a chest x-ray on a yearly basis possibly through Dr. Gillespie.       The patient's had follow-up through Dr. CHRISTIAN Walker since his last visit here as well as reviews to Dr. Meyer.  This includes evidently a trial of oral iron when the patient was found to be anemic previously as well as recognition of the patient's kidney function-CKD 3.  She also was seen in  emergency room in June of this year after falling from a ladder with laboratory studies revealing anemia with hemoglobin 8.5, hematocrit 30.5 and MCV that Dr. 63.3 with MCH of 17.6 and an MCHC of 27.9, platelet count of 333,000 with normal differential.  In reviewing the patient's blood counts he is definitely developed microcytosis and hypochromia since June and, as such, when immediately as concerned about iron deficiency as an underlying issue.  He also has chronic leukocytosis that appears to be unchanged from previous.  We discussed IV iron as a treatment to address the immediate issue and thereafter try to determine the source of blood loss if indeed iron deficiency is documented.     The patient went on to be treated with both IV Feraheme ×2 on December 23 and December 30 as well as be given injections of B12 both visits.  As he seen back today January 30 he is feeling some better and his wife has also noticed a difference.  We plan to continue the B12 loading over the next several weeks as well as assessing him for pernicious anemia laboratories today.  The patient is now seen back March 08, 2017.  While he's had some improvement in his energy level he still has pain in his feet which has progressed associated with a mildly raised, violaceous group of lesions involving his calf and left foot have developed.  He indicates he is to see a podiatrist in the near future and with the findings of pernicious anemia, ongoing iron deficiency, and unexplained rash developing in the setting of a history of non-small cell lung cancer I requested a PET/CT-?  Paraneoplasia or even new malignant process.    The patient initially was to undergo a PET/CT but this was declined.  A CT of chest and pelvis was obtained with no evidence of metastatic disease identified.  The patient hematologically went on to further improve but has, unfortunately, considerable pain in his lower extremities associated with a rash that has a  partially vasculitic appearance.  The areas in question are erythematous, serpiginous, mildly raised, and very tender to palpation.  He's been seen by podiatry and treated topically without effect.   The patient was thereafter seen evidently by both dermatology and vascular surgery.  Unfortunately he still has significant pain though this is managed in part by current use of pain medications with modest use thus far.  He is encouraged to use this more frequently and plans are to contact Dr. CHRISTIAN Walker as well.    Past Medical History:   Diagnosis Date   • Arthralgia    • Arthritis    • Back pain    • Chronic mixed headache syndrome    • Deep venous thrombosis of right popliteal vein     of right knee hemarthrosis   • Diabetes mellitus    • Diverticulosis    • DJD (degenerative joint disease)    • Factor V Leiden    • GERD (gastroesophageal reflux disease)    • Hypercholesterolemia    • Hypertension    • Intracerebral hemorrhage    • Lumbar spinal stenosis    • Muscular aches    • Neck pain    • Non-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   • Thrombosis     Lower extremity       ONCOLOGIC HISTORY:  (History from previous dates can be found in the separate document.)    No current facility-administered medications on file prior to encounter.      Current Outpatient Prescriptions on File Prior to Encounter   Medication Sig Dispense Refill   • Calcium Carbonate-Vitamin D (CALCIUM-D) 600-400 MG-UNIT tablet Take 1 tablet by mouth daily.     • carvedilol (COREG) 12.5 MG tablet      • cilostazol (PLETAL) 50 MG tablet      • clotrimazole-betamethasone (LOTRISONE) 1-0.05 % cream      • HYDROcodone-acetaminophen (NORCO) 5-325 MG per tablet Take 1 tablet by mouth every 6 (six) hours as needed.     • Insulin Syringe-Needle U-100 (RELION INSULIN SYR  "0.5ML/31G) 31G X 5/16\" 0.5 ML misc      • irbesartan-hydrochlorothiazide (AVALIDE) 300-12.5 MG tablet Take 1 tablet by mouth daily.     • pravastatin (PRAVACHOL) 80 MG tablet Take  by mouth daily.     • rivaroxaban (XARELTO) tablet Take 20 mg by mouth daily with dinner.     • vitamin B-6 (PYRIDOXINE) 100 MG tablet Take 100 mg by mouth.         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     Social History Main Topics   • Smoking status: Former Smoker     Packs/day: 1.00     Years: 20.00     Quit date: 2/8/1996   • Smokeless tobacco: Not on file   • Alcohol use No   • Drug use: No   • Sexual activity: No     Other Topics Concern   • Not on file     Social History Narrative         Cancer-related family history includes Colon cancer in his mother; Lung cancer (age of onset: 77) in his brother.     Review of Systems    General: no fever, chills,significant fatigue,20 lb loss weight changes, and lack of appetite.  Eyes: no epiphora, xerophthalmia,conjunctivitis, pain, glaucoma, blurred vision, blindness, secretion, photophobia, proptosis, diplopia.  Ears: no otorrhea, tinnitus, otorrhagia, deafness, pain, vertigo.  Nose: no rhinorrhea, epistaxis, alteration in perception of odors, sinuses pressure.  Mouth: no alteration in gums or teeth,  ulcers, no difficulty with mastication or deglut ion, no odynophagia.  Neck: no masses or pain, no thyroid alterations, no pain in muscles or arteries, no carotid odynia, no crepitation.  Respiratory: no cough, sputum production, dyspnea, trepopnea, pleuritic pain, hemoptysis.  Heart: no syncope, irregularity, palpitations, angina, orthopnea, paroxysmal nocturnal dyspnea.  Vascular Venous: no " tenderness,edema, palpable cords, postphlebitic syndrome, skin changes or ulcerations.  Vascular Arterial: no distal ischemia, claudication, gangrene, neuropathic ischemic pain, skin ulcers, paleness or cyanosis.  GI: no dysphagia, odynophagia, no regurgitation, no heartburn,no indigestion,no nausea,no vomiting,no hematemesis ,no melena,no jaundice,no distention, no obstipation,no enterorrhagia,no proctalgia,no anal  lesions, nochanges in bowel habits. Abdominal pain as stated  : no frequency, hesitancy, hematuria, discharge, pain.  Musculoskeletal: no muscle or tendon pain or inflammation, joint pain, edema, functional limitation, fasciculations, mass.  Neurologic: no headache, seizures, alterations on Craneal nerves, no motor or senssory deficit, normal coordination, no alteration in memory, orientation, calculation,writting, verbal or written language.  Skin: no rashes, pruritus or localized lesions.  Psychiatric: no anxiety, depression, agitation, delusions, proper insight.        A comprehensive 14 point review of systems was performed and was negative except as mentioned.    Objective      Vitals:    07/08/17 0129 07/08/17 0905 07/08/17 0935 07/08/17 1406   BP: 134/76 116/68 116/68 110/66   BP Location: Left arm Left arm  Left arm   Patient Position: Lying Lying  Lying   Pulse: 58 67     Resp: 18 16  16   Temp: 98.7 °F (37.1 °C) 97.4 °F (36.3 °C)  98 °F (36.7 °C)   TempSrc: Oral Oral  Oral   SpO2: 95%   93%   Weight:       Height:         Current Status 5/31/2017   ECOG score 1       Physical Exam    GENERAL: Well-developed, well-nourished male in no acute distress.   SKIN: Warm, dry,healed lesions in le  HEAD: Normocephalic.   EYES: Pupils equal, round and reactive to light. EOMs intact. Conjunctivae normal.   EARS: Hearing intact.   NOSE: Septum midline. No excoriations or nasal discharge.   MOUTH: Tongue is well papillated; no stomatitis or ulcers. Lips normal.   THROAT: Oropharynx without lesions or  exudates.   NECK: Supple with good range of motion; no thyromegaly or masses, no JVD or bruits.   LYMPHATICS: No cervical, supraclavicular, axillary or inguinal adenopathy.   CHEST: Decreased breath sounds bilateral bases.   CARDIAC: Status post right video assisted thoracoscopy with healed incision sites.   ABDOMEN: Soft, nontender with no organomegaly or masses. No abdominal pain, palpable spleen 3 cm blcm  EXTREMITIES: Without palpable cords or positive Lucius's signs No clubbing, cyanosis or edema.   NEURO: No focal neurological deficits.         RECENT LABS:CT ABDOMEN AND PELVIS WITH IV CONTRAST      HISTORY: 77-year-old male with abnormal appearance of the portal vein on  a noncontrasted CT of the abdomen and pelvis performed earlier today.      TECHNIQUE: 3 mm images were obtained through the abdomen and pelvis  after the administration of IV contrast. Compared with the noncontrasted  CT abdomen and pelvis performed earlier today.      FINDINGS: There is extensive acute thrombosis of the main portal vein,  right and left portal veins, and nearly the entire splenic vein. The SMV  is patent. The spleen is enlarged and there are 3 small wedge-shaped  regions of decreased enhancement. There is a heterogeneous enhancement  pattern throughout the central aspect of the liver. There is a tiny  amount of perihepatic and perisplenic ascites and there is a small  amount of free fluid within the dependent aspect of the pelvis. No acute  abnormality is seen involving the pancreas, adrenals, or kidneys. Single  nonobstructing left renal stone is noted. No acute bowel abnormality is  seen. Prostatic brachytherapy seeds again noted.      IMPRESSION:  1. Extensive acute thrombosis of the portal vein, right and left  intrahepatic portal veins, and there is extensive acute thrombosis of  nearly the entire splenic vein. The SMV is patent.  2. Development of splenomegaly and there are 3 small evolving splenic  infarctions. Very  small volume of free fluid within the abdomen and  pelvis.      Discussed with Dr. Siddiqui.      AFP Tumor Marker   Order: [id number]   Status:  Final result   Visible to patient:  No (Not Released)      Ref Range & Units 2d ago     AFP Tumor Marker 0.0 - 8.3 ng/mL 1.2   Comments: Roche ECLIA methodology   Resulting Agency  [facility]   Narrative   Performed at:  [address] [place] [address]  [id number]  : Robel Del Valle PhD, Phone:  [phone]      Specimen Collected: 07/04/17 11:24 AM Last Resulted: 07/05/17                Cancer Antigen 19-9   Order: [id number]   Status:  Final result   Visible to patient:  No (Not Released)      Ref Range & Units 2d ago     CA 19-9 0 - 35 U/mL 16   Comments: Roche ECLIA methodology   Resulting Agency  [facility]   Narrative   Performed at:  [address] [place] [address]  [id number]  : Robel Del Valle PhD, Phone:  [phone]      Specimen Collected: 07/04/17 11:24 AM Last Resulted: 07/05/17  5:09 PM              Cardiolipin Antibody, IgA   Order: [id number]   Status:  Final result   Visible to patient:  No (Not Released)      Ref Range & Units 2d ago     Anticardiolipin IgA 0 - 11 APL U/mL <9   Comments:                           Negative:              <12                             Indeterminate:     12 - 20                             Low-Med Positive: >20 - 80                             High Positive:         >80   Resulting Agency  [facility]   Narrative   Performed at:  [address] [place] [address]  [id number]  : Robel Del Valle PhD, Phone:  [phone]      Specimen Collected: 07/04/17 11:24 AM Last Resulted: 07/05/17                Findings:upper gi endosocpy  Diffuse mild inflammation characterized by erythema was found in the entire examined stomach.  Type 2 gastroesophageal varices (GOV2, esophageal varices which extend along the fundus) with no bleeding were  found in the gastric  fundus.  Grade II varices were found in the lower third of the esophagus    Hematology WBC   Date Value Ref Range Status   07/08/2017 13.39 (H) 4.50 - 10.70 10*3/mm3 Final     RBC   Date Value Ref Range Status   07/08/2017 4.82 4.60 - 6.00 10*6/mm3 Final     Hemoglobin   Date Value Ref Range Status   07/08/2017 12.5 (L) 13.7 - 17.6 g/dL Final     Hematocrit   Date Value Ref Range Status   07/08/2017 39.7 (L) 40.4 - 52.2 % Final     Platelets   Date Value Ref Range Status   07/08/2017 193 140 - 500 10*3/mm3 Final      Assessment/Plan this patient has thrombophilia associated with FACTOR V Leiden mutation.  He has had extensive thrombosis in the failure in lower extremities he has had sagittal vein thrombosis he has had a chronic anticoagulation initiated with Coumadin and subsequently with a Xarelto.  The patient has been taking this Xarelto religiously according to the medical records are documented in the emergency room.  However submitted most is the fact that the patient has had abdominal pain subsided significantly for the last 2-3 months he has lost his appetite and he has lost 20 pounds in weight.  They and the patient has had in March of this year reassessment of previous malignancy specifically lung cancer having no evidence for recurrence.  On the thrombosis of the portal vein is an entity that can be associated with several malignancies in the gastrointestinal tract including pancreatic cancer as well as liver cancer.  The patient has developed a significant anemia related to iron deficiency and he was treated subsequently for these by  this incident the summer of 2016.  Therefore medically to 1 that he will be a seen is normally they thrombophilia associated with  Leiden mutation and also thrombophilia associated with some sort of malignancy.  Even though the radiological assessment of his CT scan shows nothing to suggest a hepatocellular carcinoma or a pancreatic cancer and makes it under  reasonably to proceed with tumor markers including a CA 19-9 as well as CEA and also the alpha-fetoprotein.    Given the history of prostate cancer and going to go ahead and proceed per PSA.  I'm going to repeat his thrombophilia labs including lupus anticoagulant and anticardiolipin antibody profile along with a C-reactive protein.  Thrombophilia CAN HAPPEN IN patient WITH Monoclonal protein.  For this reason I'm going to proceed with a serum protein ELECTROPHORESIS.  From the point of your treatment the patient is going to require continuation of the brain and eventually initiation of transition with Coumadin.  Coumadin will require to be follow-up in the office in the future down going to go ahead and make appointments to follow-up with Dr. JHAVERI the close future for pro times and so forth.    Eventually we'll continue related to repeat the CT scan of the abdomen to see if there resolution of the portal vein thrombosis and splenic vein thromboses and to see what happens with his enlarged spleen that is a congestive form of splenomegaly.  Finally the patient is stated that has not been lOng since he had a  colonoscopy.  Again the fact that he developed IRON deficiency anemia on blood thinner medication makes it to WONDER FOR source of  malignancy the gastrointestinal tract.    Discussed with dr LEMOS: looking for another reason of thrombophilia CEA and PSA normal, AFP and ca 19-9 pending, other labs pending; we agree into endoscopies at this point he has hemo positive stools, and on anticoagulants we need to know the source of bleeding.He will consult GI. Discussed with pt wife and daughter in detail.  On 7/1  7 the patient have to say it is felt today well he has minimal if any abdominal pain and he is ready to proceed today with his upper GI endoscopy.  His daughter has made the statement that the patient behavior has changed during the last say in several weeks to months his more aggressive his forgetful.  He  repetitions and she is worried that he has had NEW strokes.  And given the fact that he will have an endoscopy today I would proceed with an MRI scan of the brain tomorrow for to review the anatomy and see if indeed he has hadVascular events  Tomorrow we will may the transition from IV hip.  Subcutaneous Lovenox and subsequently the patient will be placed on Coumadin and he wishes will be glad to follow-up his pro time in the office in the close future.  This could be arranged by tomorrow.  We will review again his MRI tomorrow.  Review the laboratory parameters as stated above including a normal 50 to alpha-fetoprotein, normal CA 19-9, negative anticardiolipin antibody profile, therefore so far we have not found any other reason for thrombophilia the is a able to explain why he is having all the other problem is that he passed.  Obviously the upper GI endoscopy will be able to tell last something else say if anything else is happening in the gastrointestinal tract.  The patient had a colonoscopy not too long ago and there is no going to be another one done at this time.I saw his endoscopy report , high risk for anticoagulation and bleeding , he will need weekly cbc pro time and MD visit in 2 or 3 weeks, i will stop heparin today and transfer him to lovenox and coumadin, MRi brain done report pending in regard question of new strokes, discussed with nurse , pt and daughter    Plan: Continue Lovenox 60 mg subcutaneous twice a day    2.  Continue warfarin and keep INR between 2-3.  His INR jumped from 1.6-2.72 with one dose of Coumadin of 2.5 mg by mouth.  Today we will give him just 1 mg of Coumadin.  We will recheck PT/INR tomorrow.      3.  If patient is discharged then he has a follow-up appointment this but missed a for PT/INR check in our office with plans of doing it once a week in our office with nurse review and subsequently he follow-up with the Dr. magallanes  in 4 weeks.    Treasure Capone MD

## 2017-07-08 NOTE — PROGRESS NOTES
History:    This 77-year-old gentleman is currently doing good.  He has portal vein thrombosis.  He has thrombophilia related to factor V Leiden deficiency.  He has been started on Coumadin and his INR is currently therapeutic.    The patient has history of type 2 diabetes mellitus.  Most recent blood sugars have been a little bit low.  His diet is reasonable but he is not eating his complete meals.  His energy level has been improving.  His been ambulating about the unit.    Allergies  Review of patient's allergies indicates no known allergies.      Current Facility-Administered Medications:   •  dextrose (D50W) solution 25 g, 25 g, Intravenous, Q15 Min PRN, Landry Ho MD, 25 g at 07/08/17 0929  •  dextrose (D50W) solution 25 g, 25 g, Intravenous, Q15 Min PRN, Ceferino Gillespie MD, 25 g at 07/08/17 0751  •  dextrose (GLUTOSE) oral gel 15 g, 15 g, Oral, Q15 Min PRN, Landry Ho MD  •  dextrose (GLUTOSE) oral gel 15 g, 15 g, Oral, Q15 Min PRN, Ceferino Gillespie MD  •  donepezil (ARICEPT) tablet 5 mg, 5 mg, Oral, Nightly, Ceferino Gillespie MD, 5 mg at 07/07/17 2058  •  enoxaparin (LOVENOX) syringe 60 mg, 60 mg, Subcutaneous, Q12H, Morgan Navas MD, 60 mg at 07/08/17 0115  •  glucagon (human recombinant) (GLUCAGEN DIAGNOSTIC) injection 1 mg, 1 mg, Subcutaneous, Q15 Min PRN, Landry Ho MD  •  glucagon (human recombinant) (GLUCAGEN DIAGNOSTIC) injection 1 mg, 1 mg, Subcutaneous, Q15 Min PRN, Ceferino Gillespie MD  •  HYDROcodone-acetaminophen (NORCO) 5-325 MG per tablet 1 tablet, 1 tablet, Oral, Q4H PRN, Ceferino Gillespie MD, 1 tablet at 07/08/17 0443  •  insulin aspart (novoLOG) injection 0-9 Units, 0-9 Units, Subcutaneous, 4x Daily With Meals & Nightly, Ceferino Gillespie MD, 4 Units at 07/07/17 2059  •  insulin detemir (LEVEMIR) injection 35 Units, 35 Units, Subcutaneous, Nightly, Ceferino Gillespie MD, 35 Units at 07/07/17 2058  •  lactated ringers infusion 1,000 mL, 1,000 mL, Intravenous, Continuous PRN, Tim  "Darnell Xie MD, Stopped at 07/06/17 1456  •  levETIRAcetam (KEPPRA) tablet 500 mg, 500 mg, Oral, BID, Ceferino Gillespie MD, 500 mg at 07/08/17 0935  •  morphine injection 1 mg, 1 mg, Intravenous, Q4H PRN **AND** naloxone (NARCAN) injection 0.4 mg, 0.4 mg, Intravenous, Q5 Min PRN, Ceferino Gillespie MD  •  nadolol (CORGARD) tablet 20 mg, 20 mg, Oral, Q24H, Tim Xie MD, 20 mg at 07/08/17 0935  •  oxybutynin (DITROPAN) tablet 5 mg, 5 mg, Oral, Daily, Ceferino Gillespie MD, 5 mg at 07/08/17 0935  •  pantoprazole (PROTONIX) EC tablet 40 mg, 40 mg, Oral, Q AM, Ceferino Gillespie MD, 40 mg at 07/08/17 0443  •  sodium chloride 0.9 % flush 1-10 mL, 1-10 mL, Intravenous, PRN, Ceferino Gillespie MD  •  sodium chloride 0.9 % flush 10 mL, 10 mL, Intravenous, PRN, Sher Siddiqui MD  •  traZODone (DESYREL) tablet 50 mg, 50 mg, Oral, Nightly, Ceferino Gillespie MD, 50 mg at 07/07/17 2058  •  valsartan (DIOVAN) tablet 160 mg, 160 mg, Oral, Q24H, Ceferino Gillespie MD, 160 mg at 07/08/17 0935  •  warfarin (COUMADIN) tablet 2.5 mg, 2.5 mg, Oral, Daily, Ceferino Gillespie MD, 2.5 mg at 07/07/17 1909    /68  Pulse 67  Temp 97.4 °F (36.3 °C) (Oral)   Resp 16  Ht 69\" (175.3 cm)  Wt 158 lb 12.8 oz (72 kg)  SpO2 95%  BMI 33.37 kg/m2    Physical Exam     Appearance: Normally developed and nourished 77-year-old gentleman.  He is in no distress.     HEENT: Normocephalic atraumatic.  Pupils round and equal.  Pharynx clear and mucous murmurs moist.     Neck: Without adenopathy JVD or thyromegaly.     Lungs: Clear to auscultation percussion.  No wheezes, rales or rhonchi.     Heart: Regular rate and rhythm.     Abdomen: Normal active bowel sounds.  No rebound test or guarding.  Spleen was palpable.     Extremities: No calf tenderness.  No clubbing, cyanosis or edema.     Neurologic: Nonfocal.  No confusion this morning.  Nonfocal.  No asterixis      Lab Results (last 24 hours)     Procedure Component Value Units Date/Time    POC Glucose " Fingerstick [[id number]]  (Abnormal) Collected:  07/07/17 1147    Specimen:  Blood Updated:  07/07/17 1148     Glucose 134 (H) mg/dL     Narrative:       Meter: [id number] : [id number] Jono Griffin    aPTT [[id number]]  (Abnormal) Collected:  07/07/17 1204    Specimen:  Blood Updated:  07/07/17 1238     PTT 78.4 (H) seconds     Protime-INR [[id number]]  (Abnormal) Collected:  07/07/17 1204    Specimen:  Blood Updated:  07/07/17 1238     Protime 19.2 (H) Seconds      INR 1.69 (H)    POC Glucose Fingerstick [[id number]]  (Normal) Collected:  07/07/17 1742    Specimen:  Blood Updated:  07/07/17 1745     Glucose 130 mg/dL     Narrative:       Meter: [id number] : [id number] Syl[place]    POC Glucose Fingerstick [[id number]]  (Abnormal) Collected:  07/07/17 2049    Specimen:  Blood Updated:  07/07/17 2051     Glucose 221 (H) mg/dL     Narrative:       Meter: [id number] : [id number] Pieter Zaragoza    aPTT [[id number]]  (Abnormal) Collected:  07/08/17 0652    Specimen:  Blood Updated:  07/08/17 0733     PTT 69.5 (H) seconds     Protime-INR [[id number]]  (Abnormal) Collected:  07/08/17 0652    Specimen:  Blood Updated:  07/08/17 0733     Protime 27.9 (H) Seconds      INR 2.72 (H)    POC Glucose Fingerstick [[id number]]  (Abnormal) Collected:  07/08/17 0745    Specimen:  Blood Updated:  07/08/17 0749     Glucose 41 (C) mg/dL     Narrative:       Repeat Test Meter: [id number] : [id number] Annika Loaiza    POC Glucose Fingerstick [[id number]]  (Abnormal) Collected:  07/08/17 0747    Specimen:  Blood Updated:  07/08/17 0749     Glucose 39 (C) mg/dL     Narrative:       Confirmed by Repeat Meter: [id number] : [id number] Annika Loaiza    CBC & Differential [[id number]] Collected:  07/08/17 0652    Specimen:  Blood Updated:  07/08/17 0758    Narrative:       The following orders were created for panel order CBC & Differential.  Procedure                               Abnormality         Status                      ---------                               -----------         ------                     Scan Slide[[id number]]                                       Final result               CBC Auto Differential[[id number]]        Abnormal            Final result                 Please view results for these tests on the individual orders.    CBC Auto Differential [[id number]]  (Abnormal) Collected:  07/08/17 0652    Specimen:  Blood Updated:  07/08/17 0758     WBC 13.39 (H) 10*3/mm3      RBC 4.82 10*6/mm3      Hemoglobin 12.5 (L) g/dL      Hematocrit 39.7 (L) %      MCV 82.4 fL      MCH 25.9 (L) pg      MCHC 31.5 (L) g/dL      RDW 15.8 (H) %      RDW-SD 47.8 fl      MPV -- fL       .        Platelets 193 10*3/mm3      Neutrophil % 77.6 (H) %      Lymphocyte % 8.3 (L) %      Monocyte % 10.1 %      Eosinophil % 2.4 %      Basophil % 0.5 %      Immature Grans % 1.1 (H) %      Neutrophils, Absolute 10.39 (H) 10*3/mm3      Lymphocytes, Absolute 1.11 10*3/mm3      Monocytes, Absolute 1.35 (H) 10*3/mm3      Eosinophils, Absolute 0.32 10*3/mm3      Basophils, Absolute 0.07 10*3/mm3      Immature Grans, Absolute 0.15 (H) 10*3/mm3      nRBC 0.0 /100 WBC     Scan Slide [[id number]] Collected:  07/08/17 0652    Specimen:  Blood Updated:  07/08/17 0758     Ovalocytes Mod/2+     WBC Morphology Normal     Large Platelets Slight/1+    POC Glucose Fingerstick [[id number]]  (Abnormal) Collected:  07/08/17 0800    Specimen:  Blood Updated:  07/08/17 0801     Glucose 195 (H) mg/dL     Narrative:       Meter: [id number] : [id number] Annika Loaiza        Chest x-ray PA and lateral: Within normal limits.  No cardiomegaly, effusions or infiltrate    EKG: sinus rhythm with PACs.  No acute findings    Imp:       1.  Thrombophilia with portal vein thrombosis.  The patient has factor V Leiden.   evaluation has not revealed  a malignancy     2.  Type 2 diabetes mellitus.   patient having some hypoglycemia     3.   Esophageal varices related to the portal  vein thrombosis.       4.  Essential hypertension.     5.  Hyperlipidemia.     6.  History of past adenocarcinoma of the lung and prostate cancer.     7.  Stool heme positive on admission.  He has evidence of iron deficiency.     8.  Chronic renal insufficiency.  Stage III B        Plan:    Hold Coumadin today.    Decrease Levemir.    Discontinue supplemental oxygen, discontinue IV fluids    Consider discharge tomorrow    Ceferino Gillespie MD  7/8/2017  10:47 AM

## 2017-07-08 NOTE — PLAN OF CARE
Problem: Patient Care Overview (Adult)  Goal: Plan of Care Review  Outcome: Ongoing (interventions implemented as appropriate)    07/08/17 1445   Coping/Psychosocial Response Interventions   Plan Of Care Reviewed With patient   Patient Care Overview   Progress improving   Outcome Evaluation   Outcome Summary/Follow up Plan pt to discharge to home pt really needs rehab will talk with MD and will evaluate.         Problem: Skin Integrity Impairment, Risk/Actual (Adult)  Goal: Skin Integrity/Wound Healing  Outcome: Ongoing (interventions implemented as appropriate)    Problem: Fall Risk (Adult)  Goal: Identify Related Risk Factors and Signs and Symptoms  Outcome: Ongoing (interventions implemented as appropriate)  Goal: Absence of Falls  Outcome: Ongoing (interventions implemented as appropriate)    Problem: Diabetes, Type 2 (Adult)  Goal: Signs and Symptoms of Listed Potential Problems Will be Absent or Manageable (Diabetes, Type 2)  Outcome: Ongoing (interventions implemented as appropriate)

## 2017-07-08 NOTE — NURSING NOTE
Pt was found to have a B/S of 41 gave a amp of D50 and orange juice and apple juice pt came up to 129 notified Pharmacy and they will send up more for if pt declines again with B/S. Will cont to monitor.

## 2017-07-09 NOTE — PROGRESS NOTES
History:    This is a 77-year-old gentleman.  He has portal vein thrombosis.  He has a history of factor V Leiden.  He also has diabetes mellitus and a distant history of both and no CA of the lung and prostate cancer.    The patient is doing good.  Blood sugars have been in the 200 range.  His appetite has been poor.    The patient denies shortness of air or coughing but he does get hypoxemic when ever he is off his 2 L of oxygen.  He has a distant history of cigarette abuse.  He has a 30-40-pack-year history.  He hasn't smoked in many years.    The patient was treated with heparin.  He has been transitioned to Coumadin.  After receiving a small amount of Coumadin his INR has gone out to almost 4.  He has a low albumin.  He likely has significant hepatic dysfunction.    Allergies  Review of patient's allergies indicates no known allergies.      Current Facility-Administered Medications:   •  dextrose (D50W) solution 25 g, 25 g, Intravenous, Q15 Min PRN, Landry Ho MD, 25 g at 07/08/17 0929  •  dextrose (D50W) solution 25 g, 25 g, Intravenous, Q15 Min PRN, Ceferino Gillespie MD, 25 g at 07/08/17 0751  •  dextrose (GLUTOSE) oral gel 15 g, 15 g, Oral, Q15 Min PRN, Landry Ho MD  •  dextrose (GLUTOSE) oral gel 15 g, 15 g, Oral, Q15 Min PRN, Ceferino Gillespie MD  •  donepezil (ARICEPT) tablet 5 mg, 5 mg, Oral, Nightly, Ceferino Gillespie MD, 5 mg at 07/08/17 2109  •  enoxaparin (LOVENOX) syringe 60 mg, 60 mg, Subcutaneous, Q12H, Morgan Navas MD, 60 mg at 07/09/17 0001  •  glucagon (human recombinant) (GLUCAGEN DIAGNOSTIC) injection 1 mg, 1 mg, Subcutaneous, Q15 Min PRN, Landry Ho MD  •  glucagon (human recombinant) (GLUCAGEN DIAGNOSTIC) injection 1 mg, 1 mg, Subcutaneous, Q15 Min PRN, Ceferino Gillespie MD  •  HYDROcodone-acetaminophen (NORCO) 5-325 MG per tablet 1 tablet, 1 tablet, Oral, Q4H PRN, Ceferino Gillespie MD, 1 tablet at 07/08/17 7184  •  insulin aspart (novoLOG) injection 0-9 Units, 0-9 Units,  "Subcutaneous, 4x Daily With Meals & Nightly, Ceferino Gillespie MD, 2 Units at 07/09/17 0842  •  insulin detemir (LEVEMIR) injection 30 Units, 30 Units, Subcutaneous, Nightly, Ceferino Gillespie MD  •  levETIRAcetam (KEPPRA) tablet 500 mg, 500 mg, Oral, BID, Ceferino Gillespie MD, 500 mg at 07/09/17 0843  •  morphine injection 1 mg, 1 mg, Intravenous, Q4H PRN **AND** naloxone (NARCAN) injection 0.4 mg, 0.4 mg, Intravenous, Q5 Min PRN, Ceferino Gillespie MD  •  nadolol (CORGARD) tablet 20 mg, 20 mg, Oral, Q24H, Tim Xie MD, 20 mg at 07/09/17 0843  •  oxybutynin (DITROPAN) tablet 5 mg, 5 mg, Oral, Daily, Ceferino Gillespie MD, 5 mg at 07/09/17 0843  •  pantoprazole (PROTONIX) EC tablet 40 mg, 40 mg, Oral, Q AM, Ceferino Gillespie MD, 40 mg at 07/09/17 0844  •  sodium chloride 0.9 % flush 1-10 mL, 1-10 mL, Intravenous, PRN, Ceferino Gillespie MD  •  sodium chloride 0.9 % flush 10 mL, 10 mL, Intravenous, PRN, Sher Siddiqui MD  •  traZODone (DESYREL) tablet 50 mg, 50 mg, Oral, Nightly, Ceferino Gillespie MD, 50 mg at 07/08/17 2109  •  valsartan (DIOVAN) tablet 160 mg, 160 mg, Oral, Q24H, Ceferino Gillespie MD, 160 mg at 07/09/17 0844    /84 (BP Location: Left arm, Patient Position: Lying)  Pulse 74  Temp 98.8 °F (37.1 °C) (Oral)   Resp 16  Ht 69\" (175.3 cm)  Wt 158 lb 12.8 oz (72 kg)  SpO2 93%  BMI 33.37 kg/m2    Physical Exam     I had removed the patient's nasal cannula oxygen.  His O2 sat quickly dropped to 88    Appearance: Normally developed and nourished 77-year-old gentleman.  He is in no distress.     HEENT: Normocephalic atraumatic.  Pupils round and equal.  Pharynx clear and mucous murmurs moist.     Neck: Without adenopathy JVD or thyromegaly.     Lungs: Clear to auscultation percussion.  No wheezes, rales or rhonchi.     Heart: Regular rate and rhythm.     Abdomen: Normal active bowel sounds.  No rebound test or guarding.  Spleen was palpable.     Extremities: No calf tenderness.  No clubbing, cyanosis or edema.   "   Neurologic: Nonfocal.  No confusion this morning.  Nonfocal.  No asterixis      Lab Results (last 24 hours)     Procedure Component Value Units Date/Time    POC Glucose Fingerstick [[id number]]  (Abnormal) Collected:  07/08/17 1125    Specimen:  Blood Updated:  07/08/17 1128     Glucose 220 (H) mg/dL     Narrative:       Meter: [id number] : [id number] Dilling Fadia    POC Glucose Fingerstick [[id number]]  (Abnormal) Collected:  07/08/17 1758    Specimen:  Blood Updated:  07/08/17 1800     Glucose 217 (H) mg/dL     Narrative:       Meter: [id number] : [id number] Dilling Fadia    POC Glucose Fingerstick [[id number]]  (Abnormal) Collected:  07/08/17 2129    Specimen:  Blood Updated:  07/08/17 2130     Glucose 184 (H) mg/dL     Narrative:       Meter: [id number] : [id number] INDERJIT HOWARD    aPTT [[id number]]  (Abnormal) Collected:  07/09/17 0618    Specimen:  Blood Updated:  07/09/17 0711     PTT 77.9 (H) seconds     Protime-INR [[id number]]  (Abnormal) Collected:  07/09/17 0618    Specimen:  Blood Updated:  07/09/17 0711     Protime 37.3 (H) Seconds      INR 3.93 (H)    Comprehensive Metabolic Panel [[id number]]  (Abnormal) Collected:  07/09/17 0618    Specimen:  Blood Updated:  07/09/17 0730     Glucose 167 (H) mg/dL      BUN 30 (H) mg/dL      Creatinine 1.99 (H) mg/dL      Sodium 139 mmol/L      Potassium 4.3 mmol/L      Chloride 104 mmol/L      CO2 21.3 (L) mmol/L      Calcium 8.1 (L) mg/dL      Total Protein 5.6 (L) g/dL      Albumin 2.20 (L) g/dL      ALT (SGPT) 20 U/L      AST (SGOT) 36 U/L      Alkaline Phosphatase 142 (H) U/L      Total Bilirubin 0.5 mg/dL      eGFR Non African Amer 33 (L) mL/min/1.73      Globulin 3.4 gm/dL      A/G Ratio 0.6 g/dL      BUN/Creatinine Ratio 15.1     Anion Gap 13.7 mmol/L     Narrative:       The MDRD GFR formula is only valid for adults with stable renal function between ages 18 and 70.    CBC & Differential [[id number]] Collected:  07/09/17 0618    Specimen:  Blood  Updated:  07/09/17 0752    Narrative:       The following orders were created for panel order CBC & Differential.  Procedure                               Abnormality         Status                     ---------                               -----------         ------                     Scan Slide[[id number]]                                       Final result               CBC Auto Differential[[id number]]        Abnormal            Final result                 Please view results for these tests on the individual orders.    CBC Auto Differential [[id number]]  (Abnormal) Collected:  07/09/17 0618    Specimen:  Blood Updated:  07/09/17 0752     WBC 11.06 (H) 10*3/mm3      RBC 4.84 10*6/mm3      Hemoglobin 12.6 (L) g/dL      Hematocrit 39.8 (L) %      MCV 82.2 fL      MCH 26.0 (L) pg      MCHC 31.7 (L) g/dL      RDW 16.0 (H) %      RDW-SD 48.4 fl      Platelets 202 10*3/mm3      Neutrophil % 74.8 %      Lymphocyte % 10.9 (L) %      Monocyte % 10.0 %      Eosinophil % 2.7 %      Basophil % 0.6 %      Immature Grans % 1.0 (H) %      Neutrophils, Absolute 8.26 (H) 10*3/mm3      Lymphocytes, Absolute 1.21 10*3/mm3      Monocytes, Absolute 1.11 10*3/mm3      Eosinophils, Absolute 0.30 10*3/mm3      Basophils, Absolute 0.07 10*3/mm3      Immature Grans, Absolute 0.11 (H) 10*3/mm3      nRBC 0.0 /100 WBC     Scan Slide [[id number]] Collected:  07/09/17 0618    Specimen:  Blood Updated:  07/09/17 0752     Anisocytosis Mod/2+     Spherocytes Slight/1+     WBC Morphology Normal     Platelet Morphology Normal          Imp:       1.  Thrombophilia with portal vein thrombosis.  The patient has factor V Leiden.   evaluation has not revealed  a malignancy.  INR is currently 3.9     2.  Type 2 diabetes mellitus.        3.   Esophageal varices related to the portal vein thrombosis.  3 recent healed duodenal ulcers    4.  Hypoxemia likely related to atelectasis +/- COPD    5.  Hepatic dysfunction related to portal vein thrombosis     6.   Essential hypertension.     7.  Hyperlipidemia.     8.  History of past adenocarcinoma of the lung and prostate cancer.     9.  Stool heme positive on admission.  He has evidence of iron deficiency.     10.  Chronic renal insufficiency.  Stage III B      Plan:    I do not believe this patient's a good candidate to be discharged home.  He has too many active medical problems.  He is too weak.    I will contact [facility] care concerning rehabilitation placement.    We can likely stop his Lovenox.  We will hold Coumadin for now.  We will watch his PT/INR closely.    I'm going to start incentive spirometry and check pulmonary function studies.    We will continue physical therapy.    Ceferino Gillespie MD  7/9/2017  10:01 AM

## 2017-07-09 NOTE — PROGRESS NOTES
Subjective .  Modest improvement in fatigue , worsening foot pain persisting    REASONS FOR FOLLOWUP:    1. Previous assessment per superior sagittal sinus thrombosis, left frontal intracerebral hemorrhage, acute calf vein thrombosis, hypercoagulable assessment positive for heterozygosity for factor V 5 Leiden gene mutation.   2. Ongoing anticoagulation with Coumadin followed by Dr. Gillespie.   3. Admission on 08/04/2014 with severe right knee pain, spontaneous infusion, calf vein thrombosis despite therapeutic anticoagulation.   4. Repeat consultation 08/05/2014 leading to chest CT revealing irregula r nodule in the right apex indeterminate ? malignancy.   5. Thoracic surgery consultation, Neurovascular surgery consultation obtained, IVC filter placed, exploratory right video assisted fluoroscopy with wedge resection right upper lobe lymphadenectomy 08/15/ 2014, pathology consistent with primary lung adenocarcinoma, stage [id number] stage IA, EGFR mutation, positive for Exon 19 mutation, (potentially responsive to EGFR tyrosine kinase therapy).   6. The patient was seen in the office 09/23/2014, adjuvant therapy not felt necessary, IVC filter to be removed, continue Xarelto therapy thereafter recommended  7. Patient reviewed December 21, 2016 with ongoing anemia-microcytic, hypochromic  8. Determination of considerable iron deficiency and B12 deficiency?  Pernicious anemia, replacement therapy is initiated  9. Patient reviewed March 8, considerable improvement per anemia though iron deficiency persists, patient with progressive foot pain, foot lesions developing, follow-up scan obtained  10. Patient reviewed April 3, CT scans negative, anemia resolved, potential lower extremity vasculopathy-dermatologic assessment pursued  11. Patient reviewed May 31, 2017, no significant improvement per lower extremity vasculopathy    HISTORY OF PRESENT ILLNESS:  The patient is a 77 y.o. year old male.  This patient was admitted to  the hospital intensive care unit yesterday with a at least 3 days history of persistent abdominal pain and 67/10 in the periumbilical area and epigastric area continues associated with mild nausea refused they have 3 N1 of dictation.  He has no abdominal distention no fevers no chills no diaphoresis and no retrosternal chest pain.  The time of admission through the emergency room he was found to have significant abnormalities in liver function tests filter assessment documented to a CT scan of the abdomen to the patient had extensive portal vein thrombosis.  He was also noticed that the patient splenomegaly.  Thrombosis of the splenic vein.  Obviously the patient was admitted to the intensive care unit he was placed on heparin and actually his abdominal pain has substantially subsided since then to the point of her solution and this is starting to have a and regular diet today.  The patient states today that the abdominal pain is no new has been ongoing on for at least 2to3 months Mild and intensity progressive to the point that he got yesterday.  Also he has lost his appetite take completely and he has lost 20 pounds of weight in the last 2-3 months.  Again he has not had any fever or chills he doesn't have any cough or sputum production he has not had any shortness of breath.  He denies any changes in his bowel habits.  He denies any hematemesis or melena or interim range up.  He denies any hematuria.  He hasn't had any frequency to urinate and he denies any recent urinary tract infections.  Ibuprofen skin lesions that were documented before were treated by his primary internal medicine physician with resolution.  No new lesions evolving.    History of Past  Illness     Mr. Gonzalez is a 77-year-old male who we had initially seen in consultation 02/04 through 02/14/2002 having presented at that point with a superior sagittal sinus thrombosis and left frontal intracerebral hemorrhage. Evaluation thereafter included a  hypercoagulable assessment when he was found also to have an acute calf vein thrombosis and spontaneous calf vein thrombosis on the left . The patient fortunately responded well to rehabilitation and anticoagulation. He was eventually placed on Coumadin long term and hypercoagulable assessment had revealed findings consistent with heterozygosity for factor V Leiden gene mutation. The patie nt did not have followup with us, though that is not exactly certain why.       Mr. Gonzalez was later admitted 08/04/2014 through Dr. Gillespie. The patient had gone to [employer] and while standing in [employer] he noticed his right leg starting to cramp. He began to have noticeable limping thereafter and then when knee pain became so severe his wife called 911 and he was brought to [facility] emergency room. In the ER he was found to have calf vein thrombosis involving the right leg as well as a swollen k n ee with effusion. He was admitted to be seen by Orthopedics and was found to incidentally have a prothrombin time 3.1 and thus we were asked to see him for his thrombosis despite adequate anticoagulation. At that point, he was seen by this physician and a s a result of there being concern about additional factor such as a malignancy, a CT chest, abdomen and pelvis was obtained 08/05/2014. This revealed unexpectedly an irregular 2.0 x 1.1 cm lesion in the right apical region. This was concerning for a malig n jorge. He also had non-obstructing left renal calculus and extensive lumbar spinal degenerative disease. The patient was seen by Orthopedics again with a knee tap. (Followup is still remaining concerning this). The patient was also seen by Thoracic surgeangely joyner . Please note that Dopplers 08/06/2014 revealed a subacute venous thrombosis in the right popliteal vein as well as subacute thrombus in the right calf vein. The patient was seen by Dr. Meyer per Thoracic surgery and a PET scan was obtained showing the no d ule in  right lung apex to demonstrate low level activity somewhat indeterminate with an SUV of 2.0. No other abnormalities were present. It was felt that the patient should proceed to surgical resection. An IVC filter was placed prior to surgery 08/13/201 4 by Dr. Pastor Trinh. PFTs revealed a moderate airway restriction and after discussion on 08/15/2014 the patient underwent exploratory bronchoscopy, exploratory right video assisted thoracoscopy with wedge resection, right upper lobe and lymphadenectom y . The patient remains hospitalized at this point to have the surgery rather than return for subsequent surgery. Pathology per surgical specimen revealed a primary lung adenocarcinoma, demonstrating a lepidic growth pattern. R4, station 7 and R10 nodes wer e all negative for disease. The tumor size was 2.1 x 1.3 x 1.2 unifocal adenocarcinoma. His pathologic staging was a pT1b pN0M0- stage IA. Now available also at the time of this dictation is the finding that the tumor was EGFR mutation positive with a le s ion positive for E746 [id number] mutation, Exon 19. These are reported to correlate with responsiveness to EGFR tyrosine kinase inhibitor therapies. The patient was seen by Dr. Meyer postoperative and is doing well and is also being seen by Dr. Trinh with consideration of removal of his IVC filter.       The patient now presents back to our practice for review of all of this. We discussed his time line up to this point. In a long discussion it is felt that adjuvant chemotherapy is not warranted in Mr. Gonzalez's care. Reasonably followup is however likely with at least a chest x-ray on a yearly basis possibly through Dr. Gillespie.       The patient's had follow-up through Dr. CHRISTIAN Walker since his last visit here as well as reviews to Dr. Meyer.  This includes evidently a trial of oral iron when the patient was found to be anemic previously as well as recognition of the patient's kidney function-CKD 3.  She also was seen in  emergency room in June of this year after falling from a ladder with laboratory studies revealing anemia with hemoglobin 8.5, hematocrit 30.5 and MCV that Dr. 63.3 with MCH of 17.6 and an MCHC of 27.9, platelet count of 333,000 with normal differential.  In reviewing the patient's blood counts he is definitely developed microcytosis and hypochromia since June and, as such, when immediately as concerned about iron deficiency as an underlying issue.  He also has chronic leukocytosis that appears to be unchanged from previous.  We discussed IV iron as a treatment to address the immediate issue and thereafter try to determine the source of blood loss if indeed iron deficiency is documented.     The patient went on to be treated with both IV Feraheme ×2 on December 23 and December 30 as well as be given injections of B12 both visits.  As he seen back today January 30 he is feeling some better and his wife has also noticed a difference.  We plan to continue the B12 loading over the next several weeks as well as assessing him for pernicious anemia laboratories today.  The patient is now seen back March 08, 2017.  While he's had some improvement in his energy level he still has pain in his feet which has progressed associated with a mildly raised, violaceous group of lesions involving his calf and left foot have developed.  He indicates he is to see a podiatrist in the near future and with the findings of pernicious anemia, ongoing iron deficiency, and unexplained rash developing in the setting of a history of non-small cell lung cancer I requested a PET/CT-?  Paraneoplasia or even new malignant process.    The patient initially was to undergo a PET/CT but this was declined.  A CT of chest and pelvis was obtained with no evidence of metastatic disease identified.  The patient hematologically went on to further improve but has, unfortunately, considerable pain in his lower extremities associated with a rash that has a  partially vasculitic appearance.  The areas in question are erythematous, serpiginous, mildly raised, and very tender to palpation.  He's been seen by podiatry and treated topically without effect.   The patient was thereafter seen evidently by both dermatology and vascular surgery.  Unfortunately he still has significant pain though this is managed in part by current use of pain medications with modest use thus far.  He is encouraged to use this more frequently and plans are to contact Dr. CHRISTIAN Walker as well.    Interval History: Patient's INR today is 3.93.  He received only 2 doses of Coumadin so far.  Day before yesterday he received 2.5 mg by mouth ×1 dose.  Yesterday he received, Coumadin 1 mg.  His INR went up significantly from 2.72-3.93.  He likely is not eating very well.  We will need to hold Lovenox.  And Coumadin.  Patient has decreased by mouth intake and likely causing worsening INR.  Patient is very weak and will go to rehabilitation.    Past Medical History:   Diagnosis Date   • Arthralgia    • Arthritis    • Back pain    • Chronic mixed headache syndrome    • Deep venous thrombosis of right popliteal vein     of right knee hemarthrosis   • Diabetes mellitus    • Diverticulosis    • DJD (degenerative joint disease)    • Factor V Leiden    • GERD (gastroesophageal reflux disease)    • Hypercholesterolemia    • Hypertension    • Intracerebral hemorrhage    • Lumbar spinal stenosis    • Muscular aches    • Neck pain    • Non-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   • Thrombosis     Lower extremity       ONCOLOGIC HISTORY:  (History from previous dates can be found in the separate document.)    No current facility-administered medications on file prior to encounter.      Current Outpatient Prescriptions on File Prior to  "Encounter   Medication Sig Dispense Refill   • Calcium Carbonate-Vitamin D (CALCIUM-D) 600-400 MG-UNIT tablet Take 1 tablet by mouth daily.     • carvedilol (COREG) 12.5 MG tablet      • cilostazol (PLETAL) 50 MG tablet      • clotrimazole-betamethasone (LOTRISONE) 1-0.05 % cream      • HYDROcodone-acetaminophen (NORCO) 5-325 MG per tablet Take 1 tablet by mouth every 6 (six) hours as needed.     • Insulin Syringe-Needle U-100 (RELION INSULIN SYR 0.5ML/31G) 31G X 5/16\" 0.5 ML misc      • irbesartan-hydrochlorothiazide (AVALIDE) 300-12.5 MG tablet Take 1 tablet by mouth daily.     • pravastatin (PRAVACHOL) 80 MG tablet Take  by mouth daily.     • rivaroxaban (XARELTO) tablet Take 20 mg by mouth daily with dinner.     • vitamin B-6 (PYRIDOXINE) 100 MG tablet Take 100 mg by mouth.         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     Social History Main Topics   • Smoking status: Former Smoker     Packs/day: 1.00     Years: 20.00     Quit date: 2/8/1996   • Smokeless tobacco: Not on file   • Alcohol use No   • Drug use: No   • Sexual activity: No     Other Topics Concern   • Not on file     Social History Narrative         Cancer-related family history includes Colon cancer in his mother; Lung cancer (age of onset: 77) in his brother.     Review of Systems    General: no fever, chills,significant fatigue,20 lb loss weight changes, and lack of appetite.  Eyes: no epiphora, xerophthalmia,conjunctivitis, pain, glaucoma, blurred vision, blindness, secretion, photophobia, proptosis, diplopia.  Ears: no otorrhea, tinnitus, otorrhagia, deafness, pain, vertigo.  Nose: no rhinorrhea, epistaxis, alteration in perception of odors, " sinuses pressure.  Mouth: no alteration in gums or teeth,  ulcers, no difficulty with mastication or deglut ion, no odynophagia.  Neck: no masses or pain, no thyroid alterations, no pain in muscles or arteries, no carotid odynia, no crepitation.  Respiratory: no cough, sputum production, dyspnea, trepopnea, pleuritic pain, hemoptysis.  Heart: no syncope, irregularity, palpitations, angina, orthopnea, paroxysmal nocturnal dyspnea.  Vascular Venous: no tenderness,edema, palpable cords, postphlebitic syndrome, skin changes or ulcerations.  Vascular Arterial: no distal ischemia, claudication, gangrene, neuropathic ischemic pain, skin ulcers, paleness or cyanosis.  GI: no dysphagia, odynophagia, no regurgitation, no heartburn,no indigestion,no nausea,no vomiting,no hematemesis ,no melena,no jaundice,no distention, no obstipation,no enterorrhagia,no proctalgia,no anal  lesions, nochanges in bowel habits. Abdominal pain as stated  : no frequency, hesitancy, hematuria, discharge, pain.  Musculoskeletal: no muscle or tendon pain or inflammation, joint pain, edema, functional limitation, fasciculations, mass.  Neurologic: no headache, seizures, alterations on Craneal nerves, no motor or senssory deficit, normal coordination, no alteration in memory, orientation, calculation,writting, verbal or written language.  Skin: no rashes, pruritus or localized lesions.  Psychiatric: no anxiety, depression, agitation, delusions, proper insight.        A comprehensive 14 point review of systems was performed and was negative except as mentioned.    Objective      Vitals:    07/08/17 1406 07/08/17 2100 07/09/17 0115 07/09/17 0837   BP: 110/66 119/71 109/65 115/84   BP Location: Left arm Left arm Left arm Left arm   Patient Position: Lying Sitting Lying Lying   Pulse:  63 64 74   Resp: 16 16 16 16   Temp: 98 °F (36.7 °C) 97 °F (36.1 °C) 97.9 °F (36.6 °C) 98.8 °F (37.1 °C)   TempSrc: Oral Oral Oral Oral   SpO2: 93% 90% 94% 93%   Weight:        Height:         Current Status 5/31/2017   ECOG score 1       Physical Exam    GENERAL: Well-developed, well-nourished male in no acute distress.   SKIN: Warm, dry,healed lesions in le  HEAD: Normocephalic.   EYES: Pupils equal, round and reactive to light. EOMs intact. Conjunctivae normal.   EARS: Hearing intact.   NOSE: Septum midline. No excoriations or nasal discharge.   MOUTH: Tongue is well papillated; no stomatitis or ulcers. Lips normal.   THROAT: Oropharynx without lesions or exudates.   NECK: Supple with good range of motion; no thyromegaly or masses, no JVD or bruits.   LYMPHATICS: No cervical, supraclavicular, axillary or inguinal adenopathy.   CHEST: Decreased breath sounds bilateral bases.   CARDIAC: Status post right video assisted thoracoscopy with healed incision sites.   ABDOMEN: Soft, nontender with no organomegaly or masses. No abdominal pain, palpable spleen 3 cm blcm  EXTREMITIES: Without palpable cords or positive Lucius's signs No clubbing, cyanosis or edema.   NEURO: No focal neurological deficits.         RECENT LABS:CT ABDOMEN AND PELVIS WITH IV CONTRAST      HISTORY: 77-year-old male with abnormal appearance of the portal vein on  a noncontrasted CT of the abdomen and pelvis performed earlier today.      TECHNIQUE: 3 mm images were obtained through the abdomen and pelvis  after the administration of IV contrast. Compared with the noncontrasted  CT abdomen and pelvis performed earlier today.      FINDINGS: There is extensive acute thrombosis of the main portal vein,  right and left portal veins, and nearly the entire splenic vein. The SMV  is patent. The spleen is enlarged and there are 3 small wedge-shaped  regions of decreased enhancement. There is a heterogeneous enhancement  pattern throughout the central aspect of the liver. There is a tiny  amount of perihepatic and perisplenic ascites and there is a small  amount of free fluid within the dependent aspect of the pelvis. No  acute  abnormality is seen involving the pancreas, adrenals, or kidneys. Single  nonobstructing left renal stone is noted. No acute bowel abnormality is  seen. Prostatic brachytherapy seeds again noted.      IMPRESSION:  1. Extensive acute thrombosis of the portal vein, right and left  intrahepatic portal veins, and there is extensive acute thrombosis of  nearly the entire splenic vein. The SMV is patent.  2. Development of splenomegaly and there are 3 small evolving splenic  infarctions. Very small volume of free fluid within the abdomen and  pelvis.      Discussed with Dr. Siddiqui.      AFP Tumor Marker   Order: [id number]   Status:  Final result   Visible to patient:  No (Not Released)      Ref Range & Units 2d ago     AFP Tumor Marker 0.0 - 8.3 ng/mL 1.2   Comments: Roche ECLIA methodology   Resulting Agency  [facility]   Narrative   Performed at:  [address] [place] [address]  [id number]  : Robel Del Valle PhD, Phone:  [phone]      Specimen Collected: 07/04/17 11:24 AM Last Resulted: 07/05/17                Cancer Antigen 19-9   Order: [id number]   Status:  Final result   Visible to patient:  No (Not Released)      Ref Range & Units 2d ago     CA 19-9 0 - 35 U/mL 16   Comments: Roche ECLIA methodology   Resulting Agency  [facility]   Narrative   Performed at:  [address] [place] [address]  [id number]  : Robel Del Valle PhD, Phone:  [phone]      Specimen Collected: 07/04/17 11:24 AM Last Resulted: 07/05/17  5:09 PM              Cardiolipin Antibody, IgA   Order: [id number]   Status:  Final result   Visible to patient:  No (Not Released)      Ref Range & Units 2d ago     Anticardiolipin IgA 0 - 11 APL U/mL <9   Comments:                           Negative:              <12                             Indeterminate:     12 - 20                             Low-Med Positive: >20 - 80                             High Positive:         >80   Resulting  Agency  [facility]   Narrative   Performed at:  [facility] - [facility] [address]  [id number]  : Robel Del Valle PhD, Phone:  [phone]      Specimen Collected: 07/04/17 11:24 AM Last Resulted: 07/05/17                Findings:upper gi endosocpy  Diffuse mild inflammation characterized by erythema was found in the entire examined stomach.  Type 2 gastroesophageal varices (GOV2, esophageal varices which extend along the fundus) with no bleeding were  found in the gastric fundus.  Grade II varices were found in the lower third of the esophagus    Hematology WBC   Date Value Ref Range Status   07/09/2017 11.06 (H) 4.50 - 10.70 10*3/mm3 Final     RBC   Date Value Ref Range Status   07/09/2017 4.84 4.60 - 6.00 10*6/mm3 Final     Hemoglobin   Date Value Ref Range Status   07/09/2017 12.6 (L) 13.7 - 17.6 g/dL Final     Hematocrit   Date Value Ref Range Status   07/09/2017 39.8 (L) 40.4 - 52.2 % Final     Platelets   Date Value Ref Range Status   07/09/2017 202 140 - 500 10*3/mm3 Final      Assessment/Plan this patient has thrombophilia associated with FACTOR V Leiden mutation.  He has had extensive thrombosis in the failure in lower extremities he has had sagittal vein thrombosis he has had a chronic anticoagulation initiated with Coumadin and subsequently with a Xarelto.  The patient has been taking this Xarelto religiously according to the medical records are documented in the emergency room.  However submitted most is the fact that the patient has had abdominal pain subsided significantly for the last 2-3 months he has lost his appetite and he has lost 20 pounds in weight.  They and the patient has had in March of this year reassessment of previous malignancy specifically lung cancer having no evidence for recurrence.  On the thrombosis of the portal vein is an entity that can be associated with several malignancies in the gastrointestinal tract including pancreatic cancer as well as liver  cancer.  The patient has developed a significant anemia related to iron deficiency and he was treated subsequently for these by  this incident the summer of 2016.  Therefore medically to 1 that he will be a seen is normally they thrombophilia associated with  Leiden mutation and also thrombophilia associated with some sort of malignancy.  Even though the radiological assessment of his CT scan shows nothing to suggest a hepatocellular carcinoma or a pancreatic cancer and makes it under reasonably to proceed with tumor markers including a CA 19-9 as well as CEA and also the alpha-fetoprotein.    Given the history of prostate cancer and going to go ahead and proceed per PSA.  I'm going to repeat his thrombophilia labs including lupus anticoagulant and anticardiolipin antibody profile along with a C-reactive protein.  Thrombophilia CAN HAPPEN IN patient WITH Monoclonal protein.  For this reason I'm going to proceed with a serum protein ELECTROPHORESIS.  From the point of your treatment the patient is going to require continuation of the brain and eventually initiation of transition with Coumadin.  Coumadin will require to be follow-up in the office in the future down going to go ahead and make appointments to follow-up with Dr. JHAVERI the close future for pro times and so forth.    Eventually we'll continue related to repeat the CT scan of the abdomen to see if there resolution of the portal vein thrombosis and splenic vein thromboses and to see what happens with his enlarged spleen that is a congestive form of splenomegaly.  Finally the patient is stated that has not been lOng since he had a  colonoscopy.  Again the fact that he developed IRON deficiency anemia on blood thinner medication makes it to WONDER FOR source of  malignancy the gastrointestinal tract.    Discussed with dr LEMOS: looking for another reason of thrombophilia CEA and PSA normal, AFP and ca 19-9 pending, other labs pending; we agree into  endoscopies at this point he has hemo positive stools, and on anticoagulants we need to know the source of bleeding.He will consult GI. Discussed with pt wife and daughter in detail.  On 7/1  7 the patient have to say it is felt today well he has minimal if any abdominal pain and he is ready to proceed today with his upper GI endoscopy.  His daughter has made the statement that the patient behavior has changed during the last say in several weeks to months his more aggressive his forgetful.  He repetitions and she is worried that he has had NEW strokes.  And given the fact that he will have an endoscopy today I would proceed with an MRI scan of the brain tomorrow for to review the anatomy and see if indeed he has hadVascular events  Tomorrow we will may the transition from IV hip.  Subcutaneous Lovenox and subsequently the patient will be placed on Coumadin and he wishes will be glad to follow-up his pro time in the office in the close future.  This could be arranged by tomorrow.  We will review again his MRI tomorrow.  Review the laboratory parameters as stated above including a normal 50 to alpha-fetoprotein, normal CA 19-9, negative anticardiolipin antibody profile, therefore so far we have not found any other reason for thrombophilia the is a able to explain why he is having all the other problem is that he passed.  Obviously the upper GI endoscopy will be able to tell last something else say if anything else is happening in the gastrointestinal tract.  The patient had a colonoscopy not too long ago and there is no going to be another one done at this time.I saw his endoscopy report , high risk for anticoagulation and bleeding , he will need weekly cbc pro time and MD visit in 2 or 3 weeks, i will stop heparin today and transfer him to lovenox and coumadin, MRi brain done report pending in regard question of new strokes, discussed with nurse , pt and daughter    Plan: 1. hold Lovenox with INR of 3.9.  We will  have nurse okay with primary care team.    2.  Hold Coumadin until INR between 2-3.    3.  If patient is discharged then he has a follow-up appointment this but missed a for PT/INR check in our office with plans of doing it once a week in our office with nurse review and subsequently he follow-up with the Dr. magallanes  in 4 weeks.    4.  Patient likely will go to rehabilitation.  We will check PT/INR CBC tomorrow.    Treasure Capone MD

## 2017-07-09 NOTE — SIGNIFICANT NOTE
07/09/17 1607   Rehab Treatment   Discipline physical therapist   Treatment Not Performed patient/family decline treatment, pt not feeling well  (pt states he did not sleep well and is not feeling good today.  States he has been up to the chair,and will get up again for dinner.  Does not want to have PT today.  Will check on him again tomorrow.)   Recommendation   PT - Next Appointment 07/10/17

## 2017-07-09 NOTE — PLAN OF CARE
Problem: Patient Care Overview (Adult)  Goal: Plan of Care Review  Outcome: Ongoing (interventions implemented as appropriate)    07/09/17 0319   Coping/Psychosocial Response Interventions   Plan Of Care Reviewed With patient   Patient Care Overview   Progress improving   Outcome Evaluation   Outcome Summary/Follow up Plan Pt has not c/o pain or discomfort this night. He is sleeping intermittently. Lovenox therapy continues. no new nursing issues to report.         Problem: Skin Integrity Impairment, Risk/Actual (Adult)  Goal: Skin Integrity/Wound Healing  Outcome: Ongoing (interventions implemented as appropriate)    Problem: Fall Risk (Adult)  Goal: Identify Related Risk Factors and Signs and Symptoms  Outcome: Outcome(s) achieved Date Met:  07/09/17  Goal: Absence of Falls  Outcome: Ongoing (interventions implemented as appropriate)    Problem: Diabetes, Type 2 (Adult)  Goal: Signs and Symptoms of Listed Potential Problems Will be Absent or Manageable (Diabetes, Type 2)  Outcome: Ongoing (interventions implemented as appropriate)    Problem: GI Endoscopy (Adult)  Goal: Signs and Symptoms of Listed Potential Problems Will be Absent or Manageable (GI Endoscopy)  Outcome: Ongoing (interventions implemented as appropriate)

## 2017-07-10 NOTE — DISCHARGE PLACEMENT REQUEST
"Deisi Cantrell (77 y.o. Male)     Date of Birth Social Security Number Address Home Phone MRN    1940  [id number] [phone] [id number]    Christian Marital Status          Jain        Admission Date Admission Type Admitting Provider Attending Provider Department, Room/Bed    7/3/17 Emergency Gigi Gillespie Jr., MD [place], Felix Albert Jr., MD [address], 616/1    Discharge Date Discharge Disposition Discharge Destination                      Attending Provider: Gigi Gillespie Jr., MD     Allergies:  No Known Allergies    Isolation:  None   Infection:  None   Code Status:  Conditional    Ht:  69\" (175.3 cm)   Wt:  158 lb 12.8 oz (72 kg)    Admission Cmt:  None   Principal Problem:  None                Active Insurance as of 7/3/2017     Primary Coverage     Payor Plan Insurance Group Employer/Plan Group    MEDICARE MEDICARE A & B      Payor Plan Address Payor Plan Phone Number Effective From Effective To    [address] [phone] 5/1/2005     [place] [address]       Subscriber Name Subscriber Birth Date Member ID       DEISI CANTRELL 1940 [id number]           Secondary Coverage     Payor Plan Insurance Group Employer/Plan Group    [facility] SUPP [id number]     Payor Plan Address Payor Plan Phone Number Effective From Effective To    [address]  12/1/2016     [place] [address]       Subscriber Name Subscriber Birth Date Member ID       DEISI CANTRELL 1940 [id number]                 Emergency Contacts      (Rel.) Home Phone Work Phone Mobile Phone    CarlosAnya (Spouse) [phone] -- --    CookseyBouchra (Daughter) -- -- [phone]              "

## 2017-07-10 NOTE — CONSULTS
"Adult Nutrition  Assessment/PES    Patient Name:  Alfredo Gonzalez  YOB: 1940  MRN: [id number]  Admit Date:  7/3/2017    Assessment Date:  7/10/2017     Comments:  Nutrition assessment complete. Pt with reported 20lb weight loss in 2-3 months. Appetite \"fair\". Liked the Ensure he tried. Concerned may spike Blood sugar - will switch to Glucerna with meals and follow. Encouraged po intake.  Pt eager to go home.        Reason for Assessment       07/10/17 1425    Reason for Assessment    Reason For Assessment/Visit physician consult    Identified At Risk By Screening Criteria unintentional loss of 10 lbs or more in the past 2 mos    Diagnosis Diagnosis    Cardiac Other (comment);HTN   Mesenteric venous thrombosis    Endocrine DM Type 2    Hematological Anemia    Oncology Lung cancer    Renal CKD              Nutrition/Diet History       07/10/17 1430    Nutrition/Diet History    Supplemental Drinks/Foods/Additives tried a vanilla ensure and liked it      07/10/17 1426    Nutrition/Diet History    Factors Affecting Nutritional Intake Factors    Reported/Observed By MD    Appetite Poor            Anthropometrics       07/10/17 1426    Usual Body Weight (UBW)    Usual Body Weight 80.7 kg (178 lb)    % Usual Body Weight Malnutrition 80-90% - mild deficit    Weight Loss 9.072 kg (20 lb)    % Weight Loss  11 %    Weight Loss Time Frame 3 months    Body Mass Index (BMI)    BMI Grade 30 - 34.9- obesity - grade I            Labs/Tests/Procedures/Meds       07/10/17 1427    Labs/Tests/Procedures/Meds    Diagnostic Test/Procedure Review reviewed, pertinent    Labs/Tests Review Glucose;Reviewed;BUN;Creat;Alb    Medication Review Reviewed, pertinent;Anticonvulsant;Insulin;Antacid    Significant Vitals reviewed            Physical Findings       07/10/17 1428    Physical Findings/Assessment    Additional Documentation Physical Appearance (Group)    Physical Appearance    Skin --   intact              Nutrition " Prescription Ordered       07/10/17 1428    Nutrition Prescription PO    Common Modifiers Consistent Carbohydrate            Evaluation of Received Nutrient/Fluid Intake       07/10/17 1428    PO Evaluation    Number of Meals 2    % PO Intake 50-75      Problem/Interventions:        Problem 1       07/10/17 1431    Nutrition Diagnoses Problem 1    Problem 1 Inadequate Intake/Infusion    Inadequate Intake Type Oral    Macronutrient Kcal;Protein    Signs/Symptoms (evidenced by) Unintended Weight Change    Unintended Weight Change Loss    Number of Pounds Lost 20lb    Weight loss time period 3 months              Intervention Goal       07/10/17 1431    Intervention Goal    General Maintain nutrition;Reduce/improve symptoms;Meet nutritional needs for age/condition;Disease management/therapy    PO Tolerate PO;PO intake (%)    PO Intake % 80 %    Weight Maintain weight            Nutrition Intervention       07/10/17 1431    Nutrition Intervention    RD/Tech Action Care plan reviewd;Follow Tx progress;Encourage intake;Supplement provided;Interview for preference;Advise available snack;Advise alternate selection            Nutrition Prescription       07/10/17 1432    Nutrition Prescription PO    PO Prescription Begin/change supplement    Supplement Glucerna Shake    Supplement Frequency 3 times a day    New PO Prescription Ordered? Yes            Education/Evaluation       07/10/17 1432    Education    Education Will Instruct as appropriate    Monitor/Evaluation    Monitor Per protocol;I&O;PO intake;Supplement intake;Pertinent labs;Weight;Skin status            Electronically signed by:  Yoana Jaquez RD  07/10/17 2:33 PM

## 2017-07-10 NOTE — PROGRESS NOTES
Continued Stay Note  [facility]     Patient Name: Alfredo Gonzalez  MRN: [id number]  Today's Date: 7/10/2017    Admit Date: 7/3/2017          Discharge Plan       07/10/17 1439    Case Management/Social Work Plan    Plan home with Located [facility] vs SNU ( [facility] and [facility] to eval.)    Patient/Family In Agreement With Plan yes    Additional Comments Approached by daughter.  1st choice for rehab is [facility] and 2nd choice is [facility].  Referrals called and both facilities will evaluate.      07/10/17 1348    Case Management/Social Work Plan    Plan home with Located [facility] vs SNU ( need facility choices)    Additional Comments Spoke with patient and daughter in room about rehab.  Explained that although patient is weak, I'm not sure he will qualify for SNU based upon PT notes.  Daughter and patient to review RTR and NH/HH list and let CCP know of 2 or 3 choices.  [facility] following in case patient unable to get into rehab.  CCP will follow               Discharge Codes     None            Yara Cervantes RN

## 2017-07-10 NOTE — PLAN OF CARE
Problem: Patient Care Overview (Adult)  Goal: Plan of Care Review  Outcome: Ongoing (interventions implemented as appropriate)    07/10/17 0316   Coping/Psychosocial Response Interventions   Plan Of Care Reviewed With patient   Patient Care Overview   Progress improving   Outcome Evaluation   Outcome Summary/Follow up Plan Resting well. no complaints this shift. VSS. Will continue to monitor.       Goal: Adult Individualization and Mutuality  Outcome: Ongoing (interventions implemented as appropriate)  Goal: Discharge Needs Assessment  Outcome: Ongoing (interventions implemented as appropriate)    Problem: Skin Integrity Impairment, Risk/Actual (Adult)  Goal: Skin Integrity/Wound Healing  Outcome: Ongoing (interventions implemented as appropriate)    Problem: Fall Risk (Adult)  Goal: Absence of Falls  Outcome: Ongoing (interventions implemented as appropriate)    Problem: Diabetes, Type 2 (Adult)  Goal: Signs and Symptoms of Listed Potential Problems Will be Absent or Manageable (Diabetes, Type 2)  Outcome: Ongoing (interventions implemented as appropriate)    Problem: GI Endoscopy (Adult)  Goal: Signs and Symptoms of Listed Potential Problems Will be Absent or Manageable (GI Endoscopy)  Outcome: Ongoing (interventions implemented as appropriate)

## 2017-07-10 NOTE — PLAN OF CARE
Problem: Patient Care Overview (Adult)  Goal: Plan of Care Review  Outcome: Ongoing (interventions implemented as appropriate)    07/10/17 2076   Coping/Psychosocial Response Interventions   Plan Of Care Reviewed With patient   Outcome Evaluation   Outcome Summary/Follow up Plan Pt with con't progression as expected at this time but requires frequent cueing for improved upright posture and sequencing w/ RWX. No new concerns noted.

## 2017-07-10 NOTE — PROGRESS NOTES
Reason for hospitalization:   Acute abdominal pain with diffuse portal vein thrombosis, failed Xarelto anticoagulation.  History of factor V Leiden mutation and extensive history of thrombosis.     Interval History: Patient's INR was 3.93 on 7/9/2017. He received only 2 doses of Coumadin so far. Day before yesterday he received 2.5 mg by mouth ×1 dose. Yesterday he received, Coumadin 1 mg. His INR went up significantly from 2.72-3.93. He likely is not eating very well. We will need to hold Lovenox. And Coumadin.  Patient has decreased intake by mouth and likely causing worsening INR. Patient is very weak and will go to rehabilitation.      On 7/10/2017, patient reports no abnormal pain no nausea or vomiting.  Appetite is slightly better, ate 50% of his breakfast today.  No constipation, he had a bowel movement yesterday on 7/9/2017.  Denies bleeding.  Coumadin was on hold so was Lovenox.      REASONS FOR FOLLOWUP:   1. Previous assessment per superior sagittal sinus thrombosis, left frontal intracerebral hemorrhage, acute calf vein thrombosis, hypercoagulable assessment positive for heterozygosity for factor V 5 Leiden gene mutation.   2. Ongoing anticoagulation with Coumadin followed by Dr. Gillespie.   3. Admission on 08/04/2014 with severe right knee pain, spontaneous infusion, calf vein thrombosis despite therapeutic anticoagulation.   4. Repeat consultation 08/05/2014 leading to chest CT revealing irregula r nodule in the right apex indeterminate ? malignancy.   5. Thoracic surgery consultation, Neurovascular surgery consultation obtained, IVC filter placed, exploratory right video assisted fluoroscopy with wedge resection right upper lobe lymphadenectomy 08/15/ 2014, pathology consistent with primary lung adenocarcinoma, stage [id number] stage IA, EGFR mutation, positive for Exon 19 mutation, (potentially responsive to EGFR tyrosine kinase therapy).   6. The patient was seen in the office 09/23/2014, adjuvant  therapy not felt necessary, IVC filter to be removed, continue Xarelto therapy thereafter recommended  7. Patient reviewed December 21, 2016 with ongoing anemia-microcytic, hypochromic  8. Determination of considerable iron deficiency and B12 deficiency? Pernicious anemia, replacement therapy is initiated  9. Patient reviewed March 8, considerable improvement per anemia though iron deficiency persists, patient with progressive foot pain, foot lesions developing, follow-up scan obtained  10. Patient reviewed April 3, CT scans negative, anemia resolved, potential lower extremity vasculopathy-dermatologic assessment pursued  11. Patient reviewed May 31, 2017, no significant improvement per lower extremity vasculopathy     HISTORY OF PRESENT ILLNESS: The patient is a 77 y.o. year old male. This patient was admitted to the hospital intensive care unit on 7/3/17 with at least 3 days history of persistent abdominal pain and 6-7/10 in the periumbilical area and epigastric area continues associated with mild nausea. He has no abdominal distention no fevers no chills no diaphoresis and no retrosternal chest pain. The time of admission through the emergency room he was found to have significant abnormalities in liver function tests further assessment documented to a CT scan of the abdomen to the patient had extensive portal vein thrombosis. He was also noticed that the patient splenomegaly. Thrombosis of the splenic vein. Obviously the patient was admitted to the intensive care unit he was placed on heparin and actually his abdominal pain has substantially subsided since then to the point of her solution and this is starting to have regular diet. The patient states that the abdominal pain is no new has been ongoing on for at least 2to3 months Mild and intensity progressive to the point that he got this time. Also he has lost his appetite completely and he has lost 20 pounds of weight in the last 2-3 months. Again he has not  had any fever or chills he doesn't have any cough or sputum production he has not had any shortness of breath. He denies any changes in his bowel habits. He denies any hematemesis or melena or interim range up. He denies any hematuria. He hasn't had any frequency to urinate and he denies any recent urinary tract infections. Ibuprofen skin lesions that were documented before were treated by his primary internal medicine physician with resolution. No new lesions evolving.     History of Past Illness   Mr. Gonzalez is a 77-year-old male who we had initially seen in consultation 02/04 through 02/14/2002 having presented at that point with a superior sagittal sinus thrombosis and left frontal intracerebral hemorrhage. Evaluation thereafter included a hypercoagulable assessment when he was found also to have an acute calf vein thrombosis and spontaneous calf vein thrombosis on the left . The patient fortunately responded well to rehabilitation and anticoagulation. He was eventually placed on Coumadin long term and hypercoagulable assessment had revealed findings consistent with heterozygosity for factor V Leiden gene mutation. The patie nt did not have followup with us, though that is not exactly certain why.       Mr. Gonzalez was later admitted 08/04/2014 through Dr. Gillespie. The patient had gone to [employer] and while standing in [employer] he noticed his right leg starting to cramp. He began to have noticeable limping thereafter and then when knee pain became so severe his wife called 911 and he was brought to [facility] emergency room. In the ER he was found to have calf vein thrombosis involving the right leg as well as a swollen k n ee with effusion. He was admitted to be seen by Orthopedics and was found to incidentally have a prothrombin time 3.1 and thus we were asked to see him for his thrombosis despite adequate anticoagulation. At that point, he was seen by this physician and a s a result of there being  concern about additional factor such as a malignancy, a CT chest, abdomen and pelvis was obtained 08/05/2014. This revealed unexpectedly an irregular 2.0 x 1.1 cm lesion in the right apical region. This was concerning for a malig n jorge. He also had non-obstructing left renal calculus and extensive lumbar spinal degenerative disease. The patient was seen by Orthopedics again with a knee tap. (Followup is still remaining concerning this). The patient was also seen by Thoracic surger ar . Please note that Dopplers 08/06/2014 revealed a subacute venous thrombosis in the right popliteal vein as well as subacute thrombus in the right calf vein. The patient was seen by Dr. Meyer per Thoracic surgery and a PET scan was obtained showing the no d ule in right lung apex to demonstrate low level activity somewhat indeterminate with an SUV of 2.0. No other abnormalities were present. It was felt that the patient should proceed to surgical resection. An IVC filter was placed prior to surgery 08/13/201 4 by Dr. Pastor Trinh. PFTs revealed a moderate airway restriction and after discussion on 08/15/2014 the patient underwent exploratory bronchoscopy, exploratory right video assisted thoracoscopy with wedge resection, right upper lobe and lymphadenectom y . The patient remains hospitalized at this point to have the surgery rather than return for subsequent surgery. Pathology per surgical specimen revealed a primary lung adenocarcinoma, demonstrating a lepidic growth pattern. R4, station 7 and R10 nodes wer e all negative for disease. The tumor size was 2.1 x 1.3 x 1.2 unifocal adenocarcinoma. His pathologic staging was a pT1b pN0M0- stage IA. Now available also at the time of this dictation is the finding that the tumor was EGFR mutation positive with a le s ion positive for E746 [id number] mutation, Exon 19. These are reported to correlate with responsiveness to EGFR tyrosine kinase inhibitor therapies. The patient was seen by   Betsy postoperative and is doing well and is also being seen by Dr. Trinh with consideration of removal of his IVC filter.       The patient now presents back to our practice for review of all of this. We discussed his time line up to this point. In a long discussion it is felt that adjuvant chemotherapy is not warranted in Mr. Gonzalez's care. Reasonably followup is however likely with at least a chest x-ray on a yearly basis possibly through Dr. Gillespie.       The patient's had follow-up through Dr. CHRISTIAN Walker since his last visit here as well as reviews to Dr. Meyer. This includes evidently a trial of oral iron when the patient was found to be anemic previously as well as recognition of the patient's kidney function-CKD 3. She also was seen in emergency room in June of this year after falling from a ladder with laboratory studies revealing anemia with hemoglobin 8.5, hematocrit 30.5 and MCV that  63.3 with MCH of 17.6 and an MCHC of 27.9, platelet count of 333,000 with normal differential. In reviewing the patient's blood counts he is definitely developed microcytosis and hypochromia since June and, as such, when immediately as concerned about iron deficiency as an underlying issue. He also has chronic leukocytosis that appears to be unchanged from previous. We discussed IV iron as a treatment to address the immediate issue and thereafter try to determine the source of blood loss if indeed iron deficiency is documented.  The patient went on to be treated with both IV Feraheme ×2 on December 23 and December 30 as well as be given injections of B12 both visits. As he seen back today January 30 he is feeling some better and his wife has also noticed a difference. We plan to continue the B12 loading over the next several weeks as well as assessing him for pernicious anemia laboratories today.  The patient is now seen back March 08, 2017. While he's had some improvement in his energy level he still has pain in his feet  which has progressed associated with a mildly raised, violaceous group of lesions involving his calf and left foot have developed. He indicates he is to see a podiatrist in the near future and with the findings of pernicious anemia, ongoing iron deficiency, and unexplained rash developing in the setting of a history of non-small cell lung cancer I requested a PET/CT-? Paraneoplasia or even new malignant process.  The patient initially was to undergo a PET/CT but this was declined. A CT of chest and pelvis was obtained with no evidence of metastatic disease identified. The patient hematologically went on to further improve but has, unfortunately, considerable pain in his lower extremities associated with a rash that has a partially vasculitic appearance. The areas in question are erythematous, serpiginous, mildly raised, and very tender to palpation. He's been seen by podiatry and treated topically without effect.  The patient was thereafter seen evidently by both dermatology and vascular surgery. Unfortunately he still has significant pain though this is managed in part by current use of pain medications with modest use thus far. He is encouraged to use this more frequently and plans are to contact Dr. CHRISTIAN Walker as well.         Medical History         Past Medical History:   Diagnosis Date   • Arthralgia     • Arthritis     • Back pain     • Chronic mixed headache syndrome     • Deep venous thrombosis of right popliteal vein       of right knee hemarthrosis   • Diabetes mellitus     • Diverticulosis     • DJD (degenerative joint disease)     • Factor V Leiden     • GERD (gastroesophageal reflux disease)     • Hypercholesterolemia     • Hypertension     • Intracerebral hemorrhage     • Lumbar spinal stenosis     • Muscular aches     • Neck pain     • Non-small cell carcinoma of lung       Stage IA, EGFR mutated   • Obesity     • Peripheral neuropathy     • Prostate cancer       Status post seed implant for radiation  "therapy   • Shortness of breath     • Stroke       H/O CVA in 2012.   • Thrombosis       Lower extremity            ONCOLOGIC HISTORY: (History from previous dates can be found in the separate document.)     No current facility-administered medications on file prior to encounter.              Current Outpatient Prescriptions on File Prior to Encounter   Medication Sig Dispense Refill   • Calcium Carbonate-Vitamin D (CALCIUM-D) 600-400 MG-UNIT tablet Take 1 tablet by mouth daily.       • carvedilol (COREG) 12.5 MG tablet         • cilostazol (PLETAL) 50 MG tablet         • clotrimazole-betamethasone (LOTRISONE) 1-0.05 % cream         • HYDROcodone-acetaminophen (NORCO) 5-325 MG per tablet Take 1 tablet by mouth every 6 (six) hours as needed.       • Insulin Syringe-Needle U-100 (RELION INSULIN SYR 0.5ML/31G) 31G X 5/16\" 0.5 ML misc         • irbesartan-hydrochlorothiazide (AVALIDE) 300-12.5 MG tablet Take 1 tablet by mouth daily.       • pravastatin (PRAVACHOL) 80 MG tablet Take by mouth daily.       • rivaroxaban (XARELTO) tablet Take 20 mg by mouth daily with dinner.       • vitamin B-6 (PYRIDOXINE) 100 MG tablet Take 100 mg by mouth.             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      Social History Main Topics   • Smoking status: Former Smoker       Packs/day: 1.00       Years: 20.00       Quit date: 2/8/1996   • Smokeless tobacco: Not on file   • Alcohol use No   • Drug use: No   • Sexual activity: No           Other Topics Concern   • Not on file      Social History Narrative            Cancer-related family history includes Colon cancer in his mother; Lung cancer (age of " onset: 77) in his brother.      Review of Systems   General: no fever, chills,significant fatigue,20 lb loss weight changes, and lack of appetite.  Eyes: no epiphora, xerophthalmia,conjunctivitis, pain, glaucoma, blurred vision, blindness, secretion, photophobia, proptosis, diplopia.  Ears: no otorrhea, tinnitus, otorrhagia, deafness, pain, vertigo.  Nose: no rhinorrhea, epistaxis, alteration in perception of odors, sinuses pressure.  Mouth: no alteration in gums or teeth, ulcers, no difficulty with mastication or deglut ion, no odynophagia.  Neck: no masses or pain, no thyroid alterations, no pain in muscles or arteries, no carotid odynia, no crepitation.  Respiratory: no cough, sputum production, dyspnea, trepopnea, pleuritic pain, hemoptysis.  Heart: no syncope, irregularity, palpitations, angina, orthopnea, paroxysmal nocturnal dyspnea.  Vascular Venous: no tenderness,edema, palpable cords, postphlebitic syndrome, skin changes or ulcerations.  Vascular Arterial: no distal ischemia, claudication, gangrene, neuropathic ischemic pain, skin ulcers, paleness or cyanosis.  GI: no dysphagia, odynophagia, no regurgitation, no heartburn,no indigestion,no nausea,no vomiting,no hematemesis ,no melena,no jaundice,no distention, no obstipation,no enterorrhagia,no proctalgia,no anal lesions, nochanges in bowel habits. Abdominal pain as stated  : no frequency, hesitancy, hematuria, discharge, pain.  Musculoskeletal: no muscle or tendon pain or inflammation, joint pain, edema, functional limitation, fasciculations, mass.  Neurologic: no headache, seizures, alterations on Craneal nerves, no motor or senssory deficit, normal coordination, no alteration in memory, orientation, calculation,writting, verbal or written language.  Skin: no rashes, pruritus or localized lesions.  Psychiatric: no anxiety, depression, agitation, delusions, proper insight.          A comprehensive 14 point review of systems was performed and was  negative except as mentioned.        Objective       Vitals:    07/09/17 2021 07/10/17 0030 07/10/17 0700 07/10/17 0900   BP: 105/55 111/55 113/80 126/69   BP Location: Left arm Left arm Left arm Left arm   Patient Position: Lying Lying Lying Lying   Pulse: 66 68 58    Resp: 16 18 18    Temp: 98.1 °F (36.7 °C) 98 °F (36.7 °C) 98.1 °F (36.7 °C)    TempSrc: Oral Oral Oral    SpO2: 97% 96% 94%    Weight:       Height:          Physical Exam   GENERAL: Well-developed, well-nourished male in no acute distress.   SKIN: Warm, dry,healed lesions in le  HEAD: Normocephalic.   EYES: Pupils equal, round and reactive to light. EOMs intact. Conjunctivae normal.   EARS: Hearing intact.   NOSE: Septum midline. No excoriations or nasal discharge.   MOUTH: Tongue is well papillated; no stomatitis or ulcers. Lips normal.   THROAT: Oropharynx without lesions or exudates.   NECK: Supple with good range of motion; no thyromegaly or masses, no JVD or bruits.   LYMPHATICS: No cervical, supraclavicular, axillary or inguinal adenopathy.   CHEST: Decreased breath sounds bilateral bases.   CARDIAC: Status post right video assisted thoracoscopy with healed incision sites.   ABDOMEN: Soft, nontender with no organomegaly or masses. No abdominal pain, palpable spleen 3 cm blcm  EXTREMITIES: Without palpable cords or positive Lucius's signs No clubbing, cyanosis or edema.   NEURO: No focal neurological deficits.          RECENT LABS:      Results from last 7 days  Lab Units 07/10/17  0623 07/09/17  1448 07/09/17  0618 07/08/17  0652   INR  3.64* 4.49* 3.93* 2.72*   APTT seconds 75.0*  --  77.9* 69.5*       Results from last 7 days  Lab Units 07/10/17  0623 07/09/17  0618 07/08/17  0652   WBC 10*3/mm3 13.51* 11.06* 13.39*   HEMOGLOBIN g/dL 13.0* 12.6* 12.5*   HEMATOCRIT % 40.5 39.8* 39.7*   PLATELETS 10*3/mm3 211 202 193       Results from last 7 days  Lab Units 07/10/17  0623 07/09/17  0618 07/06/17  0818   SODIUM mmol/L 139 139 139   POTASSIUM  mmol/L 4.1 4.3 4.1   CHLORIDE mmol/L 103 104 103   CO2 mmol/L 23.5 21.3* 22.6   BUN mg/dL 34* 30* 17   CREATININE mg/dL 1.83* 1.99* 1.47*   CALCIUM mg/dL 8.5* 8.1* 8.3*   BILIRUBIN mg/dL 0.5 0.5 0.7   ALK PHOS U/L 153* 142* 171*   ALT (SGPT) U/L 18 20 34   AST (SGOT) U/L 26 36 46*   GLUCOSE mg/dL 189* 167* 85              CT ABDOMEN AND PELVIS WITH IV CONTRAST      HISTORY: 77-year-old male with abnormal appearance of the portal vein on  a noncontrasted CT of the abdomen and pelvis performed earlier today.      TECHNIQUE: 3 mm images were obtained through the abdomen and pelvis  after the administration of IV contrast. Compared with the noncontrasted  CT abdomen and pelvis performed earlier today.      FINDINGS: There is extensive acute thrombosis of the main portal vein,  right and left portal veins, and nearly the entire splenic vein. The SMV  is patent. The spleen is enlarged and there are 3 small wedge-shaped  regions of decreased enhancement. There is a heterogeneous enhancement  pattern throughout the central aspect of the liver. There is a tiny  amount of perihepatic and perisplenic ascites and there is a small  amount of free fluid within the dependent aspect of the pelvis. No acute  abnormality is seen involving the pancreas, adrenals, or kidneys. Single  nonobstructing left renal stone is noted. No acute bowel abnormality is  seen. Prostatic brachytherapy seeds again noted.      IMPRESSION:  1. Extensive acute thrombosis of the portal vein, right and left  intrahepatic portal veins, and there is extensive acute thrombosis of  nearly the entire splenic vein. The SMV is patent.  2. Development of splenomegaly and there are 3 small evolving splenic  infarctions. Very small volume of free fluid within the abdomen and  pelvis.       Findings:upper gi endosocpy  Diffuse mild inflammation characterized by erythema was found in the entire examined stomach.  Type 2 gastroesophageal varices (GOV2, esophageal varices  which extend along the fundus) with no bleeding were  found in the gastric fundus.  Grade II varices were found in the lower third of the esophagus     Assessment/Plan   1. this patient has thrombophilia associated with FACTOR V Leiden mutation. He has had extensive thrombosis in the failure in lower extremities he has had sagittal vein thrombosis he has had a chronic anticoagulation initiated with Coumadin and subsequently with Xarelto. The patient has been taking this Xarelto religiously according to the medical records are documented in the emergency room. However submitted most is the fact that the patient has had abdominal pain  significantly for the last 2-3 months he has lost his appetite and he has lost 20 pounds in weight. They and the patient has had in March of this year reassessment of previous malignancy specifically lung cancer having no evidence for recurrence. On the thrombosis of the portal vein is an entity that can be associated with several malignancies in the gastrointestinal tract including pancreatic cancer as well as liver cancer.     2. significant anemia related to iron deficiency and he was treated subsequently for these by  in the summer of 2016.  Laboratory study on 7/4/2017 reported a reasonable ferritin 220, although low iron saturation 6% with iron 13 and a TIBC 207.  He had a super vitamin B12 level at 2000 pg/mL.  Keep in mind this patient has significant elevated CRP.  Workup for malignancy has been negative for or tumor markers including CA-19-9, AFP and PSA.      3.  Poor oral intake with significant weight loss.. I will consult the nutrition service for this patient.  I think it with improved oral intake, his treatment with Coumadin will also be easier.     Plan:   1. Continue to hold Lovenox with INR of 3.64.      2. Hold Coumadin until INR between 2-3.    3.  Consult the nutrition service.      4. If patient is discharged then he has a follow-up appointment for PT/INR  check in our office with plans of doing it once a week in our office with nurse review and subsequently he follow-up with the Dr. Coley in 4 weeks.     5.  Continue physical therapy.      6. Patient likely will go to rehabilitation. We will check PT/INR CBC tomorrow.       Discussed that with the patient today.       YUSRA CABALLERO M.D., Ph.D.    7/10/17

## 2017-07-10 NOTE — PROGRESS NOTES
History:   Patient resting in bed this morning.  He is very weak.  Eyes any significant pain.  He denies shortness of breath.  He is not very hungry.  He is oriented to person, place, time and situation.    Allergies  Review of patient's allergies indicates no known allergies.      Current Facility-Administered Medications:   •  dextrose (D50W) solution 25 g, 25 g, Intravenous, Q15 Min PRN, Landry Ho MD, 25 g at 07/08/17 0929  •  dextrose (D50W) solution 25 g, 25 g, Intravenous, Q15 Min PRN, Ceferino Gillespie MD, 25 g at 07/08/17 0751  •  dextrose (GLUTOSE) oral gel 15 g, 15 g, Oral, Q15 Min PRN, Landry Ho MD  •  dextrose (GLUTOSE) oral gel 15 g, 15 g, Oral, Q15 Min PRN, Ceferino Gillespie MD  •  donepezil (ARICEPT) tablet 5 mg, 5 mg, Oral, Nightly, Ceferino Gillespie MD, 5 mg at 07/09/17 2036  •  glucagon (human recombinant) (GLUCAGEN DIAGNOSTIC) injection 1 mg, 1 mg, Subcutaneous, Q15 Min PRN, Landry Ho MD  •  glucagon (human recombinant) (GLUCAGEN DIAGNOSTIC) injection 1 mg, 1 mg, Subcutaneous, Q15 Min PRN, Ceferino Gillespie MD  •  HYDROcodone-acetaminophen (NORCO) 5-325 MG per tablet 1 tablet, 1 tablet, Oral, Q4H PRN, Ceferino Gillespie MD, 1 tablet at 07/09/17 1734  •  insulin aspart (novoLOG) injection 0-9 Units, 0-9 Units, Subcutaneous, 4x Daily With Meals & Nightly, Ceferino Gillespie MD, 4 Units at 07/09/17 1729  •  insulin detemir (LEVEMIR) injection 30 Units, 30 Units, Subcutaneous, Nightly, Ceferino Gillespie MD, 30 Units at 07/09/17 2230  •  levETIRAcetam (KEPPRA) tablet 500 mg, 500 mg, Oral, BID, Ceferino Gillespie MD, 500 mg at 07/09/17 1729  •  morphine injection 1 mg, 1 mg, Intravenous, Q4H PRN **AND** naloxone (NARCAN) injection 0.4 mg, 0.4 mg, Intravenous, Q5 Min PRN, Ceferino Gillespie MD  •  nadolol (CORGARD) tablet 20 mg, 20 mg, Oral, Q24H, Tim Xie MD, 20 mg at 07/09/17 0843  •  oxybutynin (DITROPAN) tablet 5 mg, 5 mg, Oral, Daily, Ceferino Gillespie MD, 5 mg at 07/09/17 0843  •   "pantoprazole (PROTONIX) EC tablet 40 mg, 40 mg, Oral, Q AM, Ceferino Gillespie MD, 40 mg at 07/10/17 0534  •  sodium chloride 0.9 % flush 1-10 mL, 1-10 mL, Intravenous, PRN, Ceferino Gillespie MD  •  sodium chloride 0.9 % flush 10 mL, 10 mL, Intravenous, PRN, Sher Siddiqui MD  •  traZODone (DESYREL) tablet 50 mg, 50 mg, Oral, Nightly, Ceferino Gillespie MD, 50 mg at 07/09/17 2036  •  valsartan (DIOVAN) tablet 160 mg, 160 mg, Oral, Q24H, Ceferino Gillespie MD, 160 mg at 07/09/17 0844    /55 (BP Location: Left arm, Patient Position: Lying)  Pulse 58  Temp (P) 98.1 °F (36.7 °C) (Oral)   Resp (P) 18  Ht 69\" (175.3 cm)  Wt 158 lb 12.8 oz (72 kg)  SpO2 94%  BMI 33.37 kg/m2    Physical Exam     Neck: JVD physiologic.  Heart: Regular rate and rhythm.  Lungs: Fair air exchange.  No wheezes.  Abdomen: Bowel sounds present, nontender.  Extremities: No edema.  Neurologic exam: Nonfocal.    Lab Results (last 24 hours)     Procedure Component Value Units Date/Time    POC Glucose Fingerstick [[id number]]  (Abnormal) Collected:  07/09/17 1203    Specimen:  Blood Updated:  07/09/17 1205     Glucose 303 (H) mg/dL     Narrative:       Meter: [id number] : [id number] Ajay Medeiros    Protime-INR [[id number]]  (Abnormal) Collected:  07/09/17 1448    Specimen:  Blood Updated:  07/09/17 1553     Protime 41.4 (H) Seconds      INR 4.49 (C)    POC Glucose Fingerstick [[id number]]  (Abnormal) Collected:  07/09/17 1629    Specimen:  Blood Updated:  07/09/17 1630     Glucose 211 (H) mg/dL     Narrative:       Meter: [id number] : [id number] Ajay Medeiros    POC Glucose Fingerstick [[id number]]  (Abnormal) Collected:  07/09/17 2220    Specimen:  Blood Updated:  07/09/17 2221     Glucose 237 (H) mg/dL     Narrative:       Meter: [id number] : [id number] Ajay Medeiros    Scan Slide [[id number]] Collected:  07/10/17 0623    Specimen:  Blood Updated:  07/10/17 0701    aPTT [[id number]]  (Abnormal) Collected:  07/10/17 0623    Specimen:  Blood Updated: "  07/10/17 0711     PTT 75.0 (H) seconds     Protime-INR [[id number]]  (Abnormal) Collected:  07/10/17 0623    Specimen:  Blood Updated:  07/10/17 0711     Protime 35.1 (H) Seconds      INR 3.64 (H)    CBC & Differential [[id number]] Collected:  07/10/17 0623    Specimen:  Blood Updated:  07/10/17 0719    Narrative:       The following orders were created for panel order CBC & Differential.  Procedure                               Abnormality         Status                     ---------                               -----------         ------                     Scan Slide[[id number]]                                       In process                 CBC Auto Differential[[id number]]        Abnormal            Final result                 Please view results for these tests on the individual orders.    CBC Auto Differential [[id number]]  (Abnormal) Collected:  07/10/17 0623    Specimen:  Blood Updated:  07/10/17 0719     WBC 13.51 (H) 10*3/mm3      RBC 4.97 10*6/mm3      Hemoglobin 13.0 (L) g/dL      Hematocrit 40.5 %      MCV 81.5 fL      MCH 26.2 (L) pg      MCHC 32.1 (L) g/dL      RDW 16.1 (H) %      RDW-SD 48.3 fl      Platelets 211 10*3/mm3      Neutrophil % 80.7 (H) %      Lymphocyte % 7.1 (L) %      Monocyte % 8.7 %      Eosinophil % 1.8 %      Basophil % 0.7 %      Immature Grans % 1.0 (H) %      Neutrophils, Absolute 10.90 (H) 10*3/mm3      Lymphocytes, Absolute 0.96 10*3/mm3      Monocytes, Absolute 1.18 10*3/mm3      Eosinophils, Absolute 0.24 10*3/mm3      Basophils, Absolute 0.09 10*3/mm3      Immature Grans, Absolute 0.14 (H) 10*3/mm3      nRBC 0.0 /100 WBC     Comprehensive Metabolic Panel [[id number]]  (Abnormal) Collected:  07/10/17 0623    Specimen:  Blood Updated:  07/10/17 0757     Glucose 189 (H) mg/dL      BUN 34 (H) mg/dL      Creatinine 1.83 (H) mg/dL      Sodium 139 mmol/L      Potassium 4.1 mmol/L      Chloride 103 mmol/L      CO2 23.5 mmol/L      Calcium 8.5 (L) mg/dL      Total Protein 5.9 (L)  g/dL      Albumin 2.30 (L) g/dL      ALT (SGPT) 18 U/L      AST (SGOT) 26 U/L      Alkaline Phosphatase 153 (H) U/L      Total Bilirubin 0.5 mg/dL      eGFR Non African Amer 36 (L) mL/min/1.73      Globulin 3.6 gm/dL      A/G Ratio 0.6 g/dL      BUN/Creatinine Ratio 18.6     Anion Gap 12.5 mmol/L     Narrative:       The MDRD GFR formula is only valid for adults with stable renal function between ages 18 and 70.    POC Glucose Fingerstick [[id number]]  (Normal) Collected:  07/10/17 0758    Specimen:  Blood Updated:  07/10/17 0800     Glucose 126 mg/dL     Narrative:       Meter: [id number] : [id number] Michelle ZHENG          Imp:  Thrombophilia (factor V Leiden) with portal vein thrombosis; ProTime is too thin.  We will continue to hold Coumadin.    Anemia/esophageal varices/heme positive stool; stable.    Chronic renal insufficiency; Minimally improved.    Hypertension; controlled.      Mild cognitive impairment; recent MRI does not show a new CNS event.    Type 2 diabetes mellitus; fair control.    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    Disposition; we're looking into rehabilitation placement.  I can discharge later today if arrangements are made.      Plan:  As above.      Gigi Gillespie Jr., MD  7/10/2017  8:01 AM

## 2017-07-10 NOTE — THERAPY TREATMENT NOTE
Acute Care - Physical Therapy Treatment Note  [facility]     Patient Name: Alfredo Gonzalez  : 1940  MRN: [id number]  Today's Date: 7/10/2017  Onset of Illness/Injury or Date of Surgery Date: 17     Referring Physician: Verna    Admit Date: 7/3/2017    Visit Dx:    ICD-10-CM ICD-9-CM   1. Acute venous thrombosis I82.90 453.9   2. Generalized abdominal pain R10.84 789.07   3. General weakness R53.1 780.79   4. Portal vein thrombosis I81 452   5. Iron deficiency anemia, unspecified iron deficiency anemia type D50.9 280.9     Patient Active Problem List   Diagnosis   • Non-small cell lung cancer   • Chest pain   • Chronic kidney disease (CKD)   • Microcytic hypochromic anemia   • History of prostate cancer   • Anemia, B12 deficiency   • Iron deficiency anemia   • Pernicious anemia   • Portal vein thrombosis   • Portal vein thrombosis               Adult Rehabilitation Note       07/10/17 1313 17 1500 17 1513    Rehab Assessment/Intervention    Discipline physical therapy assistant  -JONAS physical therapist  -WILLIAM physical therapy assistant  -ALEJANDRO    Document Type therapy note (daily note)  -JONAS therapy note (daily note)  -WILLIAM therapy note (daily note)  -JW    Subjective Information agree to therapy  -JONAS agree to therapy  -WILLIAM agree to therapy  -JW    Patient Effort, Rehab Treatment good  -JONAS good  -WILLIAM     Precautions/Limitations fall precautions;oxygen therapy device and L/min   2L  -JONAS oxygen therapy device and L/min   2L  -WILLIAM fall precautions;oxygen therapy device and L/min  -JW    Recorded by [JONAS] Pastor Duran PTA [WILLIAM] Lissy Lopez, PT [JW] Lois Mccray PTA    Vital Signs    Pre SpO2 (%) 96  -JONAS      O2 Delivery Pre Treatment supplemental O2  -JONAS      Post SpO2 (%) 95  -JONAS      O2 Delivery Post Treatment supplemental O2  -JONAS      Pre Patient Position Supine  -JONAS      Intra Patient Position Standing  -JONAS      Post Patient Position Sitting  -JONAS      Recorded by [JONAS] Pastor Duran PTA       Pain Assessment    Pain Assessment No/denies pain  -JONAS No/denies pain  -WILLIAM No/denies pain  -JW    Recorded by [JONAS] Pastor Duran PTA [WILLIAM] Lissy Lopez, PT [JW] Lois Mccray PTA    Cognitive Assessment/Intervention    Current Cognitive/Communication Assessment functional  -JONAS functional  -WILLIAM functional  -JW    Orientation Status oriented x 4  -JONAS oriented x 4  -WILLIAM     Follows Commands/Answers Questions 100% of the time  -JONAS      Personal Safety WNL/WFL  -JONAS WNL/WFL  -WILLIAM     Personal Safety Interventions fall prevention program maintained;gait belt;nonskid shoes/slippers when out of bed  -JONAS fall prevention program maintained;gait belt;nonskid shoes/slippers when out of bed  -WILLIAM fall prevention program maintained;gait belt  -JW    Recorded by [JONAS] aPstor Duran PTA [WILLIAM] Lissy Lopez, PT [JW] Lois Mccray PTA    Bed Mobility, Assessment/Treatment    Bed Mobility, Assistive Device bed rails;head of bed elevated  -JONAS bed rails;head of bed elevated  -WILLIAM     Bed Mob, Supine to Sit, [place] supervision required;verbal cues required  -JONAS conditional independence  -WILLIAM conditional independence  -JW    Bed Mob, Sit to Supine, [place] not tested  -JONAS supervision required;verbal cues required  -WILLIAM contact guard assist  -JW    Bed Mobility, Safety Issues decreased use of arms for pushing/pulling;decreased use of legs for bridging/pushing  -JONAS      Bed Mobility, Impairments strength decreased  -JONAS      Recorded by [JONAS] Pastor Duran PTA [WILLIAM] Lissy Lopez, PT [JW] Lois Mccray PTA    Transfer Assessment/Treatment    Transfers, Sit-Stand [place] supervision required;verbal cues required  -JONAS stand by assist;verbal cues required  -WILLIAM stand by assist;contact guard assist  -JW    Transfers, Stand-Sit [place] supervision required;verbal cues required  -JONAS stand by assist;verbal cues required  -WILLIAM stand by assist;contact guard assist  -JW    Transfers, Sit-Stand-Sit, Assist Device rolling  walker  -JONAS rolling walker  -WILLIAM rolling walker  -JW    Transfer, Safety Issues balance decreased during turns  -JONAS      Transfer, Impairments strength decreased;impaired balance  -JONAS      Recorded by [JONAS] Pastor Duran PTA [WILLIAM] Lissy Lopez, PT [JW] Lois Mccray PTA    Gait Assessment/Treatment    Gait, [place] Level contact guard assist;verbal cues required;nonverbal cues required (demo/gesture)  -JONAS stand by assist;verbal cues required  -WILLIAM contact guard assist  -JW    Gait, Assistive Device rolling walker  -JONAS rolling walker  -WILLIAM rolling walker  -JW    Gait, Distance (Feet) 300  -JONAS 300  -WILLIAM 300  -JW    Gait, Gait Deviations jake decreased;forward flexed posture;limb motion velocity decreased;step length decreased;toe-to-floor clearance decreased  -JONAS  jake decreased;forward flexed posture;step length decreased  -JW    Gait, Safety Issues step length decreased;balance decreased during turns;supplemental O2  -JONAS      Gait, Impairments strength decreased;impaired balance  -JONAS      Recorded by [JONAS] Pastor Duran PTA [WILLIAM] Lissy Lopez, PT [JW] Lois Mccray PTA    Therapy Exercises    Bilateral Lower Extremities AROM:;10 reps;ankle pumps/circles;LAQ;hip flexion  -JONAS      Recorded by [JONAS] Pastor Duran PTA      Positioning and Restraints    Pre-Treatment Position in bed  -JONAS in bed  -WILLIAM in bed  -JW    Post Treatment Position chair  -JONAS bed  -WILLIAM bed  -JW    In Bed  supine;call light within reach;notified nsg;exit alarm on  -WILLIAM supine;call light within reach;with family/caregiver  -JW    In Chair sitting;call light within reach;encouraged to call for assist;exit alarm on;with family/caregiver  -JONAS      Recorded by [JONAS] Pastor Duran PTA [WILLIAM] Lissy Lopez, PT [JW] Lois Mccray PTA      User Key  (r) = Recorded By, (t) = Taken By, (c) = Cosigned By    Initials Name Effective Dates    WILLIAM Lopez, PT 10/06/15 -     ALEJANDRO Mccray PTA 02/18/16 -     JONAS Duran PTA  04/24/15 -                 IP PT Goals       07/05/17 1150          Transfer Training PT LTG    Transfer Training PT LTG, Date Established 07/05/17  -EE (r) ELICEO (t) EE (c)      Transfer Training PT LTG, Time to Achieve 1 wk  -EE (r) ELICEO (t) EE (c)      Transfer Training PT LTG, Activity Type all transfers  -EE (r) ELICEO (t) EE (c)      Transfer Training PT LTG, [place] Level supervision required  -EE (r) ELICEO (t) EE (c)      Transfer Training PT LTG, Assist Device walker, rolling  -EE (r) ELICEO (t) EE (c)      Gait Training PT LTG    Gait Training Goal PT LTG, Date Established 07/05/17  -EE (r) ELICEO (t) EE (c)      Gait Training Goal PT LTG, Time to Achieve 1 wk  -EE (r) ELICEO (t) EE (c)      Gait Training Goal PT LTG, [place] Level supervision required  -EE (r) ELICEO (t) EE (c)      Gait Training Goal PT LTG, Assist Device walker, rolling  -EE (r) ELICEO (t) EE (c)      Gait Training Goal PT LTG, Distance to Achieve 300  -EE (r) ELICEO (t) EE (c)      Stair Training PT LTG    Stair Training Goal PT LTG, Date Established 07/05/17  -EE (r) ELICEO (t) EE (c)      Stair Training Goal PT LTG, Time to Achieve 1 wk  -EE (r) ELICEO (t) EE (c)      Stair Training Goal PT LTG, Number of Steps 5  -EE (r) ELICEO (t) EE (c)      Stair Training Goal PT LTG, [place] Level contact guard assist  -EE (r) ELICEO (t) EE (c)      Stair Training Goal PT LTG, Assist Device 1 handrail  -EE (r) ELICEO (t) EE (c)        User Key  (r) = Recorded By, (t) = Taken By, (c) = Cosigned By    Initials Name Provider Type    JESENIA Mitchell, PT Physical Therapist    ELICEO Santacruz, PT Student PT Student          Physical Therapy Education     Title: PT OT SLP Therapies (Done)     Topic: Physical Therapy (Done)     Point: Mobility training (Done)    Learning Progress Summary    Learner Readiness Method Response Comment Documented by Status   Patient Acceptance NILES ROLAND 07/10/17 1336 Done    Acceptance E VU,NILES THOMAS 07/08/17 1716 Done    Acceptance E CHANTAL ALLEN 07/07/17 1518 Done     Acceptance E VU,NR   07/06/17 1151 Done    Acceptance E VU,NR   07/05/17 1149 Done   Family Acceptance E VU,NR   07/05/17 1149 Done               Point: Home exercise program (Done)    Learning Progress Summary    Learner Readiness Method Response Comment Documented by Status   Patient Acceptance E VU,NR   07/10/17 1336 Done    Acceptance E VU,NR   07/06/17 1151 Done    Acceptance E VU,NR   07/05/17 1149 Done   Family Acceptance E VU,NR   07/05/17 1149 Done               Point: Body mechanics (Done)    Learning Progress Summary    Learner Readiness Method Response Comment Documented by Status   Patient Acceptance E VU,NR  JONAS 07/10/17 1336 Done    Acceptance E VU,NR   07/05/17 1149 Done   Family Acceptance E VU,NR   07/05/17 1149 Done               Point: Precautions (Done)    Learning Progress Summary    Learner Readiness Method Response Comment Documented by Status   Patient Acceptance E VU,NR   07/10/17 1336 Done    Acceptance E VU,NR   07/05/17 1149 Done   Family Acceptance E VU,NR   07/05/17 1149 Done                      User Key     Initials Effective Dates Name Provider Type Discipline     05/18/15 -  Yamel Hodgson, PT Physical Therapist PT    WILLIAM 10/06/15 -  Lissy Lopez, PT Physical Therapist PT     02/18/16 -  Lois Mccray, PTA Physical Therapy Assistant PT     04/24/15 -  Pastor Duran, PTA Physical Therapy Assistant PT     05/08/17 -  Leighton Santacruz, PT Student PT Student PT                    PT Recommendation and Plan  Anticipated Equipment Needs At Discharge: front wheeled walker  Anticipated Discharge Disposition: home with home health  Planned Therapy Interventions: balance training, gait training, home exercise program, patient/family education, strengthening  PT Frequency: daily  Plan of Care Review  Plan Of Care Reviewed With: patient  Outcome Summary/Follow up Plan: Pt with con't progression as expected at this time but requires frequent cueing for  improved upright posture and sequencing w/ RWX.  No new concerns noted.           Outcome Measures       07/10/17 1300 07/08/17 1700 07/07/17 1500    How much help from another person do you currently need...    Turning from your back to your side while in flat bed without using bedrails? 4  -JONAS 4  -WILLIAM 4  -JW    Moving from lying on back to sitting on the side of a flat bed without bedrails? 4  -JONAS 4  -WILLIAM 4  -JW    Moving to and from a bed to a chair (including a wheelchair)? 3  -JONAS 4  -WILLIAM 3  -JW    Standing up from a chair using your arms (e.g., wheelchair, bedside chair)? 3  -JONAS 4  -WILLIAM 3  -JW    Climbing 3-5 steps with a railing? 3  -JONAS 3  -WILLIAM 2  -JW    To walk in hospital room? 3  -JONAS 4  -WILLIAM 3  -JW    AM-PAC 6 Clicks Score 20  -JONAS 23  -WILLIAM 19  -JW    Functional Assessment    Outcome Measure Options AM-PAC 6 Clicks Basic Mobility (PT)  -JONAS AM-PAC 6 Clicks Basic Mobility (PT)  -WILLIAM       User Key  (r) = Recorded By, (t) = Taken By, (c) = Cosigned By    Initials Name Provider Type    WILLIAM Lopez, PT Physical Therapist    ALEJANDRO Mccray PTA Physical Therapy Assistant    JONAS Duran PTA Physical Therapy Assistant           Time Calculation:         PT Charges       07/10/17 1335          Time Calculation    Start Time 1313  -JONAS      Stop Time 1330  -JONAS      Time Calculation (min) 17 min  -JONAS      PT Received On 07/10/17  -JONAS      PT - Next Appointment 07/11/17  -JONAS        User Key  (r) = Recorded By, (t) = Taken By, (c) = Cosigned By    Initials Name Provider Type    JONAS Duran PTA Physical Therapy Assistant          Therapy Charges for Today     Code Description Service Date Service Provider Modifiers Qty    [id number] HC PT THER PROC EA 15 MIN 7/10/2017 Pastor Duran PTA GP 1          PT G-Codes  Outcome Measure Options: AM-PAC 6 Clicks Basic Mobility (PT)    Pastor Duran PTA  7/10/2017

## 2017-07-10 NOTE — PROGRESS NOTES
Continued Stay Note  [facility]     Patient Name: Alfredo Gonzalez  MRN: [id number]  Today's Date: 7/10/2017    Admit Date: 7/3/2017          Discharge Plan       07/10/17 1348    Case Management/Social Work Plan    Plan home with [facility] vs SNU ( need facility choices)    Additional Comments Spoke with patient and daughter in room about rehab.  Explained that although patient is weak, I'm not sure he will qualify for SNU based upon PT notes.  Daughter and patient to review RTR and NH/HH list and let CCP know of 2 or 3 choices.  [facility] following in case patient unable to get into rehab.  CCP will follow               Discharge Codes     None            Yara Cervantes RN

## 2017-07-11 NOTE — PROGRESS NOTES
Reason for hospitalization:   Acute abdominal pain with diffuse portal vein thrombosis, failed Xarelto anticoagulation.  History of factor V Leiden mutation and extensive history of thrombosis.     Interval History: Patient's INR was 3.93 on 7/9/2017. He received only 2 doses of Coumadin so far. Day before yesterday he received 2.5 mg by mouth ×1 dose. Yesterday he received, Coumadin 1 mg. His INR went up significantly from 2.72-3.93. He likely is not eating very well. We will need to hold Lovenox. And Coumadin.  Patient has decreased intake by mouth and likely causing worsening INR. Patient is very weak and will go to rehabilitation.      On 7/10/2017, patient reports no abnormal pain no nausea or vomiting.  Appetite is slightly better, ate 50% of his breakfast today.  No constipation, he had a bowel movement yesterday on 7/9/2017.  Denies bleeding.  Coumadin was on hold so was Lovenox.      REASONS FOR FOLLOWUP:   1. Previous assessment per superior sagittal sinus thrombosis, left frontal intracerebral hemorrhage, acute calf vein thrombosis, hypercoagulable assessment positive for heterozygosity for factor V 5 Leiden gene mutation.   2. Ongoing anticoagulation with Coumadin followed by Dr. Gillespie.   3. Admission on 08/04/2014 with severe right knee pain, spontaneous infusion, calf vein thrombosis despite therapeutic anticoagulation.   4. Repeat consultation 08/05/2014 leading to chest CT revealing irregula r nodule in the right apex indeterminate ? malignancy.   5. Thoracic surgery consultation, Neurovascular surgery consultation obtained, IVC filter placed, exploratory right video assisted fluoroscopy with wedge resection right upper lobe lymphadenectomy 08/15/ 2014, pathology consistent with primary lung adenocarcinoma, stage [id number] stage IA, EGFR mutation, positive for Exon 19 mutation, (potentially responsive to EGFR tyrosine kinase therapy).   6. The patient was seen in the office 09/23/2014, adjuvant  therapy not felt necessary, IVC filter to be removed, continue Xarelto therapy thereafter recommended  7. Patient reviewed December 21, 2016 with ongoing anemia-microcytic, hypochromic  8. Determination of considerable iron deficiency and B12 deficiency? Pernicious anemia, replacement therapy is initiated  9. Patient reviewed March 8, considerable improvement per anemia though iron deficiency persists, patient with progressive foot pain, foot lesions developing, follow-up scan obtained  10. Patient reviewed April 3, CT scans negative, anemia resolved, potential lower extremity vasculopathy-dermatologic assessment pursued  11. Patient reviewed May 31, 2017, no significant improvement per lower extremity vasculopathy     INTERVAL HISTORY:   Patient was seen by nutrition consult yesterday and started on liquid nutrition supplementation.  Patient reports improved appetite.  He also participated in physical therapy this morning, and was sitting in the chair for quite a bit time.  Patient denies abdomen pain.  Had a bowel movement today, no melena hematochezia.  His INR further dropped and is 3.08 this morning, improved.    HISTORY OF PRESENT ILLNESS: The patient is a 77 y.o. year old male. This patient was admitted to the hospital intensive care unit on 7/3/17 with at least 3 days history of persistent abdominal pain and 6-7/10 in the periumbilical area and epigastric area continues associated with mild nausea. He has no abdominal distention no fevers no chills no diaphoresis and no retrosternal chest pain. The time of admission through the emergency room he was found to have significant abnormalities in liver function tests further assessment documented to a CT scan of the abdomen to the patient had extensive portal vein thrombosis. He was also noticed that the patient splenomegaly. Thrombosis of the splenic vein. Obviously the patient was admitted to the intensive care unit he was placed on heparin and actually his  abdominal pain has substantially subsided since then to the point of her solution and this is starting to have regular diet. The patient states that the abdominal pain is no new has been ongoing on for at least 2to3 months Mild and intensity progressive to the point that he got this time. Also he has lost his appetite completely and he has lost 20 pounds of weight in the last 2-3 months. Again he has not had any fever or chills he doesn't have any cough or sputum production he has not had any shortness of breath. He denies any changes in his bowel habits. He denies any hematemesis or melena or interim range up. He denies any hematuria. He hasn't had any frequency to urinate and he denies any recent urinary tract infections. Ibuprofen skin lesions that were documented before were treated by his primary internal medicine physician with resolution. No new lesions evolving.     History of Past Illness   Mr. Gonzalez is a 77-year-old male who we had initially seen in consultation 02/04 through 02/14/2002 having presented at that point with a superior sagittal sinus thrombosis and left frontal intracerebral hemorrhage. Evaluation thereafter included a hypercoagulable assessment when he was found also to have an acute calf vein thrombosis and spontaneous calf vein thrombosis on the left . The patient fortunately responded well to rehabilitation and anticoagulation. He was eventually placed on Coumadin long term and hypercoagulable assessment had revealed findings consistent with heterozygosity for factor V Leiden gene mutation. The patie nt did not have followup with us, though that is not exactly certain why.       Mr. Gonzalez was later admitted 08/04/2014 through Dr. Gillespie. The patient had gone to [employer] and while standing in [employer] he noticed his right leg starting to cramp. He began to have noticeable limping thereafter and then when knee pain became so severe his wife called 911 and he was brought to [facility] emergency room. In the ER he was found to have calf vein thrombosis involving the right leg as well as a swollen k n ee with effusion. He was admitted to be seen by Orthopedics and was found to incidentally have a prothrombin time 3.1 and thus we were asked to see him for his thrombosis despite adequate anticoagulation. At that point, he was seen by this physician and a s a result of there being concern about additional factor such as a malignancy, a CT chest, abdomen and pelvis was obtained 08/05/2014. This revealed unexpectedly an irregular 2.0 x 1.1 cm lesion in the right apical region. This was concerning for a malig n jorge. He also had non-obstructing left renal calculus and extensive lumbar spinal degenerative disease. The patient was seen by Orthopedics again with a knee tap. (Followup is still remaining concerning this). The patient was also seen by Thoracic surger ar . Please note that Dopplers 08/06/2014 revealed a subacute venous thrombosis in the right popliteal vein as well as subacute thrombus in the right calf vein. The patient was seen by Dr. Meyer per Thoracic surgery and a PET scan was obtained showing the no d ule in right lung apex to demonstrate low level activity somewhat indeterminate with an SUV of 2.0. No other abnormalities were present. It was felt that the patient should proceed to surgical resection. An IVC filter was placed prior to surgery 08/13/201 4 by Dr. Pastor Trinh. PFTs revealed a moderate airway restriction and after discussion on 08/15/2014 the patient underwent exploratory bronchoscopy, exploratory right video assisted thoracoscopy with wedge resection, right upper lobe and lymphadenectom y . The patient remains hospitalized at this point to have the surgery rather than return for subsequent surgery. Pathology per surgical specimen revealed a primary lung adenocarcinoma, demonstrating a lepidic growth pattern. R4, station 7 and R10 nodes wer e all negative for  disease. The tumor size was 2.1 x 1.3 x 1.2 unifocal adenocarcinoma. His pathologic staging was a pT1b pN0M0- stage IA. Now available also at the time of this dictation is the finding that the tumor was EGFR mutation positive with a le s ion positive for E746 [id number] mutation, Exon 19. These are reported to correlate with responsiveness to EGFR tyrosine kinase inhibitor therapies. The patient was seen by Dr. Meyer postoperative and is doing well and is also being seen by Dr. Trinh with consideration of removal of his IVC filter.       The patient now presents back to our practice for review of all of this. We discussed his time line up to this point. In a long discussion it is felt that adjuvant chemotherapy is not warranted in Mr. Gonzalez's care. Reasonably followup is however likely with at least a chest x-ray on a yearly basis possibly through Dr. Gillespie.       The patient's had follow-up through Dr. CHRISTIAN Walker since his last visit here as well as reviews to Dr. Meyer. This includes evidently a trial of oral iron when the patient was found to be anemic previously as well as recognition of the patient's kidney function-CKD 3. She also was seen in emergency room in June of this year after falling from a ladder with laboratory studies revealing anemia with hemoglobin 8.5, hematocrit 30.5 and MCV that  63.3 with MCH of 17.6 and an MCHC of 27.9, platelet count of 333,000 with normal differential. In reviewing the patient's blood counts he is definitely developed microcytosis and hypochromia since June and, as such, when immediately as concerned about iron deficiency as an underlying issue. He also has chronic leukocytosis that appears to be unchanged from previous. We discussed IV iron as a treatment to address the immediate issue and thereafter try to determine the source of blood loss if indeed iron deficiency is documented.  The patient went on to be treated with both IV Feraheme ×2 on December 23 and December 30 as  well as be given injections of B12 both visits. As he seen back today January 30 he is feeling some better and his wife has also noticed a difference. We plan to continue the B12 loading over the next several weeks as well as assessing him for pernicious anemia laboratories today.  The patient is now seen back March 08, 2017. While he's had some improvement in his energy level he still has pain in his feet which has progressed associated with a mildly raised, violaceous group of lesions involving his calf and left foot have developed. He indicates he is to see a podiatrist in the near future and with the findings of pernicious anemia, ongoing iron deficiency, and unexplained rash developing in the setting of a history of non-small cell lung cancer I requested a PET/CT-? Paraneoplasia or even new malignant process.  The patient initially was to undergo a PET/CT but this was declined. A CT of chest and pelvis was obtained with no evidence of metastatic disease identified. The patient hematologically went on to further improve but has, unfortunately, considerable pain in his lower extremities associated with a rash that has a partially vasculitic appearance. The areas in question are erythematous, serpiginous, mildly raised, and very tender to palpation. He's been seen by podiatry and treated topically without effect.  The patient was thereafter seen evidently by both dermatology and vascular surgery. Unfortunately he still has significant pain though this is managed in part by current use of pain medications with modest use thus far. He is encouraged to use this more frequently and plans are to contact Dr. CHRISTIAN Walker as well.         Medical History         Past Medical History:   Diagnosis Date   • Arthralgia     • Arthritis     • Back pain     • Chronic mixed headache syndrome     • Deep venous thrombosis of right popliteal vein       of right knee hemarthrosis   • Diabetes mellitus     • Diverticulosis     • DJD  "(degenerative joint disease)     • Factor V Leiden     • GERD (gastroesophageal reflux disease)     • Hypercholesterolemia     • Hypertension     • Intracerebral hemorrhage     • Lumbar spinal stenosis     • Muscular aches     • Neck pain     • Non-small cell carcinoma of lung       Stage IA, EGFR mutated   • Obesity     • Peripheral neuropathy     • Prostate cancer       Status post seed implant for radiation therapy   • Shortness of breath     • Stroke       H/O CVA in 2012.   • Thrombosis       Lower extremity            ONCOLOGIC HISTORY: (History from previous dates can be found in the separate document.)     No current facility-administered medications on file prior to encounter.              Current Outpatient Prescriptions on File Prior to Encounter   Medication Sig Dispense Refill   • Calcium Carbonate-Vitamin D (CALCIUM-D) 600-400 MG-UNIT tablet Take 1 tablet by mouth daily.       • carvedilol (COREG) 12.5 MG tablet         • cilostazol (PLETAL) 50 MG tablet         • clotrimazole-betamethasone (LOTRISONE) 1-0.05 % cream         • HYDROcodone-acetaminophen (NORCO) 5-325 MG per tablet Take 1 tablet by mouth every 6 (six) hours as needed.       • Insulin Syringe-Needle U-100 (RELION INSULIN SYR 0.5ML/31G) 31G X 5/16\" 0.5 ML misc         • irbesartan-hydrochlorothiazide (AVALIDE) 300-12.5 MG tablet Take 1 tablet by mouth daily.       • pravastatin (PRAVACHOL) 80 MG tablet Take by mouth daily.       • rivaroxaban (XARELTO) tablet Take 20 mg by mouth daily with dinner.       • vitamin B-6 (PYRIDOXINE) 100 MG tablet Take 100 mg by mouth.             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            Social History Main Topics   • Smoking status: Former Smoker       Packs/day: 1.00       Years: 20.00       Quit date: 2/8/1996   • Smokeless tobacco: Not on file   • Alcohol use No   • Drug use: No   • Sexual activity: No                  Cancer-related family history includes Colon cancer in his mother; Lung cancer (age of onset: 77) in his brother.      Review of Systems : A comprehensive 14 point review of systems was performed and was negative except as mentioned.        Objective       Vitals:    07/10/17 1930 07/11/17 0020 07/11/17 0700 07/11/17 0914   BP: 143/69 120/56 115/68 125/66   BP Location: Left arm Left arm Left arm Left arm   Patient Position: Lying Lying Lying Lying   Pulse: 67 60 56    Resp: 16 14 15    Temp: 98.8 °F (37.1 °C) 98.4 °F (36.9 °C) 97.9 °F (36.6 °C)    TempSrc: Oral Oral Oral    SpO2: 98% 95% 96%    Weight:       Height:          Physical Exam   GENERAL: Well-developed, well-nourished male in no acute distress. Patient is still looks weak lying on bed.  SKIN: Warm, dry.   HEAD: Normocephalic.   EYES:   Conjunctivae normal.   EARS: Hearing intact.   NOSE: No nasal discharge.   MOUTH: Tongue is well papillated; no stomatitis or ulcers. Lips normal.   THROAT: Oropharynx without lesions or exudates.   LYMPHATICS: No cervical adenopathy.   CHEST: Decreased breath sounds bilateral bases.   CARDIAC: Regular rhythm and rate.  ABDOMEN: Soft, nontender with no organomegaly or masses. No abdominal pain, palpable spleen 3 cm blcm  EXTREMITIES: No clubbing, cyanosis or edema.   NEURO: No focal neurological deficits.          RECENT LABS:    Results from last 7 days  Lab Units 07/11/17  0528 07/10/17  0623 07/09/17  0618   WBC 10*3/mm3 12.61* 13.51* 11.06*   HEMOGLOBIN g/dL 12.1* 13.0* 12.6*   HEMATOCRIT % 37.6* 40.5 39.8*   PLATELETS 10*3/mm3 216 211 202       Results from last 7 days  Lab Units 07/10/17  0623 07/09/17  0618 07/06/17  0818   SODIUM mmol/L 139 139 139    POTASSIUM mmol/L 4.1 4.3 4.1   CHLORIDE mmol/L 103 104 103   CO2 mmol/L 23.5 21.3* 22.6   BUN mg/dL 34* 30* 17   CREATININE mg/dL 1.83* 1.99* 1.47*   CALCIUM mg/dL 8.5* 8.1* 8.3*   BILIRUBIN mg/dL 0.5 0.5 0.7   ALK PHOS U/L 153* 142* 171*   ALT (SGPT) U/L 18 20 34   AST (SGOT) U/L 26 36 46*   GLUCOSE mg/dL 189* 167* 85       Results from last 7 days  Lab Units 07/11/17  0528 07/10/17  0623 07/09/17  1448 07/09/17  0618   INR  3.08* 3.64* 4.49* 3.93*   APTT seconds 82.2* 75.0*  --  77.9*      Assessment/Plan   1. This patient has thrombophilia associated with FACTOR V Leiden mutation. He has had extensive thrombosis in the failure in lower extremities he has had sagittal vein thrombosis he has had a chronic anticoagulation initiated with Coumadin and subsequently with Xarelto. The patient has been taking this Xarelto religiously according to the medical records are documented in the emergency room. However submitted most is the fact that the patient has had abdominal pain  significantly for the last 2-3 months he has lost his appetite and he has lost 20 pounds in weight. They and the patient has had in March of this year reassessment of previous malignancy specifically lung cancer having no evidence for recurrence. On the thrombosis of the portal vein is an entity that can be associated with several malignancies in the gastrointestinal tract including pancreatic cancer as well as liver cancer.     His INR has further improved her today, at 3.08.  I agreed this patient needs to be only given very small dose Coumadin and monitor his labs.  He is scheduled to have 0.5 mg Coumadin this evening.  I explained to patient and the his daughter today.  Its possible he will be discharged to rehabilitation facility today, his INR needs to be monitored on a daily basis.      2. significant anemia related to iron deficiency and he was treated subsequently for these by  in the summer of 2016.  Laboratory study on  7/4/2017 reported a reasonable ferritin 220, although low iron saturation 6% with iron 13 and a TIBC 207.  He had a super vitamin B12 level at 2000 pg/mL.  Keep in mind this patient has significant elevated CRP.  Workup for malignancy has been negative for or tumor markers including CA-19-9, AFP and PSA.      3.  Poor oral intake with significant weight loss.. Patient was seen by nutrition counseling service yesterday on 7/10/2017.  He was started on Glucerna Shake as supplement to his meal.  Patient reports improved appetite.  Hopefully improved oral intake, his treatment with Coumadin will also be easier.     Plan:   1.  Agree starting small dose Coumadin 0.5 mg this evening and monitor PT/INR in the morning.  Target INR between 2-3.     2. If patient is discharged then he has a follow-up appointment for PT/INR check in our office with plans of doing it once a week in our office with nurse review and subsequently he follow-up with the Dr. Coley as scheduled on August 2, 2017.     3.  Continue physical therapy.      We will sign off.  Please call if needed.     Discussed that with the patient today.       YUSRA CABALLERO M.D., Ph.D.    7/11/17

## 2017-07-11 NOTE — PROGRESS NOTES
History:   He is awake and alert today.  Patient is looking forward to breakfast.  He cooperated with physical therapy yesterday.  He denies shortness of breath or significant pain.  He just complains of profound weakness.    Allergies  Review of patient's allergies indicates no known allergies.      Current Facility-Administered Medications:   •  dextrose (D50W) solution 25 g, 25 g, Intravenous, Q15 Min PRN, Landry Ho MD, 25 g at 07/08/17 0929  •  dextrose (D50W) solution 25 g, 25 g, Intravenous, Q15 Min PRN, Ceferino Gillespie MD, 25 g at 07/08/17 0751  •  dextrose (GLUTOSE) oral gel 15 g, 15 g, Oral, Q15 Min PRN, Landry Ho MD  •  dextrose (GLUTOSE) oral gel 15 g, 15 g, Oral, Q15 Min PRN, Ceferino Gillespie MD  •  donepezil (ARICEPT) tablet 5 mg, 5 mg, Oral, Nightly, Ceferino Gillespie MD, 5 mg at 07/10/17 2020  •  glucagon (human recombinant) (GLUCAGEN DIAGNOSTIC) injection 1 mg, 1 mg, Subcutaneous, Q15 Min PRN, Landry Ho MD  •  glucagon (human recombinant) (GLUCAGEN DIAGNOSTIC) injection 1 mg, 1 mg, Subcutaneous, Q15 Min PRN, Ceferino Gillespie MD  •  HYDROcodone-acetaminophen (NORCO) 5-325 MG per tablet 1 tablet, 1 tablet, Oral, Q4H PRN, Ceferino Gillespie MD, 1 tablet at 07/10/17 0905  •  insulin aspart (novoLOG) injection 0-9 Units, 0-9 Units, Subcutaneous, 4x Daily With Meals & Nightly, Ceferino Gillespie MD, 7 Units at 07/10/17 2149  •  insulin detemir (LEVEMIR) injection 30 Units, 30 Units, Subcutaneous, Nightly, Ceferino Gillespie MD, 30 Units at 07/10/17 2150  •  levETIRAcetam (KEPPRA) tablet 500 mg, 500 mg, Oral, BID, Ceferino Gillespie MD, 500 mg at 07/10/17 1755  •  morphine injection 1 mg, 1 mg, Intravenous, Q4H PRN **AND** naloxone (NARCAN) injection 0.4 mg, 0.4 mg, Intravenous, Q5 Min PRN, Ceferino Gillespie MD  •  nadolol (CORGARD) tablet 20 mg, 20 mg, Oral, Q24H, Tim Xie MD, 20 mg at 07/10/17 0900  •  oxybutynin (DITROPAN) tablet 5 mg, 5 mg, Oral, Daily, Ceferino Gillespie MD, 5 mg at 07/10/17  "0900  •  pantoprazole (PROTONIX) EC tablet 40 mg, 40 mg, Oral, Q AM, Ceferino Gillespie MD, 40 mg at 07/11/17 0549  •  sodium chloride 0.9 % flush 1-10 mL, 1-10 mL, Intravenous, PRN, Ceferino Gillespie MD  •  sodium chloride 0.9 % flush 10 mL, 10 mL, Intravenous, PRN, Sher Siddiqui MD  •  traZODone (DESYREL) tablet 50 mg, 50 mg, Oral, Nightly, Ceferino Gillespie MD, 50 mg at 07/10/17 2020  •  valsartan (DIOVAN) tablet 160 mg, 160 mg, Oral, Q24H, Ceferino Gillespie MD, 160 mg at 07/10/17 0900    /56 (BP Location: Left arm, Patient Position: Lying)  Pulse 60  Temp 98.4 °F (36.9 °C) (Oral)   Resp 14  Ht 69\" (175.3 cm)  Wt 158 lb 12.8 oz (72 kg)  SpO2 95%  BMI 33.37 kg/m2    Physical Exam     Neck: JVD physiologic.  Heart: Slow regular rhythm.  Lungs: Clear to auscultation.  Abdomen: Bowel sounds present, nontender.  Extremities: No edema.    Lab Results (last 24 hours)     Procedure Component Value Units Date/Time    Comprehensive Metabolic Panel [[id number]]  (Abnormal) Collected:  07/10/17 0623    Specimen:  Blood Updated:  07/10/17 0757     Glucose 189 (H) mg/dL      BUN 34 (H) mg/dL      Creatinine 1.83 (H) mg/dL      Sodium 139 mmol/L      Potassium 4.1 mmol/L      Chloride 103 mmol/L      CO2 23.5 mmol/L      Calcium 8.5 (L) mg/dL      Total Protein 5.9 (L) g/dL      Albumin 2.30 (L) g/dL      ALT (SGPT) 18 U/L      AST (SGOT) 26 U/L      Alkaline Phosphatase 153 (H) U/L      Total Bilirubin 0.5 mg/dL      eGFR Non African Amer 36 (L) mL/min/1.73      Globulin 3.6 gm/dL      A/G Ratio 0.6 g/dL      BUN/Creatinine Ratio 18.6     Anion Gap 12.5 mmol/L     Narrative:       The MDRD GFR formula is only valid for adults with stable renal function between ages 18 and 70.    POC Glucose Fingerstick [[id number]]  (Normal) Collected:  07/10/17 0758    Specimen:  Blood Updated:  07/10/17 0800     Glucose 126 mg/dL     Narrative:       Meter: [id number] : [id number] Michelle ZHENG    POC Glucose Fingerstick [[id number]]  " (Abnormal) Collected:  07/10/17 1106    Specimen:  Blood Updated:  07/10/17 1107     Glucose 149 (H) mg/dL     Narrative:       Meter: [id number] : [id number] Michelle ZHENG    CBC & Differential [[id number]] Collected:  07/10/17 0623    Specimen:  Blood Updated:  07/10/17 1108    Narrative:       The following orders were created for panel order CBC & Differential.  Procedure                               Abnormality         Status                     ---------                               -----------         ------                     Scan Slide[[id number]]                                                                  CBC Auto Differential[[id number]]        Abnormal            Final result                 Please view results for these tests on the individual orders.    POC Glucose Fingerstick [[id number]]  (Abnormal) Collected:  07/10/17 1659    Specimen:  Blood Updated:  07/10/17 1701     Glucose 238 (H) mg/dL     Narrative:       Meter: [id number] : [id number] Michelle ZHENG    POC Glucose Fingerstick [[id number]]  (Abnormal) Collected:  07/10/17 2114    Specimen:  Blood Updated:  07/10/17 2115     Glucose 320 (H) mg/dL     Narrative:       Meter: [id number] : [id number] Michelle ZHENG    Lupus Anticoag / Cardiolipin Ab [[id number]]  (Abnormal) Collected:  07/04/17 1124    Specimen:  Blood Updated:  07/11/17 0614     Protime 14.6 (H) sec       Reference Range:  18 years and older: 9.6 - 11.5        INR 1.4 (H) ratio       Reference Range:  18 years and older: 0.9 - 1.1        aPTT 51.5 (H) sec       The aPTT is 33.8 seconds following heparin neutralization.  This test has not been validated for monitoring  unfractionated heparin therapy.  aPTT-based therapeutic  ranges for unfractionated heparin therapy have not been  established.  Consider ordering Heparin anti-Xa  (unfractionated).  Reference Range:  18 years and older: 22.9 - 30.2        APTT 1:1 NP 28.7 sec       Assay performed following  heparin neutralization.  Reference Range:  18 years and older: 22.9 - 30.2        APTT 1:1 Saline 51.1 Sec       Assay performed following heparin neutralization.        Thrombin Time 29.5 (H) sec       The thrombin time is 22.2 seconds following heparin  neutralization.  Reference Range:  0.0 - 23.0                **Please note reference interval change**        DRVVT Screen Seconds 61.1 (H) sec       Reference Range:  <= 47.0        DRVVT Confirm Seconds 66.5 sec      DRVVT/Confirm Ratio 0.8 ratio       Reference Range:  0.8 - 1.2        Hex Phosph Neut Test 8 sec       Assay performed following heparin neutralization.  This value is NEGATIVE.  This is a qualitative assay and is therefore reported as  positive for lupus anticoagulant or negative.  The  quantitative value is provided as an aid in diagnosis.  Reference Range:  0 - 11        Anticardiolipin IgG <10 GPL       Reference Range:  Negative: <15  Indeterminate: 15 - 20  Low to medium positive: >20 - 80  High positive: >80        Anticardiolipin IgM <10 MPL       Reference Range:  Negative: <13  Indeterminate: 13 - 20  Low to medium positive: >20 - 80  High positive: >80        Beta-2 Glyco 1 IgG <10 SGU       The reference interval reflects a 3SD or 99th percentile  interval, which is thought to represent a potentially  clinically significant result in accordance with the  International Consensus Statement on the classification  criteria for definitive antiphospholipid syndrome (APS). J  Thromb Rjyr4170;4:295-306.  Reference Range:  Negative: <21        Beta-2 Glyco 1 IgM <10 SMU       The reference interval reflects a 3SD or 99th percentile  interval, which is thought to represent a potentially  clinically significant result in accordance with the  International Consensus Statement on the classification  criteria for definitive antiphospholipid syndrome (APS). J  Thromb Nvff4133;4:295-306.  Reference Range:  Negative: <33        Beta-2 Glyco 1 IgA <10 SUELLEN        The reference interval reflects a 3SD or 99th percentile  interval.  Reference Range:  Negative: <26        LAC Interpretation Comment      The APTT is prolonged. A lupus anticoagulant is not  detected. Anticoagulant therapy (vitamin K antagonist,  direct Xa inhibitor, direct thrombin inhibitor (DTI) or  heparin therapy) may prolong the APTT. All antiphospholipid  antibodies evaluated are normal. As antibody titers may  fluctuate with time, repeat testing may be indicated and  ideally should be performed in the absence of anticoagulant  therapy. Please contact navigaya if evaluation  of the prolonged APTT or further clarification is needed.       Narrative:       Performed at:  [address]  [facility]  [address]  [id number]  : Mariola De La Torre MD, Phone:  [phone]    aPTT [[id number]]  (Abnormal) Collected:  07/11/17 0528    Specimen:  Blood Updated:  07/11/17 0619     PTT 82.2 (H) seconds     Protime-INR [[id number]]  (Abnormal) Collected:  07/11/17 0528    Specimen:  Blood Updated:  07/11/17 0619     Protime 30.9 (H) Seconds      INR 3.08 (H)    CBC & Differential [[id number]] Collected:  07/11/17 0528    Specimen:  Blood Updated:  07/11/17 0717    Narrative:       The following orders were created for panel order CBC & Differential.  Procedure                               Abnormality         Status                     ---------                               -----------         ------                     Scan Slide[[id number]]                                       Final result               CBC Auto Differential[[id number]]        Abnormal            Final result                 Please view results for these tests on the individual orders.    CBC Auto Differential [[id number]]  (Abnormal) Collected:  07/11/17 0528    Specimen:  Blood Updated:  07/11/17 0717     WBC 12.61 (H) 10*3/mm3      RBC 4.62 10*6/mm3      Hemoglobin 12.1 (L) g/dL      Hematocrit  37.6 (L) %      MCV 81.4 fL      MCH 26.2 (L) pg      MCHC 32.2 (L) g/dL      RDW 16.2 (H) %      RDW-SD 48.7 fl      Platelets 216 10*3/mm3      Neutrophil % 77.9 (H) %      Lymphocyte % 9.6 (L) %      Monocyte % 9.0 %      Eosinophil % 2.1 %      Basophil % 0.6 %      Immature Grans % 0.8 (H) %      Neutrophils, Absolute 9.83 (H) 10*3/mm3      Lymphocytes, Absolute 1.21 10*3/mm3      Monocytes, Absolute 1.13 10*3/mm3      Eosinophils, Absolute 0.27 10*3/mm3      Basophils, Absolute 0.07 10*3/mm3      Immature Grans, Absolute 0.10 (H) 10*3/mm3      nRBC 0.0 /100 WBC     Scan Slide [[id number]] Collected:  07/11/17 0528    Specimen:  Blood Updated:  07/11/17 0717     Anisocytosis Mod/2+     Ovalocytes Slight/1+     Spherocytes Slight/1+     WBC Morphology Normal     Platelet Morphology Normal          Imp:  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  We'll add a tiny dose of Coumadin today.    GI bleed/esophageal varices/anemia; this appears stable.  No sign of active bleeding.    Type 2 diabetes mellitus; fair control.    Hypertension; controlled.    Immobilization syndrome; patient requires physical therapy.    Disposition; patient may be discharged today.  I will be happy to discharge later this afternoon if placement is established.          Plan:  As above.          Gigi Gillespie Jr., MD  7/11/2017  7:23 AM

## 2017-07-11 NOTE — NURSING NOTE
Report called to April at [facility].  Pt going into room 12.  Family updated.     Awaiting ambulance arrival

## 2017-07-11 NOTE — PLAN OF CARE
Problem: Patient Care Overview (Adult)  Goal: Plan of Care Review  Outcome: Ongoing (interventions implemented as appropriate)    07/11/17 0307 07/11/17 1610   Coping/Psychosocial Response Interventions   Plan Of Care Reviewed With --  patient   Patient Care Overview   Progress improving --    Outcome Evaluation   Outcome Summary/Follow up Plan --  Pt stable, c/o pain prn- medicated as needed. vitals stable. being discharged to SNF today. leaving by ambulance at 1730.

## 2017-07-11 NOTE — PROGRESS NOTES
Discharge summary        Date of admission: 7/3/2017.  Date of discharge: 7/11/2017.        Discharge diagnoses:    Thrombophilia/factor V Leiden    Acute portal vein thrombosis/acute thrombosis of superior splenic vein    Splenomegaly with 3 evolving splenic infarcts    Anemia/heme positive stool    Esophageal varices    Type 2 diabetes mellitus    Hypertension    Chronic peripheral neuropathy.    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  Some mild residual left upper extremity weakness.    Left rotator cuff tear    History of adenocarcinoma of the lung August 2014.  Treated with surgical resection.    History of adenocarcinoma prostate 2003.  Treated with radiation seed implant.    Brief history:  77-year-old white male with history of prior repair sagittal sinus thrombosis with cranial bleed in 2012 and prior history of DVT presented to the emergency room with acute onset of severe abdominal pain.  Workup was revealing for portal vein thrombosis.  Patient was also noted to have acute thrombosis of the splenic vein with splenomegaly and 3 following splenic infarcts This patient had been taking Eliquis religiously..  Despite this treatment he developed portal vein thrombosis.    Patient required hospitalization.  Patient was begun on heparin drip.  Patient was seen by Dr. Trinh, vascular surgeon.  Since patient's pain had resolved with the heparin drip he did not feel that any intervention was necessary.     He was seen in consultation by hematology  Patient was changed from Eliquis / Plavix to Heparin drip initially.  Then changed to Lovenox.  Now patient has been transitioned to Coumadin.  His abdominal pain has resolved.    Patient was found to have heme positive stools.  Upper endoscopy was performed by Dr. Xie on July 6 revealing esophageal and gastric varices.   Felt that these are on the basis of portal hypertension.  He was treated with Protonix.  His hemoglobin has remained stable at approximately 12.  There does not appear to be any active bleeding at this time.  Patient had a colonoscopy in October 2015 showing only to 3-6 mm polyps and some hemorrhoids.  Since his colonoscopy was relatively recently it was felt that he did not require another colonoscopy at this time.    Thrombosis can associated with a occult malignancies.  The patient underwent an evaluation including CA-19-9, alpha-fetoprotein level, and PSA.  These blood tests were negative.  He also underwent CT scan of the abdomen and pelvis that did not reveal any malignancy.  Chest x-ray was also clear.    This patient has been very weak.  He has had trouble ambulating.  His mentation has been poor.  Concerned that he might of had a new CVA.  A repeat MRI scan of the brain was performed.  No change compared with his old MRIs   It did reveal changes consistent with the superior sagittal thrombosis and intercranial bleed that occurred 2012 but there were no new findings.      This patient's pro time became overly thin with the anticoagulation.  The pro time INR was 3.9.  Therefore Coumadin was held for a couple days.  Today the pro time INR is 3.05 patient was given half a milligram of Coumadin today.  We will need to check daily pro times.  We will need to adjust the Coumadin depending on the pro time.  Skin be called to my office at [phone] were called me personally on my cell phone at [phone].  At that point I will adjust the daily Coumadin.  Once we have stable levels we can stretch out the pro times and not do these daily.      This patient will need continued physical therapy.      This man sugars have been under pretty good control with Levemir insulin and sliding scale.  Will continue this treatment in the rehabilitation center.            Discharge medications:  Aricept 5 mg by mouth daily.  Humalog  insulin via sliding scale  Levemir 30 units subcutaneous daily at bedtime  Keppra 500 mg by mouth twice a day  Leanna Barney 20 mg by mouth daily  Ditropan 5 mg by mouth daily  Protonix 40 mg by mouth daily  Trazodone 50 mg by mouth daily at bedtime  Diovan 160 mg by mouth daily  Coumadin 0.5 mg by mouth daily.  Norco 5/325 mg 1 by mouth every 6-8 hours when necessary pain  Pravachol 80 mg by mouth daily  Vitamin B6 100 mg by mouth daily.    Diet 2000-calorie ADA    Accu-Cheks before meals and at bedtime    Activity: Ad abdirashid. as per physical therapy.    Check ProTime INR daily and call the results to me at my office [phone] were my cell phone [phone].    I will continue to follow patient at [facility].        MICHELLE Gillespie Jr, M.D.

## 2017-07-11 NOTE — PROGRESS NOTES
Continued Stay Note  [facility]     Patient Name: Alfredo Gonzalez  MRN: [id number]  Today's Date: 7/11/2017    Admit Date: 7/3/2017          Discharge Plan       07/11/17 1004    Case Management/Social Work Plan    Additional Comments Both [facility] and [facility] has accepted and have a bed today.  Patient and family have decided to go to [facility].  Dr Gillespie updated.  Anticipate dc this afternoon.               Discharge Codes     None            Yara Cervantes RN

## 2017-07-11 NOTE — PLAN OF CARE
Problem: Patient Care Overview (Adult)  Goal: Plan of Care Review  Outcome: Ongoing (interventions implemented as appropriate)    07/11/17 1132   Coping/Psychosocial Response Interventions   Plan Of Care Reviewed With patient   Outcome Evaluation   Outcome Summary/Follow up Plan Pt with reports of increased LBP and fatigue limiting ambulation endurance this date, BIBIANA Rodriguez alerted. Pt planning to possibly discharge to rehab this PM. No other concerns at this time.

## 2017-07-11 NOTE — THERAPY TREATMENT NOTE
Acute Care - Physical Therapy Treatment Note  [facility]     Patient Name: Alfredo Gonzalez  : 1940  MRN: [id number]  Today's Date: 2017  Onset of Illness/Injury or Date of Surgery Date: 17     Referring Physician: Verna    Admit Date: 7/3/2017    Visit Dx:    ICD-10-CM ICD-9-CM   1. Acute venous thrombosis I82.90 453.9   2. Generalized abdominal pain R10.84 789.07   3. General weakness R53.1 780.79   4. Portal vein thrombosis I81 452   5. Iron deficiency anemia, unspecified iron deficiency anemia type D50.9 280.9     Patient Active Problem List   Diagnosis   • Non-small cell lung cancer   • Chest pain   • Chronic kidney disease (CKD)   • Microcytic hypochromic anemia   • History of prostate cancer   • Anemia, B12 deficiency   • Iron deficiency anemia   • Pernicious anemia   • Portal vein thrombosis   • Portal vein thrombosis               Adult Rehabilitation Note       17 1115 07/10/17 1313 17 1500    Rehab Assessment/Intervention    Discipline physical therapy assistant  -JONAS physical therapy assistant  -JONAS physical therapist  -WILLIAM    Document Type therapy note (daily note)  -JONAS therapy note (daily note)  -JONAS therapy note (daily note)  -WILLIAM    Subjective Information agree to therapy  -JONAS agree to therapy  -JONAS agree to therapy  -WILLIAM    Patient Effort, Rehab Treatment good  -JONAS good  -JONAS good  -WILLIAM    Precautions/Limitations fall precautions;oxygen therapy device and L/min   2L  -OJNAS fall precautions;oxygen therapy device and L/min   2L  -JONAS oxygen therapy device and L/min   2L  -WILLIAM    Recorded by [JONAS] Pastor Duran PTA [JONAS] Pastor Duran PTA [WILLIAM] Lissy Lopez, PT    Vital Signs    Pre SpO2 (%)  96  -JONAS     O2 Delivery Pre Treatment  supplemental O2  -JONAS     Post SpO2 (%)  95  -JONAS     O2 Delivery Post Treatment  supplemental O2  -JONAS     Pre Patient Position  Supine  -JONAS     Intra Patient Position  Standing  -JONAS     Post Patient Position  Sitting  -JONAS     Recorded by  [JONAS] Pastor Duran  PTA     Pain Assessment    Pain Assessment 0-10  -JONAS No/denies pain  -JONAS No/denies pain  -WILLIAM    Pain Score 7  -JONAS      Post Pain Score 7  -JONAS      Pain Type Chronic pain  -JONAS      Pain Location Back  -JONAS      Pain Orientation Lower  -JONAS      Pain Intervention(s) Ambulation/increased activity;Repositioned;Rest  -JONAS      Recorded by [JONAS] Pastor Duran PTA [JONAS] Pastor Duran PTA [WILLIAM] Lissy Lopez, PT    Cognitive Assessment/Intervention    Current Cognitive/Communication Assessment functional  -JONAS functional  -JONAS functional  -WILLIAM    Orientation Status oriented x 4  -JONAS oriented x 4  -JONAS oriented x 4  -WILLIAM    Follows Commands/Answers Questions 100% of the time  -JONAS 100% of the time  -JONAS     Personal Safety WNL/WFL  -JONAS WNL/WFL  -JONAS WNL/WFL  -WILLIAM    Personal Safety Interventions fall prevention program maintained;gait belt;nonskid shoes/slippers when out of bed  -JONAS fall prevention program maintained;gait belt;nonskid shoes/slippers when out of bed  -JONAS fall prevention program maintained;gait belt;nonskid shoes/slippers when out of bed  -WILLIAM    Recorded by [JONAS] Pastor Duran PTA [JONAS] Pastor Duran PTA [WILLIAM] Lissy Lopez, PT    Bed Mobility, Assessment/Treatment    Bed Mobility, Assistive Device bed rails;head of bed elevated  -JONAS bed rails;head of bed elevated  -JONAS bed rails;head of bed elevated  -WILLIAM    Bed Mob, Supine to Sit, [place] supervision required;verbal cues required  -JONAS supervision required;verbal cues required  -JONAS conditional independence  -WILLIAM    Bed Mob, Sit to Supine, [place] not tested  -JONAS not tested  -JONAS supervision required;verbal cues required  -WILLIAM    Bed Mobility, Safety Issues decreased use of arms for pushing/pulling;decreased use of legs for bridging/pushing  -JONAS decreased use of arms for pushing/pulling;decreased use of legs for bridging/pushing  -JONAS     Bed Mobility, Impairments strength decreased  -JONAS strength decreased  -JONAS     Recorded by [JONAS] Pastor Duran PTA [JONAS] Pastor Duran PTA  [WILLIAM] Lissy Lopez, PT    Transfer Assessment/Treatment    Transfers, Sit-Stand [place] supervision required;verbal cues required  -JONAS supervision required;verbal cues required  -JONAS stand by assist;verbal cues required  -WILLIAM    Transfers, Stand-Sit [place] supervision required;verbal cues required  -JONAS supervision required;verbal cues required  -JONAS stand by assist;verbal cues required  -WILLIAM    Transfers, Sit-Stand-Sit, Assist Device rolling walker  -JONAS rolling walker  -JONAS rolling walker  -WILLIAM    Transfer, Safety Issues balance decreased during turns  -JONAS balance decreased during turns  -JONAS     Transfer, Impairments strength decreased;impaired balance  -JONAS strength decreased;impaired balance  -JONAS     Recorded by [JONAS] Pastor Duran PTA [JONAS] Pastor Duran PTA [WILLIAM] Lissy Lopez, PT    Gait Assessment/Treatment    Gait, [place] Level contact guard assist;verbal cues required;nonverbal cues required (demo/gesture)  -JONAS contact guard assist;verbal cues required;nonverbal cues required (demo/gesture)  -JONAS stand by assist;verbal cues required  -WILLIAM    Gait, Assistive Device rolling walker  -JONAS rolling walker  -JONAS rolling walker  -WILLIAM    Gait, Distance (Feet) 200  -JONAS 300  -JONAS 300  -WILLIAM    Gait, Gait Deviations jake decreased;forward flexed posture;limb motion velocity decreased;step length decreased;toe-to-floor clearance decreased  -JONAS jake decreased;forward flexed posture;limb motion velocity decreased;step length decreased;toe-to-floor clearance decreased  -JONAS     Gait, Safety Issues step length decreased;balance decreased during turns;supplemental O2  -JONAS step length decreased;balance decreased during turns;supplemental O2  -JONAS     Gait, Impairments strength decreased;impaired balance  -JONAS strength decreased;impaired balance  -JONAS     Recorded by [JONAS] Pastor Duran PTA [JONAS] Pastor Duran, MAGUI [WILLIAM] Lissy Lopez, PT    Therapy Exercises    Bilateral Lower Extremities AROM:;10 reps;ankle  pumps/circles;LAQ;hip flexion  -JONAS AROM:;10 reps;ankle pumps/circles;LAQ;hip flexion  -JONAS     Recorded by [JONAS] Pastor Duran PTA [JONAS] Pastor Duran PTA     Positioning and Restraints    Pre-Treatment Position in bed  -JONAS in bed  -JONAS in bed  -WILLIAM    Post Treatment Position bed  -JONAS chair  -JONAS bed  -WILLIAM    In Bed supine;call light within reach;encouraged to call for assist;exit alarm on;with family/caregiver;notified nsg  -JONAS  supine;call light within reach;notified nsg;exit alarm on  -WILLIAM    In Chair  sitting;call light within reach;encouraged to call for assist;exit alarm on;with family/caregiver  -JONAS     Recorded by [JONAS] Pastor Duran PTA [JONAS] Pastor Duran PTA [WILLIAM] Lissy Lopez, PT      User Key  (r) = Recorded By, (t) = Taken By, (c) = Cosigned By    Initials Name Effective Dates    WILLIAM Lissy Lopez, PT 10/06/15 -     JONAS Pastor Duran PTA 04/24/15 -                 IP PT Goals       07/05/17 1150          Transfer Training PT LTG    Transfer Training PT LTG, Date Established 07/05/17  -EE (r) ELICEO (t) EE (c)      Transfer Training PT LTG, Time to Achieve 1 wk  -EE (r) ELICEO (t) EE (c)      Transfer Training PT LTG, Activity Type all transfers  -EE (r) ELICEO (t) EE (c)      Transfer Training PT LTG, [place] Level supervision required  -EE (r) ELICEO (t) EE (c)      Transfer Training PT LTG, Assist Device walker, rolling  -EE (r) ELICEO (t) EE (c)      Gait Training PT LTG    Gait Training Goal PT LTG, Date Established 07/05/17  -EE (r) ELICEO (t) EE (c)      Gait Training Goal PT LTG, Time to Achieve 1 wk  -EE (r) ELICEO (t) EE (c)      Gait Training Goal PT LTG, [place] Level supervision required  -EE (r) ELICEO (t) EE (c)      Gait Training Goal PT LTG, Assist Device walker, rolling  -EE (r) ELICEO (t) EE (c)      Gait Training Goal PT LTG, Distance to Achieve 300  -EE (r) ELICEO (t) EE (c)      Stair Training PT LTG    Stair Training Goal PT LTG, Date Established 07/05/17  -EE (r) ELICEO (t) EE (c)      Stair Training Goal PT LTG, Time to  Achieve 1 wk  -EE (r) ELICEO (t) EE (c)      Stair Training Goal PT LTG, Number of Steps 5  -EE (r) ELICEO (t) EE (c)      Stair Training Goal PT LTG, [place] Level contact guard assist  -EE (r) ELICEO (t) EE (c)      Stair Training Goal PT LTG, Assist Device 1 handrail  -EE (r) ELICEO (t) EE (c)        User Key  (r) = Recorded By, (t) = Taken By, (c) = Cosigned By    Initials Name Provider Type    EE Nida Mitchell, PT Physical Therapist    ELICEO Santacruz, PT Student PT Student          Physical Therapy Education     Title: PT OT SLP Therapies (Done)     Topic: Physical Therapy (Done)     Point: Mobility training (Done)    Learning Progress Summary    Learner Readiness Method Response Comment Documented by Status   Patient Acceptance E VU,NR  JONAS 07/11/17 1132 Done    Acceptance E VU,NR   07/10/17 1336 Done    Acceptance E VU,NR   07/08/17 1716 Done    Acceptance E VU   07/07/17 1518 Done    Acceptance E VU,[facility] 07/06/17 1151 Done    Acceptance E VU,[facility] 07/05/17 1149 Done   Family Acceptance E VU,[facility] 07/05/17 1149 Done               Point: Home exercise program (Done)    Learning Progress Summary    Learner Readiness Method Response Comment Documented by Status   Patient Acceptance E VU,NR  JONAS 07/11/17 1132 Done    Acceptance E VU,NR   07/10/17 1336 Done    Acceptance E VU,[facility] 07/06/17 1151 Done    Acceptance E VU,NR   07/05/17 1149 Done   Family Acceptance E VU,[facility] 07/05/17 1149 Done               Point: Body mechanics (Done)    Learning Progress Summary    Learner Readiness Method Response Comment Documented by Status   Patient Acceptance E VU,NR  JONAS 07/11/17 1132 Done    Acceptance E VU,NR   07/10/17 1336 Done    Acceptance E VU,[facility] 07/05/17 1149 Done   Family Acceptance E VU,[facility] 07/05/17 1149 Done               Point: Precautions (Done)    Learning Progress Summary    Learner Readiness Method Response Comment Documented by Status   Patient Acceptance E VU,NR  JONAS 07/11/17 1132 Done     Acceptance E VU,NR  JONAS 07/10/17 1336 Done    Acceptance E VU,NR  ELICEO 07/05/17 1149 Done   Family Acceptance E VU,NR  ELICEO 07/05/17 1149 Done                      User Key     Initials Effective Dates Name Provider Type Discipline     05/18/15 -  Yamel Hodgson, PT Physical Therapist PT    WILLIAM 10/06/15 -  Lissy Lopez, PT Physical Therapist PT    JW 02/18/16 -  Lois Mccray, PTA Physical Therapy Assistant PT    JONAS 04/24/15 -  Pastor Duran, PTA Physical Therapy Assistant PT    ELICEO 05/08/17 -  Leighton Santacruz, PT Student PT Student PT                    PT Recommendation and Plan  Anticipated Equipment Needs At Discharge: front wheeled walker  Anticipated Discharge Disposition: home with home health  Planned Therapy Interventions: balance training, gait training, home exercise program, patient/family education, strengthening  PT Frequency: daily  Plan of Care Review  Plan Of Care Reviewed With: patient  Outcome Summary/Follow up Plan: Pt with reports of increased LBP and fatigue limiting ambulation endurance this date, BIBIANA Rodriguez alerted.  Pt planning to possibly discharge to rehab this PM.  No other concerns at this time.           Outcome Measures       07/11/17 1100 07/10/17 1300 07/08/17 1700    How much help from another person do you currently need...    Turning from your back to your side while in flat bed without using bedrails? 4  -JONAS 4  -JONAS 4  -WILLIAM    Moving from lying on back to sitting on the side of a flat bed without bedrails? 4  -JONAS 4  -JONAS 4  -WILLIAM    Moving to and from a bed to a chair (including a wheelchair)? 3  -JONAS 3  -JONAS 4  -WILLIAM    Standing up from a chair using your arms (e.g., wheelchair, bedside chair)? 3  -JONAS 3  -JONAS 4  -WILLIAM    Climbing 3-5 steps with a railing? 3  -JONAS 3  -JONAS 3  -WILLIAM    To walk in hospital room? 3  -JONAS 3  -JONAS 4  -WILLIAM    AM-PAC 6 Clicks Score 20  -JONAS 20  -JONAS 23  -WILLIAM    Functional Assessment    Outcome Measure Options AM-PAC 6 Clicks Basic Mobility (PT)  -JONAS AM-PAC 6 Clicks Basic  Mobility (PT)  -JONAS AM-PAC 6 Clicks Basic Mobility (PT)  -WILLIAM      User Key  (r) = Recorded By, (t) = Taken By, (c) = Cosigned By    Initials Name Provider Type    WILLIAM Lopez, PT Physical Therapist    JONAS Duran PTA Physical Therapy Assistant           Time Calculation:         PT Charges       07/11/17 1132          Time Calculation    Start Time 1115  -JONAS      Stop Time 1129  -JONAS      Time Calculation (min) 14 min  -JONAS      PT Received On 07/11/17  -JONAS      PT - Next Appointment 07/12/17  -JONAS        User Key  (r) = Recorded By, (t) = Taken By, (c) = Cosigned By    Initials Name Provider Type    JONAS Duran PTA Physical Therapy Assistant          Therapy Charges for Today     Code Description Service Date Service Provider Modifiers Qty    39140990786 HC PT THER PROC EA 15 MIN 7/10/2017 Pastor Duran PTA GP 1    76799948650 HC PT THER PROC EA 15 MIN 7/11/2017 Pastor Duran PTA GP 1          PT G-Codes  Outcome Measure Options: AM-PAC 6 Clicks Basic Mobility (PT)    Pastor Duran PTA  7/11/2017

## 2017-07-11 NOTE — PLAN OF CARE
Problem: Patient Care Overview (Adult)  Goal: Plan of Care Review  Outcome: Ongoing (interventions implemented as appropriate)    07/11/17 0307   Coping/Psychosocial Response Interventions   Plan Of Care Reviewed With patient   Patient Care Overview   Progress improving   Outcome Evaluation   Outcome Summary/Follow up Plan Resting well. VSS. No new concerns. Probable DC to rehab today.       Goal: Adult Individualization and Mutuality  Outcome: Ongoing (interventions implemented as appropriate)  Goal: Discharge Needs Assessment  Outcome: Ongoing (interventions implemented as appropriate)    Problem: Skin Integrity Impairment, Risk/Actual (Adult)  Goal: Skin Integrity/Wound Healing  Outcome: Ongoing (interventions implemented as appropriate)    Problem: Fall Risk (Adult)  Goal: Absence of Falls  Outcome: Ongoing (interventions implemented as appropriate)    Problem: Diabetes, Type 2 (Adult)  Goal: Signs and Symptoms of Listed Potential Problems Will be Absent or Manageable (Diabetes, Type 2)  Outcome: Ongoing (interventions implemented as appropriate)

## 2017-07-18 NOTE — TELEPHONE ENCOUNTER
[facility]  Dr. Gillespie is monitoring pt/inr.  Patient does not need to keep appts with us for coag.  Will keep appt with Dr. sanders for Aug 2nd.

## 2017-07-22 PROBLEM — K65.2 SPONTANEOUS BACTERIAL PERITONITIS (HCC): Status: ACTIVE | Noted: 2017-01-01

## 2017-07-29 LAB — CALR EXON 9 MUT ANL BLD/T: NORMAL

## 2017-07-31 LAB
BACTERIA FLD CULT: NO GROWTH
GRAM STN SPEC: NORMAL
GRAM STN SPEC: NORMAL

## 2017-08-02 ENCOUNTER — APPOINTMENT (OUTPATIENT)
Dept: LAB | Facility: HOSPITAL | Age: 77
End: 2017-08-02

## 2017-08-02 ENCOUNTER — APPOINTMENT (OUTPATIENT)
Dept: ONCOLOGY | Facility: CLINIC | Age: 77
End: 2017-08-02

## 2017-08-02 LAB — REF LAB TEST METHOD: NORMAL

## 2017-08-07 LAB
CELLS ANALYZED: 0
CELLS ANALYZED: 200
CELLS COUNTED CVS: 0
CELLS COUNTED: 200
CELLS KARYOTYPED.TOTAL AMN: 0
CHROM ANALY RESULT (ISCN): NORMAL
DIAGNOSTIC IMP SPEC-IMP: NORMAL
INTERPRETATION: NORMAL
ISCN BAND LEVEL QL: NORMAL
LAB DIRECTOR NAME PROVIDER: NORMAL
LAB DIRECTOR NAME PROVIDER: NORMAL
MICRODELETION SYND BLD/T FISH: NORMAL
SPECIMEN SOURCE: NORMAL
SPECIMEN SOURCE: NORMAL

## 2017-08-23 ENCOUNTER — APPOINTMENT (OUTPATIENT)
Dept: ONCOLOGY | Facility: HOSPITAL | Age: 77
End: 2017-08-23

## (undated) DEVICE — CANN NASL CO2 TRULINK W/O2 A/

## (undated) DEVICE — Device: Brand: DEFENDO AIR/WATER/SUCTION AND BIOPSY VALVE

## (undated) DEVICE — BITEBLOCK OMNI BLOC

## (undated) DEVICE — TUBING, SUCTION, 1/4" X 10', STRAIGHT: Brand: MEDLINE